# Patient Record
Sex: MALE | Race: WHITE | NOT HISPANIC OR LATINO | Employment: OTHER | URBAN - METROPOLITAN AREA
[De-identification: names, ages, dates, MRNs, and addresses within clinical notes are randomized per-mention and may not be internally consistent; named-entity substitution may affect disease eponyms.]

---

## 2020-03-08 ENCOUNTER — OFFICE VISIT (OUTPATIENT)
Dept: URGENT CARE | Facility: CLINIC | Age: 80
End: 2020-03-08
Payer: COMMERCIAL

## 2020-03-08 VITALS
TEMPERATURE: 96.5 F | OXYGEN SATURATION: 96 % | DIASTOLIC BLOOD PRESSURE: 86 MMHG | SYSTOLIC BLOOD PRESSURE: 148 MMHG | HEIGHT: 68 IN | WEIGHT: 192 LBS | BODY MASS INDEX: 29.1 KG/M2 | HEART RATE: 60 BPM | RESPIRATION RATE: 16 BRPM

## 2020-03-08 DIAGNOSIS — R13.12 OROPHARYNGEAL DYSPHAGIA: Primary | ICD-10-CM

## 2020-03-08 PROCEDURE — 99204 OFFICE O/P NEW MOD 45 MIN: CPT | Performed by: FAMILY MEDICINE

## 2020-03-08 RX ORDER — GABAPENTIN 300 MG/1
300 CAPSULE ORAL 3 TIMES DAILY
COMMUNITY
Start: 2020-01-24

## 2020-03-08 RX ORDER — LISINOPRIL 10 MG/1
15 TABLET ORAL DAILY
COMMUNITY
Start: 2020-01-22

## 2020-03-08 RX ORDER — ROSUVASTATIN CALCIUM 5 MG/1
5 TABLET, COATED ORAL DAILY
COMMUNITY
Start: 2020-01-21

## 2020-03-08 RX ORDER — MELATONIN
1000 DAILY
COMMUNITY

## 2020-03-08 NOTE — PROGRESS NOTES
Assessment/Plan:  I recommend resting the throes much as possible for now he lightly and follow-up with family physician if not a lot better in 3 days  Diagnoses and all orders for this visit:    Oropharyngeal dysphagia    Other orders  -     gabapentin (NEURONTIN) 300 mg capsule  -     rosuvastatin (CRESTOR) 5 mg tablet  -     lisinopril (ZESTRIL) 10 mg tablet  -     cholecalciferol (VITAMIN D3) 1,000 units tablet; Take 1,000 Units by mouth daily            Subjective:        Patient ID: Efrain Marr is a 78 y o  male  Patient presents with:  Difficulty Swallowing: States a peice of apple got lodge in his throat this morning, drank water and felt it go down, however since incident her feel as if it is difficult to swallow and even liquids "don't go down easy"            The following portions of the patient's history were reviewed and updated as appropriate: allergies, current medications, past family history, past medical history, past social history, past surgical history and problem list       Review of Systems   Constitutional: Negative  HENT: Positive for trouble swallowing  Eyes: Negative  Respiratory: Negative  Cardiovascular: Negative  Gastrointestinal: Negative  Endocrine: Negative  Genitourinary: Negative  Musculoskeletal: Negative  Skin: Negative  Allergic/Immunologic: Negative  Neurological: Negative  Hematological: Negative  Psychiatric/Behavioral: Negative  All other systems reviewed and are negative  Objective:             /86   Pulse 60   Temp (!) 96 5 °F (35 8 °C) (Tympanic)   Resp 16   Ht 5' 8" (1 727 m)   Wt 87 1 kg (192 lb)   SpO2 96%   BMI 29 19 kg/m²          Physical Exam   Constitutional: He is oriented to person, place, and time  He appears well-developed and well-nourished  HENT:   Head: Normocephalic and atraumatic     Right Ear: External ear normal    Left Ear: External ear normal    Nose: Nose normal  Mouth/Throat: Oropharynx is clear and moist    Eyes: Pupils are equal, round, and reactive to light  Conjunctivae and EOM are normal    Neck: Normal range of motion  Neck supple  Cardiovascular: Normal rate, regular rhythm and normal heart sounds  Pulmonary/Chest: Effort normal and breath sounds normal    Abdominal: Soft  Bowel sounds are normal    Musculoskeletal: Normal range of motion  Neurological: He is alert and oriented to person, place, and time  He has normal reflexes  Skin: Skin is warm and dry  Psychiatric: He has a normal mood and affect  His behavior is normal    Nursing note and vitals reviewed

## 2021-03-04 ENCOUNTER — HOSPITAL ENCOUNTER (INPATIENT)
Facility: HOSPITAL | Age: 81
LOS: 4 days | Discharge: HOME | DRG: 872 | End: 2021-03-08
Attending: EMERGENCY MEDICINE | Admitting: FAMILY MEDICINE
Payer: COMMERCIAL

## 2021-03-04 ENCOUNTER — APPOINTMENT (EMERGENCY)
Dept: CT IMAGING | Facility: HOSPITAL | Age: 81
DRG: 872 | End: 2021-03-04
Payer: COMMERCIAL

## 2021-03-04 DIAGNOSIS — J18.9 PNEUMONIA: ICD-10-CM

## 2021-03-04 DIAGNOSIS — K52.9 COLITIS: Primary | ICD-10-CM

## 2021-03-04 DIAGNOSIS — A04.72 C. DIFFICILE COLITIS: ICD-10-CM

## 2021-03-04 DIAGNOSIS — M89.9 LESION OF RIGHT FEMUR: ICD-10-CM

## 2021-03-04 DIAGNOSIS — N17.9 AKI (ACUTE KIDNEY INJURY) (HCC): ICD-10-CM

## 2021-03-04 PROBLEM — R19.7 DIARRHEA OF PRESUMED INFECTIOUS ORIGIN: Status: ACTIVE | Noted: 2021-03-04

## 2021-03-04 PROBLEM — I10 ESSENTIAL HYPERTENSION: Status: ACTIVE | Noted: 2021-03-04

## 2021-03-04 PROBLEM — D69.6 THROMBOCYTOPENIA (HCC): Status: ACTIVE | Noted: 2021-03-04

## 2021-03-04 PROBLEM — G89.29 CHRONIC BACK PAIN: Status: ACTIVE | Noted: 2021-03-04

## 2021-03-04 PROBLEM — R53.1 GENERALIZED WEAKNESS: Status: ACTIVE | Noted: 2021-03-04

## 2021-03-04 PROBLEM — M54.9 CHRONIC BACK PAIN: Status: ACTIVE | Noted: 2021-03-04

## 2021-03-04 PROBLEM — R91.8 OPACITY OF LUNG ON IMAGING STUDY: Status: ACTIVE | Noted: 2021-03-04

## 2021-03-04 PROBLEM — A41.9 SEPSIS WITHOUT ACUTE ORGAN DYSFUNCTION (HCC): Status: ACTIVE | Noted: 2021-03-04

## 2021-03-04 PROBLEM — N18.9 CKD (CHRONIC KIDNEY DISEASE): Status: ACTIVE | Noted: 2021-03-04

## 2021-03-04 PROBLEM — E78.00 HYPERCHOLESTEROLEMIA: Status: ACTIVE | Noted: 2021-03-04

## 2021-03-04 PROBLEM — D72.819 CHRONIC LEUKOPENIA: Status: ACTIVE | Noted: 2021-03-04

## 2021-03-04 PROBLEM — C91.10 CLL (CHRONIC LYMPHOCYTIC LEUKEMIA) (HCC): Status: ACTIVE | Noted: 2021-03-04

## 2021-03-04 LAB
ALBUMIN SERPL BCP-MCNC: 3.7 G/DL (ref 3.5–5)
ALP SERPL-CCNC: 67 U/L (ref 46–116)
ALT SERPL W P-5'-P-CCNC: 23 U/L (ref 12–78)
ANION GAP SERPL CALCULATED.3IONS-SCNC: 9 MMOL/L (ref 4–13)
AST SERPL W P-5'-P-CCNC: 21 U/L (ref 5–45)
BASOPHILS # BLD AUTO: 0.01 THOUSANDS/ΜL (ref 0–0.1)
BASOPHILS NFR BLD AUTO: 0 % (ref 0–1)
BILIRUB SERPL-MCNC: 0.64 MG/DL (ref 0.2–1)
BUN SERPL-MCNC: 27 MG/DL (ref 5–25)
CALCIUM SERPL-MCNC: 8.7 MG/DL (ref 8.3–10.1)
CHLORIDE SERPL-SCNC: 101 MMOL/L (ref 100–108)
CO2 SERPL-SCNC: 26 MMOL/L (ref 21–32)
CREAT SERPL-MCNC: 1.84 MG/DL (ref 0.6–1.3)
EOSINOPHIL # BLD AUTO: 0.09 THOUSAND/ΜL (ref 0–0.61)
EOSINOPHIL NFR BLD AUTO: 2 % (ref 0–6)
ERYTHROCYTE [DISTWIDTH] IN BLOOD BY AUTOMATED COUNT: 13.2 % (ref 11.6–15.1)
FLUAV RNA RESP QL NAA+PROBE: NEGATIVE
FLUBV RNA RESP QL NAA+PROBE: NEGATIVE
GFR SERPL CREATININE-BSD FRML MDRD: 33 ML/MIN/1.73SQ M
GLUCOSE SERPL-MCNC: 109 MG/DL (ref 65–140)
HCT VFR BLD AUTO: 40.4 % (ref 36.5–49.3)
HGB BLD-MCNC: 13.4 G/DL (ref 12–17)
IMM GRANULOCYTES # BLD AUTO: 0.03 THOUSAND/UL (ref 0–0.2)
IMM GRANULOCYTES NFR BLD AUTO: 1 % (ref 0–2)
LACTATE SERPL-SCNC: 0.8 MMOL/L (ref 0.5–2)
LIPASE SERPL-CCNC: 114 U/L (ref 73–393)
LYMPHOCYTES # BLD AUTO: 1.77 THOUSANDS/ΜL (ref 0.6–4.47)
LYMPHOCYTES NFR BLD AUTO: 30 % (ref 14–44)
MCH RBC QN AUTO: 31.8 PG (ref 26.8–34.3)
MCHC RBC AUTO-ENTMCNC: 33.2 G/DL (ref 31.4–37.4)
MCV RBC AUTO: 96 FL (ref 82–98)
MONOCYTES # BLD AUTO: 1.37 THOUSAND/ΜL (ref 0.17–1.22)
MONOCYTES NFR BLD AUTO: 23 % (ref 4–12)
NEUTROPHILS # BLD AUTO: 2.58 THOUSANDS/ΜL (ref 1.85–7.62)
NEUTS SEG NFR BLD AUTO: 44 % (ref 43–75)
NRBC BLD AUTO-RTO: 0 /100 WBCS
PLATELET # BLD AUTO: 63 THOUSANDS/UL (ref 149–390)
PMV BLD AUTO: 11.6 FL (ref 8.9–12.7)
POTASSIUM SERPL-SCNC: 4.5 MMOL/L (ref 3.5–5.3)
PROT SERPL-MCNC: 7.3 G/DL (ref 6.4–8.2)
RBC # BLD AUTO: 4.21 MILLION/UL (ref 3.88–5.62)
RSV RNA RESP QL NAA+PROBE: NEGATIVE
SARS-COV-2 RNA RESP QL NAA+PROBE: NEGATIVE
SODIUM SERPL-SCNC: 136 MMOL/L (ref 136–145)
WBC # BLD AUTO: 5.85 THOUSAND/UL (ref 4.31–10.16)

## 2021-03-04 PROCEDURE — 83690 ASSAY OF LIPASE: CPT | Performed by: EMERGENCY MEDICINE

## 2021-03-04 PROCEDURE — 96374 THER/PROPH/DIAG INJ IV PUSH: CPT

## 2021-03-04 PROCEDURE — 83605 ASSAY OF LACTIC ACID: CPT | Performed by: NURSE PRACTITIONER

## 2021-03-04 PROCEDURE — 84145 PROCALCITONIN (PCT): CPT | Performed by: NURSE PRACTITIONER

## 2021-03-04 PROCEDURE — 96375 TX/PRO/DX INJ NEW DRUG ADDON: CPT

## 2021-03-04 PROCEDURE — 99285 EMERGENCY DEPT VISIT HI MDM: CPT

## 2021-03-04 PROCEDURE — G1004 CDSM NDSC: HCPCS

## 2021-03-04 PROCEDURE — 96361 HYDRATE IV INFUSION ADD-ON: CPT

## 2021-03-04 PROCEDURE — 74176 CT ABD & PELVIS W/O CONTRAST: CPT

## 2021-03-04 PROCEDURE — 36415 COLL VENOUS BLD VENIPUNCTURE: CPT | Performed by: EMERGENCY MEDICINE

## 2021-03-04 PROCEDURE — 99223 1ST HOSP IP/OBS HIGH 75: CPT | Performed by: NURSE PRACTITIONER

## 2021-03-04 PROCEDURE — 0241U HB NFCT DS VIR RESP RNA 4 TRGT: CPT | Performed by: EMERGENCY MEDICINE

## 2021-03-04 PROCEDURE — 80053 COMPREHEN METABOLIC PANEL: CPT | Performed by: EMERGENCY MEDICINE

## 2021-03-04 PROCEDURE — 85025 COMPLETE CBC W/AUTO DIFF WBC: CPT | Performed by: EMERGENCY MEDICINE

## 2021-03-04 PROCEDURE — 87040 BLOOD CULTURE FOR BACTERIA: CPT | Performed by: EMERGENCY MEDICINE

## 2021-03-04 PROCEDURE — 99285 EMERGENCY DEPT VISIT HI MDM: CPT | Performed by: EMERGENCY MEDICINE

## 2021-03-04 RX ORDER — SODIUM CHLORIDE 9 MG/ML
100 INJECTION, SOLUTION INTRAVENOUS CONTINUOUS
Status: DISPENSED | OUTPATIENT
Start: 2021-03-04 | End: 2021-03-05

## 2021-03-04 RX ORDER — ONDANSETRON 2 MG/ML
4 INJECTION INTRAMUSCULAR; INTRAVENOUS ONCE
Status: COMPLETED | OUTPATIENT
Start: 2021-03-04 | End: 2021-03-04

## 2021-03-04 RX ORDER — SODIUM CHLORIDE 9 MG/ML
150 INJECTION, SOLUTION INTRAVENOUS CONTINUOUS
Status: DISCONTINUED | OUTPATIENT
Start: 2021-03-04 | End: 2021-03-04

## 2021-03-04 RX ORDER — HEPARIN SODIUM 5000 [USP'U]/ML
5000 INJECTION, SOLUTION INTRAVENOUS; SUBCUTANEOUS EVERY 8 HOURS SCHEDULED
Status: DISCONTINUED | OUTPATIENT
Start: 2021-03-04 | End: 2021-03-04

## 2021-03-04 RX ORDER — GABAPENTIN 100 MG/1
100 CAPSULE ORAL
Status: DISCONTINUED | OUTPATIENT
Start: 2021-03-04 | End: 2021-03-08 | Stop reason: HOSPADM

## 2021-03-04 RX ORDER — ONDANSETRON 2 MG/ML
4 INJECTION INTRAMUSCULAR; INTRAVENOUS EVERY 6 HOURS PRN
Status: DISCONTINUED | OUTPATIENT
Start: 2021-03-04 | End: 2021-03-08 | Stop reason: HOSPADM

## 2021-03-04 RX ORDER — LEVOFLOXACIN 5 MG/ML
750 INJECTION, SOLUTION INTRAVENOUS ONCE
Status: DISCONTINUED | OUTPATIENT
Start: 2021-03-05 | End: 2021-03-04

## 2021-03-04 RX ORDER — LISINOPRIL 10 MG/1
10 TABLET ORAL DAILY
Status: DISCONTINUED | OUTPATIENT
Start: 2021-03-05 | End: 2021-03-08 | Stop reason: HOSPADM

## 2021-03-04 RX ORDER — LEVOFLOXACIN 5 MG/ML
500 INJECTION, SOLUTION INTRAVENOUS
Status: DISCONTINUED | OUTPATIENT
Start: 2021-03-06 | End: 2021-03-05

## 2021-03-04 RX ORDER — ACETAMINOPHEN 325 MG/1
650 TABLET ORAL ONCE
Status: COMPLETED | OUTPATIENT
Start: 2021-03-04 | End: 2021-03-04

## 2021-03-04 RX ORDER — LEVOFLOXACIN 5 MG/ML
750 INJECTION, SOLUTION INTRAVENOUS ONCE
Status: COMPLETED | OUTPATIENT
Start: 2021-03-04 | End: 2021-03-04

## 2021-03-04 RX ADMIN — LEVOFLOXACIN 750 MG: 5 INJECTION, SOLUTION INTRAVENOUS at 21:06

## 2021-03-04 RX ADMIN — HEPARIN SODIUM 5000 UNITS: 5000 INJECTION INTRAVENOUS; SUBCUTANEOUS at 23:37

## 2021-03-04 RX ADMIN — SODIUM CHLORIDE 150 ML/HR: 0.9 INJECTION, SOLUTION INTRAVENOUS at 18:40

## 2021-03-04 RX ADMIN — ACETAMINOPHEN 650 MG: 325 TABLET ORAL at 17:33

## 2021-03-04 RX ADMIN — ONDANSETRON 4 MG: 2 INJECTION INTRAMUSCULAR; INTRAVENOUS at 17:25

## 2021-03-04 RX ADMIN — METRONIDAZOLE 500 MG: 500 INJECTION, SOLUTION INTRAVENOUS at 20:29

## 2021-03-04 RX ADMIN — SODIUM CHLORIDE 100 ML/HR: 0.9 INJECTION, SOLUTION INTRAVENOUS at 23:35

## 2021-03-04 RX ADMIN — GABAPENTIN 100 MG: 100 CAPSULE ORAL at 23:37

## 2021-03-04 RX ADMIN — SODIUM CHLORIDE 1000 ML: 0.9 INJECTION, SOLUTION INTRAVENOUS at 17:24

## 2021-03-04 NOTE — ED NOTES
Patient taken to bathroom via wheelchair and assist of one nurse       Sangita Vaz RN  03/04/21 1393

## 2021-03-04 NOTE — ED NOTES
Patient transported to [address] [place] [address], [address] [place] [address]  03/04/21 1247

## 2021-03-04 NOTE — ED PROVIDER NOTES
History  Chief Complaint   Patient presents with    Diarrhea     pt states he has been having diarrhea for the past week  c/o increase weakness and had an episode today of walking down steps and legs felt too weak so he collapsed on his butt  denies hitting head, no loc  recently tested for covid and was negative     Patient is an 15-year-old male brought to the emergency department by EMS secondary to diarrhea x1 week with generalized weakness, patient reports some nausea at times but no vomiting, no fever or chills, he does report crampy abdominal pain but cannot pinpoint any specific area, he has noted small amounts of bright red blood in his stool at times which he contributes to his history of hemorrhoids, he denies any sick contacts, today he was walking up some stairs to go to the bathroom in his legs became very weak prompting him to sit down at which time his wife called EMS for assistance, he did not have any loss of consciousness and denies any injury          Prior to Admission Medications   Prescriptions Last Dose Informant Patient Reported? Taking? cholecalciferol (VITAMIN D3) 1,000 units tablet 3/4/2021 at Unknown time  Yes Yes   Sig: Take 1,000 Units by mouth daily   gabapentin (NEURONTIN) 300 mg capsule 3/4/2021 at Unknown time  Yes Yes   lisinopril (ZESTRIL) 10 mg tablet 3/4/2021 at Unknown time  Yes Yes   rosuvastatin (CRESTOR) 5 mg tablet 3/4/2021 at Unknown time  Yes Yes      Facility-Administered Medications: None       Past Medical History:   Diagnosis Date    Hypertension     Prostate cancer [facility])        Past Surgical History:   Procedure Laterality Date    APPENDECTOMY      BACK SURGERY  2016    CATARACT EXTRACTION  2020    PROSTATE SURGERY         History reviewed  No pertinent family history  I have reviewed and agree with the history as documented      E-Cigarette/Vaping    E-Cigarette Use Never User      E-Cigarette/Vaping Substances     Social History     Tobacco Use    Smoking status: Former Smoker    Smokeless tobacco: Never Used    Tobacco comment: Quit at age 23   Substance Use Topics    Alcohol use: Not Currently    Drug use: Not Currently       Review of Systems   Constitutional: Positive for fatigue  HENT: Negative  Eyes: Negative  Respiratory: Negative  Cardiovascular: Negative  Gastrointestinal: Positive for diarrhea and nausea  Endocrine: Negative  Genitourinary: Negative  Musculoskeletal: Negative  Skin: Negative  Allergic/Immunologic: Negative  Neurological: Positive for weakness  Hematological: Negative  Psychiatric/Behavioral: Negative  Physical Exam  Physical Exam  Constitutional:       Appearance: He is well-developed  He is ill-appearing  HENT:      Head: Normocephalic and atraumatic  Eyes:      Conjunctiva/sclera: Conjunctivae normal       Pupils: Pupils are equal, round, and reactive to light  Neck:      Musculoskeletal: Normal range of motion and neck supple  Cardiovascular:      Rate and Rhythm: Normal rate  Pulmonary:      Effort: Pulmonary effort is normal    Abdominal:      Palpations: Abdomen is soft  Musculoskeletal: Normal range of motion  Skin:     General: Skin is warm and dry  Neurological:      Mental Status: He is alert and oriented to person, place, and time           Vital Signs  ED Triage Vitals   Temperature Pulse Respirations Blood Pressure SpO2   03/04/21 1718 03/04/21 1718 03/04/21 1718 03/04/21 1718 03/04/21 1718   (!) 101 7 °F (38 7 °C) 99 20 153/85 93 %      Temp Source Heart Rate Source Patient Position - Orthostatic VS BP Location FiO2 (%)   03/04/21 1718 03/04/21 1718 03/04/21 2015 03/04/21 1718 --   Temporal Monitor Sitting Left arm       Pain Score       03/04/21 1733       Med Not Given for Pain - for MAR use only           Vitals:    03/04/21 1718 03/04/21 1915 03/04/21 2015 03/04/21 2055   BP: 153/85 145/66 126/58 139/78   Pulse: 99 102 101 100   Patient Position - Orthostatic VS:   Sitting Sitting             ED Medications  Medications   sodium chloride 0 9 % infusion (150 mL/hr Intravenous New Bag 3/4/21 1840)   metroNIDAZOLE (FLAGYL) IVPB (premix) 500 mg 100 mL (500 mg Intravenous New Bag 3/4/21 2029)   levofloxacin (LEVAQUIN) IVPB (premix in dextrose) 750 mg 150 mL (has no administration in time range)   sodium chloride 0 9 % bolus 1,000 mL (0 mL Intravenous Stopped 3/4/21 1833)   ondansetron (ZOFRAN) injection 4 mg (4 mg Intravenous Given 3/4/21 1725)   acetaminophen (TYLENOL) tablet 650 mg (650 mg Oral Given 3/4/21 1733)       Diagnostic Studies  Results Reviewed     Procedure Component Value Units Date/Time    Lactic acid, plasma [[id number]]     Lab Status: No result Specimen: Blood     COVID19, Influenza A/B, RSV PCR, [facility] [[id number]]  (Normal) Collected: 03/04/21 1723    Lab Status: Final result Specimen: Nares from Nasopharyngeal Swab Updated: 03/04/21 1818     SARS-CoV-2 Negative     INFLUENZA A PCR Negative     INFLUENZA B PCR Negative     RSV PCR Negative    Narrative: This test has been authorized by FDA under an EUA (Emergency Use Assay) for use by authorized laboratories  Clinical caution and judgement should be used with the interpretation of these results with consideration of the clinical impression and other laboratory testing  Testing reported as "Positive" or "Negative" has been proven to be accurate according to standard laboratory validation requirements  All testing is performed with control materials showing appropriate reactivity at standard intervals      CBC and differential [[id number]]  (Abnormal) Collected: 03/04/21 1724    Lab Status: Final result Specimen: Blood from Arm, Left Updated: 03/04/21 1759     WBC 5 85 Thousand/uL      RBC 4 21 Million/uL      Hemoglobin 13 4 g/dL      Hematocrit 40 4 %      MCV 96 fL      MCH 31 8 pg      MCHC 33 2 g/dL      RDW 13 2 %      MPV 11 6 fL      Platelets 63 Thousands/uL      nRBC 0 /100 WBCs Neutrophils Relative 44 %      Immat GRANS % 1 %      Lymphocytes Relative 30 %      Monocytes Relative 23 %      Eosinophils Relative 2 %      Basophils Relative 0 %      Neutrophils Absolute 2 58 Thousands/µL      Immature Grans Absolute 0 03 Thousand/uL      Lymphocytes Absolute 1 77 Thousands/µL      Monocytes Absolute 1 37 Thousand/µL      Eosinophils Absolute 0 09 Thousand/µL      Basophils Absolute 0 01 Thousands/µL     Comprehensive metabolic panel [[id number]]  (Abnormal) Collected: 03/04/21 1724    Lab Status: Final result Specimen: Blood from Arm, Left Updated: 03/04/21 1751     Sodium 136 mmol/L      Potassium 4 5 mmol/L      Chloride 101 mmol/L      CO2 26 mmol/L      ANION GAP 9 mmol/L      BUN 27 mg/dL      Creatinine 1 84 mg/dL      Glucose 109 mg/dL      Calcium 8 7 mg/dL      AST 21 U/L      ALT 23 U/L      Alkaline Phosphatase 67 U/L      Total Protein 7 3 g/dL      Albumin 3 7 g/dL      Total Bilirubin 0 64 mg/dL      eGFR 33 ml/min/1 73sq m     Narrative:      [place] guidelines for Chronic Kidney Disease (CKD):     Stage 1 with normal or high GFR (GFR > 90 mL/min/1 73 square meters)    Stage 2 Mild CKD (GFR = 60-89 mL/min/1 73 square meters)    Stage 3A Moderate CKD (GFR = 45-59 mL/min/1 73 square meters)    Stage 3B Moderate CKD (GFR = 30-44 mL/min/1 73 square meters)    Stage 4 Severe CKD (GFR = 15-29 mL/min/1 73 square meters)    Stage 5 End Stage CKD (GFR <15 mL/min/1 73 square meters)  Note: GFR calculation is accurate only with a steady state creatinine    Lipase [[id number]]  (Normal) Collected: 03/04/21 1724    Lab Status: Final result Specimen: Blood from Arm, Left Updated: 03/04/21 1751     Lipase 114 u/L     Blood culture #2 [[id number]] Collected: 03/04/21 1724    Lab Status: In process Specimen: Blood from Arm, Left Updated: 03/04/21 1733    Blood culture #1 [[id number]] Collected: 03/04/21 1730    Lab Status:  In process Specimen: Blood from Hand, Right Updated: 03/04/21 1733    UA w Reflex to Microscopic w Reflex to Culture [[id number]]     Lab Status: No result Specimen: Urine                  CT abdomen pelvis wo contrast   Final Result by Julian Henry MD (03/04 1955)      1  Colitis involving the descending and sigmoid colon   2  Bibasal opacities with airspace opacities in the right lower lobe, concerning for pneumonia  Recommend follow-up chest CT in 2 months to ensure resolution   3  Osseous lesion in the right femur,  imaging characteristics suggest a low-grade chondroid lesion, however metastasis is also a differential consideration  If prior CT or MRI  become available, this can be up loaded, and an addendum to this report can    be issued upon request   Otherwise suggest follow-up bone scan for further evaluation      The study was marked in [facility] for immediate notification              Workstation performed: [id number]                           ED Course  ED Course as of Mar 04 2058   Thu Mar 04, 2021   2058 Patient with stable vital signs, evidence of KATELIN with creatinine of 1 84, CT scan consistent with colitis as well as pneumonia, started on IV antibiotics and discussed with hospitalist for admission, plan was discussed with patient at bedside who is in agreement as well                                                Disposition  Final diagnoses:   Colitis   Pneumonia   KATELIN (acute kidney injury) ([facility] [address] )     Time reflects when diagnosis was documented in both MDM as applicable and the Disposition within this note     Time User Action Codes Description Comment    3/4/2021  8:39 PM Scott Jean Add [K52 9] Colitis     3/4/2021  8:39 PM Von Langford Add [J18 9] Pneumonia     3/4/2021  8:39 PM Von Langford Add [N17 9] KATELIN (acute kidney injury) [facility])       ED Disposition     ED Disposition Condition Date/Time Comment    Admit Stable Thu Mar 4, 2021  8:39 PM Case was discussed with STEPHANIE Pedroza and the patient's admission status was agreed to be Admission Status: inpatient status to the service of Dr Gilma Palma          Follow-up Information    None         Patient's Medications   Discharge Prescriptions    No medications on file     No discharge procedures on file      PDMP Review     None          ED Provider  Electronically Signed by           Lauri Ferrer DO  03/04/21 8722

## 2021-03-05 ENCOUNTER — APPOINTMENT (INPATIENT)
Dept: CT IMAGING | Facility: HOSPITAL | Age: 81
DRG: 872 | End: 2021-03-05
Payer: COMMERCIAL

## 2021-03-05 LAB
ANION GAP SERPL CALCULATED.3IONS-SCNC: 8 MMOL/L (ref 4–13)
BACTERIA UR QL AUTO: NORMAL /HPF
BASOPHILS # BLD AUTO: 0.01 THOUSANDS/ΜL (ref 0–0.1)
BASOPHILS NFR BLD AUTO: 0 % (ref 0–1)
BILIRUB UR QL STRIP: NEGATIVE
BUN SERPL-MCNC: 24 MG/DL (ref 5–25)
CALCIUM SERPL-MCNC: 8.1 MG/DL (ref 8.3–10.1)
CAMPYLOBACTER DNA SPEC NAA+PROBE: NORMAL
CHLORIDE SERPL-SCNC: 104 MMOL/L (ref 100–108)
CLARITY UR: CLEAR
CO2 SERPL-SCNC: 26 MMOL/L (ref 21–32)
COLOR UR: YELLOW
CREAT SERPL-MCNC: 1.7 MG/DL (ref 0.6–1.3)
EOSINOPHIL # BLD AUTO: 0 THOUSAND/ΜL (ref 0–0.61)
EOSINOPHIL NFR BLD AUTO: 0 % (ref 0–6)
ERYTHROCYTE [DISTWIDTH] IN BLOOD BY AUTOMATED COUNT: 13.4 % (ref 11.6–15.1)
GFR SERPL CREATININE-BSD FRML MDRD: 36 ML/MIN/1.73SQ M
GLUCOSE SERPL-MCNC: 108 MG/DL (ref 65–140)
GLUCOSE UR STRIP-MCNC: NEGATIVE MG/DL
HCT VFR BLD AUTO: 37.2 % (ref 36.5–49.3)
HEMOCCULT STL QL: POSITIVE
HGB BLD-MCNC: 12.2 G/DL (ref 12–17)
HGB UR QL STRIP.AUTO: ABNORMAL
IMM GRANULOCYTES # BLD AUTO: 0.04 THOUSAND/UL (ref 0–0.2)
IMM GRANULOCYTES NFR BLD AUTO: 1 % (ref 0–2)
KETONES UR STRIP-MCNC: ABNORMAL MG/DL
LEUKOCYTE ESTERASE UR QL STRIP: NEGATIVE
LYMPHOCYTES # BLD AUTO: 1.51 THOUSANDS/ΜL (ref 0.6–4.47)
LYMPHOCYTES NFR BLD AUTO: 21 % (ref 14–44)
MCH RBC QN AUTO: 31.5 PG (ref 26.8–34.3)
MCHC RBC AUTO-ENTMCNC: 32.8 G/DL (ref 31.4–37.4)
MCV RBC AUTO: 96 FL (ref 82–98)
MONOCYTES # BLD AUTO: 1.96 THOUSAND/ΜL (ref 0.17–1.22)
MONOCYTES NFR BLD AUTO: 27 % (ref 4–12)
NEUTROPHILS # BLD AUTO: 3.66 THOUSANDS/ΜL (ref 1.85–7.62)
NEUTS SEG NFR BLD AUTO: 51 % (ref 43–75)
NITRITE UR QL STRIP: NEGATIVE
NON-SQ EPI CELLS URNS QL MICRO: NORMAL /HPF
NRBC BLD AUTO-RTO: 0 /100 WBCS
PH UR STRIP.AUTO: 5 [PH]
PLATELET # BLD AUTO: 56 THOUSANDS/UL (ref 149–390)
PMV BLD AUTO: 11.8 FL (ref 8.9–12.7)
POTASSIUM SERPL-SCNC: 4.2 MMOL/L (ref 3.5–5.3)
PROCALCITONIN SERPL-MCNC: 0.39 NG/ML
PROT UR STRIP-MCNC: NEGATIVE MG/DL
RBC # BLD AUTO: 3.87 MILLION/UL (ref 3.88–5.62)
RBC #/AREA URNS AUTO: NORMAL /HPF
SALMONELLA DNA SPEC QL NAA+PROBE: NORMAL
SHIGA TOXIN STX GENE SPEC NAA+PROBE: NORMAL
SHIGELLA DNA SPEC QL NAA+PROBE: NORMAL
SODIUM SERPL-SCNC: 138 MMOL/L (ref 136–145)
SP GR UR STRIP.AUTO: 1.02 (ref 1–1.03)
UROBILINOGEN UR QL STRIP.AUTO: 0.2 E.U./DL
WBC # BLD AUTO: 7.18 THOUSAND/UL (ref 4.31–10.16)
WBC #/AREA URNS AUTO: NORMAL /HPF

## 2021-03-05 PROCEDURE — 87505 NFCT AGENT DETECTION GI: CPT | Performed by: NURSE PRACTITIONER

## 2021-03-05 PROCEDURE — 87493 C DIFF AMPLIFIED PROBE: CPT | Performed by: FAMILY MEDICINE

## 2021-03-05 PROCEDURE — 80048 BASIC METABOLIC PNL TOTAL CA: CPT | Performed by: NURSE PRACTITIONER

## 2021-03-05 PROCEDURE — 87505 NFCT AGENT DETECTION GI: CPT | Performed by: FAMILY MEDICINE

## 2021-03-05 PROCEDURE — 99233 SBSQ HOSP IP/OBS HIGH 50: CPT | Performed by: FAMILY MEDICINE

## 2021-03-05 PROCEDURE — 73700 CT LOWER EXTREMITY W/O DYE: CPT

## 2021-03-05 PROCEDURE — 85025 COMPLETE CBC W/AUTO DIFF WBC: CPT | Performed by: NURSE PRACTITIONER

## 2021-03-05 PROCEDURE — 82272 OCCULT BLD FECES 1-3 TESTS: CPT | Performed by: NURSE PRACTITIONER

## 2021-03-05 PROCEDURE — 81001 URINALYSIS AUTO W/SCOPE: CPT | Performed by: EMERGENCY MEDICINE

## 2021-03-05 RX ADMIN — GABAPENTIN 100 MG: 100 CAPSULE ORAL at 21:20

## 2021-03-05 RX ADMIN — METRONIDAZOLE 500 MG: 500 INJECTION, SOLUTION INTRAVENOUS at 04:39

## 2021-03-05 RX ADMIN — LISINOPRIL 10 MG: 10 TABLET ORAL at 08:24

## 2021-03-05 NOTE — ASSESSMENT & PLAN NOTE
· Diarrhea x1 week with fever 101 7  · CT abdomen pelvis:  Colitis involving the descending and sigmoid colon  · NPO after midnight  · GI consult  · Check stool panel  · Hemoccult stool  · Stool record at bedside  · Continue Flagyl and Levaquin-renally dosed

## 2021-03-05 NOTE — ASSESSMENT & PLAN NOTE
· Last lipid panel in March of 2019 unremarkable  · Continue Crestor  · Continue outpatient follow-up

## 2021-03-05 NOTE — ASSESSMENT & PLAN NOTE
· Osseous lesion in the right femur, imaging characteristics suggest a low-grade chondroid lesion, however metastasis is also differential consideration  · If prior or CT or MRI becomes available, these can be uploaded, and an addendum this report can be issued upon request   Unable to find these imaging studies in available records  · Follow-up bone scan for further evaluation

## 2021-03-05 NOTE — ASSESSMENT & PLAN NOTE
Lab Results   Component Value Date    EGFR 36 03/05/2021    EGFR 33 03/04/2021    CREATININE 1 70 (H) 03/05/2021    CREATININE 1 84 (H) 03/04/2021     · Baseline 1 5  · Daily metabolic panel and trend creatinine  · Caution with nephrotoxins avoid hypotension

## 2021-03-05 NOTE — PLAN OF CARE
Problem: Potential for Falls  Goal: Patient will remain free of falls  Description: INTERVENTIONS:  - Assess patient frequently for physical needs  -  Identify cognitive and physical deficits and behaviors that affect risk of falls  -  [place] fall precautions as indicated by assessment   - Educate patient/family on patient safety including physical limitations  - Instruct patient to call for assistance with activity based on assessment  - Modify environment to reduce risk of injury  - Consider OT/PT consult to assist with strengthening/mobility  3/5/2021 0025 by Trish Joseph RN  Outcome: Progressing  3/5/2021 0025 by Trish Joseph RN  Outcome: Progressing     Problem: Nutrition/Hydration-ADULT  Goal: Nutrient/Hydration intake appropriate for improving, restoring or maintaining nutritional needs  Description: Monitor and assess patient's nutrition/hydration status for malnutrition  Collaborate with interdisciplinary team and initiate plan and interventions as ordered  Monitor patient's weight and dietary intake as ordered or per policy  Utilize nutrition screening tool and intervene as necessary  Determine patient's food preferences and provide high-protein, high-caloric foods as appropriate       INTERVENTIONS:  - Monitor oral intake, urinary output, labs, and treatment plans  - Assess nutrition and hydration status and recommend course of action  - Evaluate amount of meals eaten  - Assist patient with eating if necessary   - Allow adequate time for meals  - Recommend/ encourage appropriate diets, oral nutritional supplements, and vitamin/mineral supplements  - Order, calculate, and assess calorie counts as needed  - Recommend, monitor, and adjust tube feedings and TPN/PPN based on assessed needs  - Assess need for intravenous fluids  - Provide specific nutrition/hydration education as appropriate  - Include patient/family/caregiver in decisions related to nutrition  3/5/2021 0025 by Trish Joseph RN  Outcome: Progressing  3/5/2021 0025 by Chitra De Guzman RN  Outcome: Progressing     Problem: PAIN - ADULT  Goal: Verbalizes/displays adequate comfort level or baseline comfort level  Description: Interventions:  - Encourage patient to monitor pain and request assistance  - Assess pain using appropriate pain scale  - Administer analgesics based on type and severity of pain and evaluate response  - Implement non-pharmacological measures as appropriate and evaluate response  - Consider cultural and social influences on pain and pain management  - Notify physician/advanced practitioner if interventions unsuccessful or patient reports new pain  3/5/2021 0025 by Chitra De Guzman RN  Outcome: Progressing  3/5/2021 0025 by hCitra De Guzman RN  Outcome: Progressing     Problem: INFECTION - ADULT  Goal: Absence or prevention of progression during hospitalization  Description: INTERVENTIONS:  - Assess and monitor for signs and symptoms of infection  - Monitor lab/diagnostic results  - Monitor all insertion sites, i e  indwelling lines, tubes, and drains  - Monitor endotracheal if appropriate and nasal secretions for changes in amount and color  - [place] appropriate cooling/warming therapies per order  - Administer medications as ordered  - Instruct and encourage patient and family to use good hand hygiene technique  - Identify and instruct in appropriate isolation precautions for identified infection/condition  3/5/2021 0025 by Chitra De Guzman RN  Outcome: Progressing  3/5/2021 0025 by Chitra De Guzman RN  Outcome: Progressing  Goal: Absence of fever/infection during neutropenic period  Description: INTERVENTIONS:  - Monitor WBC    3/5/2021 0025 by Chitra De Guzman RN  Outcome: Progressing  3/5/2021 0025 by Chitra De Guzman RN  Outcome: Progressing     Problem: SAFETY ADULT  Goal: Patient will remain free of falls  Description: INTERVENTIONS:  - Assess patient frequently for physical needs  -  Identify cognitive and physical deficits and behaviors that affect risk of falls    -  [place] fall precautions as indicated by assessment   - Educate patient/family on patient safety including physical limitations  - Instruct patient to call for assistance with activity based on assessment  - Modify environment to reduce risk of injury  - Consider OT/PT consult to assist with strengthening/mobility  3/5/2021 0025 by Matt Mejía RN  Outcome: Progressing  3/5/2021 0025 by Matt Mejía RN  Outcome: Progressing  Goal: Maintain or return to baseline ADL function  Description: INTERVENTIONS:  -  Assess patient's ability to carry out ADLs; assess patient's baseline for ADL function and identify physical deficits which impact ability to perform ADLs (bathing, care of mouth/teeth, toileting, grooming, dressing, etc )  - Assess/evaluate cause of self-care deficits   - Assess range of motion  - Assess patient's mobility; develop plan if impaired  - Assess patient's need for assistive devices and provide as appropriate  - Encourage maximum independence but intervene and supervise when necessary  - Involve family in performance of ADLs  - Assess for home care needs following discharge   - Consider OT consult to assist with ADL evaluation and planning for discharge  - Provide patient education as appropriate  3/5/2021 0025 by Matt Mejía RN  Outcome: Progressing  3/5/2021 0025 by Matt Mejía RN  Outcome: Progressing  Goal: Maintain or return mobility status to optimal level  Description: INTERVENTIONS:  - Assess patient's baseline mobility status (ambulation, transfers, stairs, etc )    - Identify cognitive and physical deficits and behaviors that affect mobility  - Identify mobility aids required to assist with transfers and/or ambulation (gait belt, sit-to-stand, lift, walker, cane, etc )  - [place] fall precautions as indicated by assessment  - Record patient progress and toleration of activity level on Mobility SBAR; progress patient to next Phase/Stage  - Instruct patient to call for assistance with activity based on assessment  - Consider rehabilitation consult to assist with strengthening/weightbearing, etc   3/5/2021 0025 by Gilmar Sarkar RN  Outcome: Progressing  3/5/2021 0025 by Gilmar Sarkar RN  Outcome: Progressing     Problem: DISCHARGE PLANNING  Goal: Discharge to home or other facility with appropriate resources  Description: INTERVENTIONS:  - Identify barriers to discharge w/patient and caregiver  - Arrange for needed discharge resources and transportation as appropriate  - Identify discharge learning needs (meds, wound care, etc )  - Arrange for interpretive services to assist at discharge as needed  - Refer to Case Management Department for coordinating discharge planning if the patient needs post-hospital services based on physician/advanced practitioner order or complex needs related to functional status, cognitive ability, or social support system  3/5/2021 0025 by Gilmar Sarkar RN  Outcome: Progressing  3/5/2021 0025 by Gilmar Sarkar RN  Outcome: Progressing     Problem: Knowledge Deficit  Goal: Patient/family/caregiver demonstrates understanding of disease process, treatment plan, medications, and discharge instructions  Description: Complete learning assessment and assess knowledge base    Interventions:  - Provide teaching at level of understanding  - Provide teaching via preferred learning methods  3/5/2021 0025 by Gilmar Sarkar RN  Outcome: Progressing  3/5/2021 0025 by Gilmar Sarkar RN  Outcome: Progressing

## 2021-03-05 NOTE — CONSULTS
Consultation - GI   Mely Loving 78 y o  male MRN: [id number]  Unit/Bed#: -01 Encounter: [id number]      Assessment/Plan     Assessment:    77 y/o man with bloody diarrhea x 4 days leading to weakness and dehydration  CT showing rectal-sparing left sided colitis  Stool studies pending  Suspect resolving ischemic colitis    Plan:    1  Rule out C diff - this should have been done prior to starting antibiotics as metronidazole may result in false negative C diff even though it is no longer recommended for treatment of C diff  2   Would hold antibiotics for now until stool studies are completed    3  Continue conservative management - recommended NPO for another 24 hrs but patient insisting on clears  Given overall stability we can cautiously start clears and observe for worsening pain, fever, hemodynamic instability  4   Colonoscopy as outpt (he never had one) in 1-2 months    5  He was advised by his PCP to start baby ASA - would do that upon discharge      History of Present Illness   Physician Requesting Consult: Cori Mcneal MD  Reason for Consult / Principal Problem:  diarrhea  Hx and PE limited by:   HPI: Mely Loving is a 78y o  year old male who presents with 4 day history of bloody diarrhea  Reports 3-4 loose watery and bloody BM since 3/1/21  Initially not a/w abd pain  Denies any fevers or chills during the last 4 days  Admitted to the hospital as he became weak and fell  No LOC  CT in the ER c/w left sided colitis involving the sigmoid and descending colon  Had fever of 101 7 in the ER and was started on levoquin and metronidazole  Stool was sent for GI pathogens and it is pending  He c/o hunger now and reports 3 episodes of bloody diarrhea overnight  No recent abx use  No recent travel or exposure to someone with diarrheal illness  Lives with wife at home and has not traveled anywhere recently    Denies any consumption of unusual foods to include uncooked meat   No chronic diarrheal illness - no history of IBD  No colonoscopy to date  Physical exam c/w slight tachycardia in the 100s, normoactive bowel sounds   + LLQ pain but no rebound tenderness    Consults    Review of Systems    Historical Information   Past Medical History:   Diagnosis Date    Hypertension     Prostate cancer ([address] )      Past Surgical History:   Procedure Laterality Date    APPENDECTOMY      BACK SURGERY  2016    CATARACT EXTRACTION  2020    PROSTATE SURGERY       Social History   Social History     Substance and Sexual Activity   Alcohol Use Not Currently     Social History     Substance and Sexual Activity   Drug Use Not Currently     E-Cigarette/Vaping    E-Cigarette Use Never User      E-Cigarette/Vaping Substances     Social History     Tobacco Use   Smoking Status Former Smoker   Smokeless Tobacco Never Used   Tobacco Comment    Quit at age 23     Family History: non-contributory    Meds/Allergies   all current active meds have been reviewed    No Known Allergies    Objective       Intake/Output Summary (Last 24 hours) at 3/5/2021 0945  Last data filed at 3/4/2021 2104  Gross per 24 hour   Intake 1100 ml   Output --   Net 1100 ml       Invasive Devices:   Peripheral IV 03/04/21 Left;Ventral (anterior) Arm (Active)   Site Assessment WDL 03/05/21 0800   Dressing Type Transparent 03/05/21 0800   Line Status Flushed;Blood return noted; Infusing 03/05/21 0800   Dressing Status Clean;Dry; Intact 03/05/21 0800   Dressing Change Due 03/08/21 03/05/21 0800   Reason Not Rotated Not due 03/05/21 0800       Physical Exam    Lab Results: I have personally reviewed pertinent reports  Imaging Studies: I have personally reviewed pertinent reports  EKG, Pathology, and Other Studies: I have personally reviewed pertinent reports  VTE Prophylaxis:     Counseling / Coordination of Care  Total floor / unit time spent today 45 minutes   Greater than 50% of total time was spent with the patient and / or family counseling and / or coordination of care   A description of the counseling / coordination of care:

## 2021-03-05 NOTE — PLAN OF CARE
Problem: Potential for Falls  Goal: Patient will remain free of falls  Description: INTERVENTIONS:  - Assess patient frequently for physical needs  -  Identify cognitive and physical deficits and behaviors that affect risk of falls  -  [place] fall precautions as indicated by assessment   - Educate patient/family on patient safety including physical limitations  - Instruct patient to call for assistance with activity based on assessment  - Modify environment to reduce risk of injury  - Consider OT/PT consult to assist with strengthening/mobility  Outcome: Progressing     Problem: Nutrition/Hydration-ADULT  Goal: Nutrient/Hydration intake appropriate for improving, restoring or maintaining nutritional needs  Description: Monitor and assess patient's nutrition/hydration status for malnutrition  Collaborate with interdisciplinary team and initiate plan and interventions as ordered  Monitor patient's weight and dietary intake as ordered or per policy  Utilize nutrition screening tool and intervene as necessary  Determine patient's food preferences and provide high-protein, high-caloric foods as appropriate       INTERVENTIONS:  - Monitor oral intake, urinary output, labs, and treatment plans  - Assess nutrition and hydration status and recommend course of action  - Evaluate amount of meals eaten  - Assist patient with eating if necessary   - Allow adequate time for meals  - Recommend/ encourage appropriate diets, oral nutritional supplements, and vitamin/mineral supplements  - Order, calculate, and assess calorie counts as needed  - Recommend, monitor, and adjust tube feedings and TPN/PPN based on assessed needs  - Assess need for intravenous fluids  - Provide specific nutrition/hydration education as appropriate  - Include patient/family/caregiver in decisions related to nutrition  Outcome: Progressing     Problem: PAIN - ADULT  Goal: Verbalizes/displays adequate comfort level or baseline comfort level  Description: Interventions:  - Encourage patient to monitor pain and request assistance  - Assess pain using appropriate pain scale  - Administer analgesics based on type and severity of pain and evaluate response  - Implement non-pharmacological measures as appropriate and evaluate response  - Consider cultural and social influences on pain and pain management  - Notify physician/advanced practitioner if interventions unsuccessful or patient reports new pain  Outcome: Progressing     Problem: INFECTION - ADULT  Goal: Absence or prevention of progression during hospitalization  Description: INTERVENTIONS:  - Assess and monitor for signs and symptoms of infection  - Monitor lab/diagnostic results  - Monitor all insertion sites, i e  indwelling lines, tubes, and drains  - Monitor endotracheal if appropriate and nasal secretions for changes in amount and color  - [place] appropriate cooling/warming therapies per order  - Administer medications as ordered  - Instruct and encourage patient and family to use good hand hygiene technique  - Identify and instruct in appropriate isolation precautions for identified infection/condition  Outcome: Progressing  Goal: Absence of fever/infection during neutropenic period  Description: INTERVENTIONS:  - Monitor WBC    Outcome: Progressing     Problem: SAFETY ADULT  Goal: Patient will remain free of falls  Description: INTERVENTIONS:  - Assess patient frequently for physical needs  -  Identify cognitive and physical deficits and behaviors that affect risk of falls    -  [place] fall precautions as indicated by assessment   - Educate patient/family on patient safety including physical limitations  - Instruct patient to call for assistance with activity based on assessment  - Modify environment to reduce risk of injury  - Consider OT/PT consult to assist with strengthening/mobility  Outcome: Progressing  Goal: Maintain or return to baseline ADL function  Description: INTERVENTIONS:  -  Assess patient's ability to carry out ADLs; assess patient's baseline for ADL function and identify physical deficits which impact ability to perform ADLs (bathing, care of mouth/teeth, toileting, grooming, dressing, etc )  - Assess/evaluate cause of self-care deficits   - Assess range of motion  - Assess patient's mobility; develop plan if impaired  - Assess patient's need for assistive devices and provide as appropriate  - Encourage maximum independence but intervene and supervise when necessary  - Involve family in performance of ADLs  - Assess for home care needs following discharge   - Consider OT consult to assist with ADL evaluation and planning for discharge  - Provide patient education as appropriate  Outcome: Progressing  Goal: Maintain or return mobility status to optimal level  Description: INTERVENTIONS:  - Assess patient's baseline mobility status (ambulation, transfers, stairs, etc )    - Identify cognitive and physical deficits and behaviors that affect mobility  - Identify mobility aids required to assist with transfers and/or ambulation (gait belt, sit-to-stand, lift, walker, cane, etc )  - [place] fall precautions as indicated by assessment  - Record patient progress and toleration of activity level on Mobility SBAR; progress patient to next Phase/Stage  - Instruct patient to call for assistance with activity based on assessment  - Consider rehabilitation consult to assist with strengthening/weightbearing, etc   Outcome: Progressing     Problem: DISCHARGE PLANNING  Goal: Discharge to home or other facility with appropriate resources  Description: INTERVENTIONS:  - Identify barriers to discharge w/patient and caregiver  - Arrange for needed discharge resources and transportation as appropriate  - Identify discharge learning needs (meds, wound care, etc )  - Arrange for interpretive services to assist at discharge as needed  - Refer to Case Management Department for coordinating discharge planning if the patient needs post-hospital services based on physician/advanced practitioner order or complex needs related to functional status, cognitive ability, or social support system  Outcome: Progressing     Problem: Knowledge Deficit  Goal: Patient/family/caregiver demonstrates understanding of disease process, treatment plan, medications, and discharge instructions  Description: Complete learning assessment and assess knowledge base    Interventions:  - Provide teaching at level of understanding  - Provide teaching via preferred learning methods  Outcome: Progressing

## 2021-03-05 NOTE — UTILIZATION REVIEW
Initial Clinical Review    Admission: Date/Time/Statement:   Admission Orders (From admission, onward)     Ordered        03/04/21 2039  Inpatient Admission  Once                   Orders Placed This Encounter   Procedures    Inpatient Admission     Standing Status:   Standing     Number of Occurrences:   1     Order Specific Question:   Level of Care     Answer:   Med Surg [16]     Order Specific Question:   Estimated length of stay     Answer:   More than 2 Midnights     Order Specific Question:   Certification     Answer:   I certify that inpatient services are medically necessary for this patient for a duration of greater than two midnights  See H&P and MD Progress Notes for additional information about the patient's course of treatment  ED Arrival Information     Expected Arrival Acuity Means of Arrival Escorted By Service Admission Type    3/4/2021 16:57 3/4/2021 17:17 Urgent Ambulance [facility] Hospitalist Urgent    Arrival Complaint    diarrhea        Chief Complaint   Patient presents with    Diarrhea     pt states he has been having diarrhea for the past week  c/o increase weakness and had an episode today of walking down steps and legs felt too weak so he collapsed on his butt  denies hitting head, no loc  recently tested for covid and was negative     Assessment/Plan: 78year old male to the ED from home via EMS with complaints of diarrhea for a week prior to arrival, now he is so weak, it is hard for him to ambulate  Admitted to inpatient for  Sepsis, colitis, opacity right lower lung, lesion right femur  He arrives appearing ill with fever, weakness  CT a/P shows: Colitis involving the descending and sigmoid colon  Started on IV fluids, IV abx  Check stool panel and hemoccult stool  GI consult  H/O CLL, chronic leukopenia  COVID neg  Bibasilar opacities seen on CT scan:  Pulse ox 93 % on room air  Continue with IV abx  Check Procal  Platelets 63  Check CBC daily and trend platelets  Monitor for bleeding  He reports blood upon wiping after BM and has hemorrhoids  3/5 GI consult: Hold on further abx until stool studies are done  COntinue with conservative management  KEep NPO, but insisting on clears  Cautiously start clears and observe  Mild tachycardia  NOrmoactive BS  +LLQ pain  3/5 UPDate: Sepsis without acute organ dysfunction  HOld abx for now  Continue with mild abdominal pain  ED Triage Vitals   Temperature Pulse Respirations Blood Pressure SpO2   03/04/21 1718 03/04/21 1718 03/04/21 1718 03/04/21 1718 03/04/21 1718   (!) 101 7 °F (38 7 °C) 99 20 153/85 93 %      Temp Source Heart Rate Source Patient Position - Orthostatic VS BP Location FiO2 (%)   03/04/21 1718 03/04/21 1718 03/04/21 2015 03/04/21 1718 --   Temporal Monitor Sitting Left arm       Pain Score       03/04/21 1733       Med Not Given for Pain - for MAR use only          Wt Readings from Last 1 Encounters:   03/04/21 85 6 kg (188 lb 11 4 oz)     Additional Vital Signs:   Date/Time  Temp  Pulse  Resp  BP  MAP (mmHg)  SpO2  O2 Device Patient Position - Orthostatic VS   03/05/21 07:40:01  98 4 °F (36 9 °C)  92  19  125/80  95  92 %  None (Room air) --   03/04/21 21:22:39  98 °F (36 7 °C)  96  18  118/76  90  93 %  None (Room air) Lying   03/04/21 2100  --  --  --  --  --  94 %  None (Room air) --   03/04/21 2055  99 2 °F (37 3 °C)  100  18  139/78  94  90 %  None (Room air) Sitting   03/04/21 2015  --  101  18  126/58  85  93 %  None (Room air) Sitting   03/04/21 1915  --  102  18  145/66  95  92 %  -- --   03/04/21 1718  101 7 °F (38 7 °C)Abnormal   99  20  153/85  --  93 %  None (Room       Pertinent Labs/Diagnostic Test Results:   3/4 CT A/P: Colitis involving the descending and sigmoid colon   Bibasal opacities with airspace opacities in the right lower lobe, concerning for pneumonia   Recommend follow-up chest CT in 2 months to ensure resolution   Osseous lesion in the right femur,  imaging characteristics suggest a low-grade chondroid lesion, however metastasis is also a differential consideration   If prior CT or MRI  become available, this can be up loaded, and an addendum to this report can    be issued upon request      Results from last 7 days   Lab Units 03/04/21  1723   SARS-COV-2  Negative     Results from last 7 days   Lab Units 03/05/21  0442 03/04/21  1724   WBC Thousand/uL 7 18 5 85   HEMOGLOBIN g/dL 12 2 13 4   HEMATOCRIT % 37 2 40 4   PLATELETS Thousands/uL 56* 63*   NEUTROS ABS Thousands/µL 3 66 2 58         Results from last 7 days   Lab Units 03/05/21  0442 03/04/21  1724   SODIUM mmol/L 138 136   POTASSIUM mmol/L 4 2 4 5   CHLORIDE mmol/L 104 101   CO2 mmol/L 26 26   ANION GAP mmol/L 8 9   BUN mg/dL 24 27*   CREATININE mg/dL 1 70* 1 84*   EGFR ml/min/1 73sq m 36 33   CALCIUM mg/dL 8 1* 8 7     Results from last 7 days   Lab Units 03/04/21  1724   AST U/L 21   ALT U/L 23   ALK PHOS U/L 67   TOTAL PROTEIN g/dL 7 3   ALBUMIN g/dL 3 7   TOTAL BILIRUBIN mg/dL 0 64         Results from last 7 days   Lab Units 03/05/21  0442 03/04/21  1724   GLUCOSE RANDOM mg/dL 108 109       Results from last 7 days   Lab Units 03/04/21  2347   PROCALCITONIN ng/ml 0 39*     Results from last 7 days   Lab Units 03/04/21  2138   LACTIC ACID mmol/L 0 8       Results from last 7 days   Lab Units 03/04/21  1724   LIPASE u/L 114     Results from last 7 days   Lab Units 03/05/21  0656   CLARITY UA  Clear   COLOR UA  Yellow   SPEC GRAV UA  1 020   PH UA  5 0   GLUCOSE UA mg/dl Negative   KETONES UA mg/dl Trace*   BLOOD UA  Trace-Intact*   PROTEIN UA mg/dl Negative   NITRITE UA  Negative   BILIRUBIN UA  Negative   UROBILINOGEN UA E U /dl 0 2   LEUKOCYTES UA  Negative   WBC UA /hpf None Seen   RBC UA /hpf 0-5   BACTERIA UA /hpf None Seen   EPITHELIAL CELLS WET PREP /hpf None Seen     Results from last 7 days   Lab Units 03/04/21  1723   INFLUENZA A PCR  Negative   INFLUENZA B PCR  Negative   RSV PCR  Negative     Results from last 7 days   Lab Units 03/04/21  1730 03/04/21  1724   BLOOD CULTURE  Received in Microbiology Lab  Culture in Progress  Received in Microbiology Lab  Culture in Progress  ED Treatment:   Medication Administration from 03/04/2021 1716 to 03/04/2021 2110       Date/Time Order Dose Route Action     03/04/2021 1724 sodium chloride 0 9 % bolus 1,000 mL 1,000 mL Intravenous New Bag     03/04/2021 1725 ondansetron (ZOFRAN) injection 4 mg 4 mg Intravenous Given     03/04/2021 1733 acetaminophen (TYLENOL) tablet 650 mg 650 mg Oral Given     03/04/2021 1840 sodium chloride 0 9 % infusion 150 mL/hr Intravenous New Bag     03/04/2021 2029 metroNIDAZOLE (FLAGYL) IVPB (premix) 500 mg 100 mL 500 mg Intravenous New Bag     03/04/2021 2106 levofloxacin (LEVAQUIN) IVPB (premix in dextrose) 750 mg 150 mL 750 mg Intravenous New Bag        Past Medical History:   Diagnosis Date    Hypertension     Prostate cancer ([facility] )      Admitting Diagnosis: Diarrhea [R19 7]  Colitis [K52 9]  Pneumonia [J18 9]  KATELIN (acute kidney injury) ([facility] ) [N17 9]  Age/Sex: 78 y o  male  Admission Orders:  SCDS  I/O  Up with assist  Procal  Stool   NPO    Scheduled Medications:  gabapentin, 100 mg, Oral, HS  [START ON 3/6/2021] levofloxacin, 500 mg, Intravenous, Q48H  lisinopril, 10 mg, Oral, Daily  metroNIDAZOLE, 500 mg, Intravenous, Q8H      Continuous IV Infusions:     PRN Meds:  ondansetron, 4 mg, Intravenous, Q6H PRN        IP CONSULT TO CASE MANAGEMENT  IP CONSULT TO GASTROENTEROLOGY    Network Utilization Review Department  ATTENTION: Please call with any questions or concerns to [phone] and carefully listen to the prompts so that you are directed to the right person  All voicemails are confidential   Balwinder Beltrán all requests for admission clinical reviews, approved or denied determinations and any other requests to dedicated fax number below belonging to the campus where the patient is receiving treatment   List of dedicated fax numbers for the Facilities:  FACILITY NAME UR FAX NUMBER   ADMISSION DENIALS (Administrative/Medical Necessity) [phone]   [address] (Maternity/NICU/Pediatrics) [address] [facility],[address]  [phone]   [address] (  Marquez Chapman "Edilia" 103) [address] [address] [place] [address] [phone]   [place] [address] [phone]

## 2021-03-05 NOTE — ASSESSMENT & PLAN NOTE
· Diarrhea x1 week with fever 101 7  · CT abdomen pelvis:  Colitis involving the descending and sigmoid colon  · Advanced diet to clear liquid  · GI consult appreciated recommends to rule out C diff and enteric panel    Hold antibiotic till results come back  · Initially started on Flagyl and Levaquin--discontinued, depending on the C diff and enteric panel, will take the next step

## 2021-03-05 NOTE — ED NOTES
Pt ambulatory to bathroom      [address] [facility] Steffen, MARIA C  03/04/21 1952

## 2021-03-05 NOTE — ASSESSMENT & PLAN NOTE
· Follows with Hematology, last visit November 2019  · Bone marrow biopsy in 2018 with small 10% population of CLL per hematology note  · Continue outpatient follow-up

## 2021-03-05 NOTE — ASSESSMENT & PLAN NOTE
· POA fever 101 7, tachycardia 99, suspected infection  · Presented with generalized weakness and diarrhea x1 week  · History of CLL, chronic leukopenia  · Received 2 L of normal saline in the ER  · Blood cultures are pending  · Lactate 0 8  · Continue Levaquin-renally dosed

## 2021-03-05 NOTE — H&P
[facility] Internal Medicine    H&P- [place] 1940, 78 y o  male MRN: [id number]    Unit/Bed#: -01 Encounter: [id number]    Primary Care Provider: Tiarra Gutierrez DO   Date and time admitted to hospital: 3/4/2021  5:17 PM        * Sepsis without acute organ dysfunction ([address] )  Assessment & Plan  · POA fever 101 7, tachycardia 99, suspected infection  · Presented with generalized weakness and diarrhea x1 week  · History of CLL, chronic leukopenia  · Received 2 L of normal saline in the ER  · Blood cultures are pending  · Lactate 0 8  · Continue Levaquin-renally dosed    Colitis  Assessment & Plan  · Diarrhea x1 week with fever 101 7  · CT abdomen pelvis:  Colitis involving the descending and sigmoid colon  · NPO after midnight  · GI consult  · Check stool panel  · Hemoccult stool  · Stool record at bedside  · Continue Flagyl and Levaquin-renally dosed    Opacity of lung on imaging study  Assessment & Plan  · Bibasilar opacities with airspace opacities in the right lower lobe concerning for pneumonia  · No respiratory distress, oxygen saturation 93% on room air  · COVID negative  · Check procalcitonin  · Continue Levaquin  · Follow-up chest CT in 2 months to ensure resolution        Lesion of right femur  Assessment & Plan  · Osseous lesion in the right femur, imaging characteristics suggest a low-grade chondroid lesion, however metastasis is also differential consideration  · If prior or CT or MRI becomes available, these can be uploaded, and an addendum this report can be issued upon request   Unable to find these imaging studies in available records  · Follow-up bone scan for further evaluation    Hypercholesterolemia  Assessment & Plan  · Last lipid panel in March of 2019 unremarkable  · Continue Crestor  · Continue outpatient follow-up    Chronic back pain  Assessment & Plan  · Continue Neurontin-dose given for bedtime-continue with patient resumes diet    CLL (chronic lymphocytic leukemia) [facility])  Assessment & Plan  · Follows with Hematology, last visit November 2019  · Bone marrow biopsy in 2018 with small 10% population of CLL per hematology note  · Continue outpatient follow-up    Essential hypertension  Assessment & Plan  · BP reviewed and acceptable  · Continue lisinopril  · Monitor blood pressure    Chronic leukopenia  Assessment & Plan  · WBC 5 8 today  · Daily CBC and trend WBC  · Continue outpatient Hematology follow-up    Thrombocytopenia (HCC)  Assessment & Plan  · Platelets 63  · Daily CBC and trend platelets  · Monitor for bleeding    CKD (chronic kidney disease)  Assessment & Plan  Lab Results   Component Value Date    EGFR 33 03/04/2021    CREATININE 1 84 (H) 03/04/2021     · Baseline 1 5  · Daily metabolic panel and trend creatinine  · Caution with nephrotoxins avoid hypotension    VTE Prophylaxis: Pharmacologic VTE Prophylaxis contraindicated due to Rectal bleeding  / sequential compression device   Code Status:  Full  POLST: POLST form is not discussed and not completed at this time  Discussion with family:  Patient    Anticipated Length of Stay:  Patient will be admitted on an Inpatient basis with an anticipated length of stay of  greater than 2 midnights  Justification for Hospital Stay:  Per plan above    Total Time for Visit, including Counseling / Coordination of Care: 45 minutes  Greater than 50% of this total time spent on direct patient counseling and coordination of care  Chief Complaint:   Generalized weakness and diarrhea    History of Present Illness:    Isla Boxer is a 78 y o  male with history of prostate cancer, essential hypertension, CLL, chronic leukopenia, thrombocytopenia, hypercholesterolemia, CKD stage 3, chronic back pain who presents with generalized weakness and diarrhea  He says the diarrhea started on Monday, and he describes it as frequent and watery  He says that he has had bright red blood when he wipes, and relates it to his hemorrhoids  Diarrhea is accompanied by diffuse moderate crampy intermittent abdominal pain  He said that today he became very weak and had to sit down  He denies fever or chills, cough, shortness of breath, chest pain, vomiting, syncope, or focal weakness  Review of Systems:    Review of Systems   Constitutional: Positive for fatigue  Negative for chills and fever  Eyes: Negative for visual disturbance  Respiratory: Negative for cough and shortness of breath  Cardiovascular: Negative for chest pain, palpitations and leg swelling  Gastrointestinal: Positive for abdominal pain and diarrhea  Negative for abdominal distention, blood in stool, constipation, nausea and vomiting  Genitourinary: Negative for dysuria and flank pain  Musculoskeletal: Negative for arthralgias and myalgias  Skin: Negative for pallor and rash  Neurological: Negative for dizziness, seizures, syncope, numbness and headaches  All other systems reviewed and are negative  Past Medical and Surgical History:     Past Medical History:   Diagnosis Date    Hypertension     Prostate cancer [facility])        Past Surgical History:   Procedure Laterality Date    APPENDECTOMY      BACK SURGERY  2016    CATARACT EXTRACTION  2020    PROSTATE SURGERY         Meds/Allergies:    Prior to Admission medications    Medication Sig Start Date End Date Taking? Authorizing Provider   cholecalciferol (VITAMIN D3) 1,000 units tablet Take 1,000 Units by mouth daily   Yes Historical Provider, MD   gabapentin (NEURONTIN) 300 mg capsule  1/24/20  Yes Historical Provider, MD   lisinopril (ZESTRIL) 10 mg tablet  1/22/20  Yes Historical Provider, MD   rosuvastatin (CRESTOR) 5 mg tablet  1/21/20  Yes Historical Provider, MD     I have reviewed home medications with patient personally      Allergies: No Known Allergies    Social History:     Marital Status: /Civil Union   Occupation:  Retired  Patient Pre-hospital Living Situation:  Home  Patient Pre-hospital Level of Mobility:  Independent  Patient Pre-hospital Diet Restrictions:  None  Substance Use History:   Social History     Substance and Sexual Activity   Alcohol Use Not Currently     Social History     Tobacco Use   Smoking Status Former Smoker   Smokeless Tobacco Never Used   Tobacco Comment    Quit at age 23     Social History     Substance and Sexual Activity   Drug Use Not Currently       Family History:    non-contributory    Physical Exam:     Vitals:   Blood Pressure: 118/76 (03/04/21 2122)  Pulse: 96 (03/04/21 2122)  Temperature: 98 °F (36 7 °C) (03/04/21 2122)  Temp Source: Oral (03/04/21 2122)  Respirations: 18 (03/04/21 2122)  Height: 5' 8" (172 7 cm) (03/04/21 2122)  Weight - Scale: 85 6 kg (188 lb 11 4 oz) (03/04/21 2122)  SpO2: 93 % (03/04/21 2122)    Physical Exam  Vitals signs and nursing note reviewed  Constitutional:       Appearance: He is ill-appearing  HENT:      Head: Normocephalic and atraumatic  Mouth/Throat:      Mouth: Mucous membranes are moist       Pharynx: Oropharynx is clear  Eyes:      Extraocular Movements: Extraocular movements intact  Pupils: Pupils are equal, round, and reactive to light  Neck:      Musculoskeletal: Normal range of motion and neck supple  Cardiovascular:      Rate and Rhythm: Normal rate and regular rhythm  Pulses: Normal pulses  Heart sounds: Normal heart sounds  Pulmonary:      Effort: Pulmonary effort is normal       Breath sounds: Normal breath sounds  Abdominal:      General: Bowel sounds are normal       Palpations: Abdomen is soft  Tenderness: There is abdominal tenderness  Musculoskeletal: Normal range of motion  Skin:     General: Skin is warm and dry  Capillary Refill: Capillary refill takes less than 2 seconds  Neurological:      General: No focal deficit present  Mental Status: He is alert and oriented to person, place, and time           Additional Data:     Lab Results: I have personally reviewed pertinent reports  Results from last 7 days   Lab Units 03/04/21  1724   WBC Thousand/uL 5 85   HEMOGLOBIN g/dL 13 4   HEMATOCRIT % 40 4   PLATELETS Thousands/uL 63*   NEUTROS PCT % 44   LYMPHS PCT % 30   MONOS PCT % 23*   EOS PCT % 2     Results from last 7 days   Lab Units 03/04/21  1724   SODIUM mmol/L 136   POTASSIUM mmol/L 4 5   CHLORIDE mmol/L 101   CO2 mmol/L 26   BUN mg/dL 27*   CREATININE mg/dL 1 84*   ANION GAP mmol/L 9   CALCIUM mg/dL 8 7   ALBUMIN g/dL 3 7   TOTAL BILIRUBIN mg/dL 0 64   ALK PHOS U/L 67   ALT U/L 23   AST U/L 21   GLUCOSE RANDOM mg/dL 109                 Results from last 7 days   Lab Units 03/04/21  2138   LACTIC ACID mmol/L 0 8       Imaging: I have personally reviewed pertinent reports  CT abdomen pelvis wo contrast   Final Result by Ha Maxwell MD (03/04 1955)      1  Colitis involving the descending and sigmoid colon   2  Bibasal opacities with airspace opacities in the right lower lobe, concerning for pneumonia  Recommend follow-up chest CT in 2 months to ensure resolution   3  Osseous lesion in the right femur,  imaging characteristics suggest a low-grade chondroid lesion, however metastasis is also a differential consideration  If prior CT or MRI  become available, this can be up loaded, and an addendum to this report can    be issued upon request   Otherwise suggest follow-up bone scan for further evaluation      The study was marked in [facility] for immediate notification  Workstation performed: [id number]             [employer] Records Reviewed: Yes     ** Please Note: This note has been constructed using a voice recognition system   **

## 2021-03-05 NOTE — PROGRESS NOTES
Progress Note Latia Ramirez 1940, 78 y o  male MRN: [id number]    Unit/Bed#: -01 Encounter: [id number]    Primary Care Provider: Monet Bautista DO   Date and time admitted to hospital: 3/4/2021  5:17 PM        Colitis  Assessment & Plan  · Diarrhea x1 week with fever 101 7  · CT abdomen pelvis:  Colitis involving the descending and sigmoid colon  · Advanced diet to clear liquid  · GI consult appreciated recommends to rule out C diff and enteric panel    Hold antibiotic till results come back  · Initially started on Flagyl and Levaquin--discontinued, depending on the C diff and enteric panel, will take the next step    * Sepsis without acute organ dysfunction (HCC)  Assessment & Plan  · POA fever 101 7, tachycardia 99, suspected infection-colitis  · Presented with generalized weakness and diarrhea x1 week  · History of CLL, chronic leukopenia  · Received 2 L of normal saline in the ER  · Blood cultures are pending  · Lactate 0 8  · Patient received metronidazole and Levaquin-remain on hold as per GI recommendation to rule out C diff    Lesion of right femur  Assessment & Plan  · Osseous lesion in the right femur, imaging characteristics suggest a low-grade chondroid lesion, however metastasis is also differential consideration  · If prior  CT or MRI becomes available, these can be uploaded, and an addendum this report can be issued upon request   Unable to find these imaging studies in available records  · Follow CT scan of right femur    Hypercholesterolemia  Assessment & Plan  · Last lipid panel in March of 2019 unremarkable  · Continue Crestor  · Continue outpatient follow-up    Opacity of lung on imaging study  Assessment & Plan  · Bibasilar opacities with airspace opacities in the right lower lobe concerning for pneumonia  · No respiratory distress, oxygen saturation 93% on room air  · COVID negative  · Check procalcitonin  · Continue Levaquin  · Follow-up chest CT in 2 months to ensure resolution        Chronic back pain  Assessment & Plan  · Continue Neurontin-dose given for bedtime-continue with patient resumes diet    CLL (chronic lymphocytic leukemia) ([address] )  Assessment & Plan  · Follows with Hematology, last visit November 2019  · Bone marrow biopsy in 2018 with small 10% population of CLL per hematology note  · Continue outpatient follow-up    Essential hypertension  Assessment & Plan  · BP reviewed and acceptable  · Continue lisinopril  · Monitor blood pressure    Chronic leukopenia  Assessment & Plan  · WBC 5 8 today  · Daily CBC and trend WBC  · Continue outpatient Hematology follow-up    Thrombocytopenia ([address] )  Assessment & Plan  · Platelets 53>11  · Daily CBC and trend platelets  · Monitor for bleeding    CKD (chronic kidney disease)  Assessment & Plan  Lab Results   Component Value Date    EGFR 36 03/05/2021    EGFR 33 03/04/2021    CREATININE 1 70 (H) 03/05/2021    CREATININE 1 84 (H) 03/04/2021     · Baseline 1 5  · Daily metabolic panel and trend creatinine  · Caution with nephrotoxins avoid hypotension        VTE Pharmacologic Prophylaxis:   Pharmacologic: Patient is having thrombocytopenia, platelet count [place] [address]  Mechanical VTE Prophylaxis in Place: Yes    Patient Centered Rounds: I have performed bedside rounds with nursing staff today  Discussions with Specialists or Other Care Team Provider:  Gastroenterology    Education and Discussions with Family / Patient:  Yes with patient    Time Spent for Care: 20 minutes  More than 50% of total time spent on counseling and coordination of care as described above  Current Length of Stay: 1 day(s)    Current Patient Status: Inpatient   Certification Statement: The patient will continue to require additional inpatient hospital stay due to Sepsis, colitis, thrombocytopenia    Discharge Plan / Estimated Discharge Date: To be determined      Code Status: Level 1 - Full Code      Subjective:   Seen and evaluated during the round  Patient is still complaining of mild abdominal pain  Denies any nausea, vomiting,     Objective:     Vitals:   Temp (24hrs), Av 3 °F (37 4 °C), Min:98 °F (36 7 °C), Max:101 7 °F (38 7 °C)    Temp:  [98 °F (36 7 °C)-101 7 °F (38 7 °C)] 98 4 °F (36 9 °C)  HR:  [] 92  Resp:  [18-20] 19  BP: (118-153)/(58-85) 125/80  SpO2:  [90 %-94 %] 92 %  Body mass index is 28 69 kg/m²  Input and Output Summary (last 24 hours): Intake/Output Summary (Last 24 hours) at 3/5/2021 1505  Last data filed at 3/5/2021 1443  Gross per 24 hour   Intake 1100 ml   Output 350 ml   Net 750 ml       Physical Exam:     Physical Exam  Vitals signs and nursing note reviewed  Constitutional:       Appearance: He is not diaphoretic  HENT:      Mouth/Throat:      Pharynx: No oropharyngeal exudate  Eyes:      General: No scleral icterus  Conjunctiva/sclera: Conjunctivae normal       Pupils: Pupils are equal, round, and reactive to light  Neck:      Musculoskeletal: Normal range of motion  Cardiovascular:      Rate and Rhythm: Normal rate  Heart sounds: No gallop  Pulmonary:      Effort: Pulmonary effort is normal  No respiratory distress  Breath sounds: No stridor  No wheezing or rhonchi  Abdominal:      General: Abdomen is flat  Palpations: Abdomen is soft  Tenderness: There is abdominal tenderness  There is no right CVA tenderness, guarding or rebound  Hernia: No hernia is present  Musculoskeletal: Normal range of motion  Right lower leg: No edema  Left lower leg: No edema  Lymphadenopathy:      Cervical: No cervical adenopathy  Skin:     General: Skin is warm  Capillary Refill: Capillary refill takes less than 2 seconds  Findings: No lesion  Neurological:      General: No focal deficit present  Mental Status: He is alert and oriented to person, place, and time           Additional Data:     Labs:    Results from last 7 days   Lab Units 21  0442   WBC Thousand/uL 7 18   HEMOGLOBIN g/dL 12 2   HEMATOCRIT % 37 2   PLATELETS Thousands/uL 56*   NEUTROS PCT % 51   LYMPHS PCT % 21   MONOS PCT % 27*   EOS PCT % 0     Results from last 7 days   Lab Units 03/05/21  0442 03/04/21  1724   POTASSIUM mmol/L 4 2 4 5   CHLORIDE mmol/L 104 101   CO2 mmol/L 26 26   BUN mg/dL 24 27*   CREATININE mg/dL 1 70* 1 84*   CALCIUM mg/dL 8 1* 8 7   ALK PHOS U/L  --  67   ALT U/L  --  23   AST U/L  --  21           * I Have Reviewed All Lab Data Listed Above  * Additional Pertinent Lab Tests Reviewed: All Labs Within Last 24 Hours Reviewed        Recent Cultures (last 7 days):     Results from last 7 days   Lab Units 03/04/21  1730 03/04/21  1724   BLOOD CULTURE  Received in Microbiology Lab  Culture in Progress  Received in Microbiology Lab  Culture in Progress  Last 24 Hours Medication List:   Current Facility-Administered Medications   Medication Dose Route Frequency Provider Last Rate    gabapentin  100 mg Oral HS Alise Pill, CRNP      lisinopril  10 mg Oral Daily Alise Pill, CRNP      ondansetron  4 mg Intravenous Q6H PRN Alise Pill, CRNP          Today, Patient Was Seen By: Ihsan Murillo MD    ** Please Note: Dragon 360 Dictation voice to text software may have been used in the creation of this document   **

## 2021-03-05 NOTE — ASSESSMENT & PLAN NOTE
· Bibasilar opacities with airspace opacities in the right lower lobe concerning for pneumonia  · No respiratory distress, oxygen saturation 93% on room air  · COVID negative  · Check procalcitonin  · Continue Levaquin  · Follow-up chest CT in 2 months to ensure resolution

## 2021-03-05 NOTE — SOCIAL WORK
CM met with the patient at bedside to review the CM role and discuss possible dc needs  At time of interview pt is AAOx4, pt  Lives with his wife in a [address] [facility] in [place]  Pt was IPTA  Pt has no DME and is ambulatory without assistance  Pt has bathroom/bedroom on third floor with a full flight of steps  Pt denies any history of drug/etoh abuse, mental illness or inpatient psych admissions  Pt denies any history of SNF/Rehab or VNA  Pt does have a  POA, his wife and two daughters  Pt does have a LW  Pt drives, pt family is available for transportation needs    [facility]  Contact: carl, wife, [phone]  PCP: Dr Kari Greer    CM reviewed d/c planning process including the following: identifying help at home, patient preference for d/c planning needs, availability of treatment team to discuss questions or concerns patient and/or family may have regarding understanding medications and recognizing signs and symptoms once discharged  CM also encouraged patient to follow up with all recommended appointments after discharge  Patient advised of importance for patient and family to participate in managing patients medical well being

## 2021-03-05 NOTE — ASSESSMENT & PLAN NOTE
· POA fever 101 7, tachycardia 99, suspected infection-colitis  · Presented with generalized weakness and diarrhea x1 week  · History of CLL, chronic leukopenia  · Received 2 L of normal saline in the ER  · Blood cultures are pending  · Lactate 0 8  · Patient received metronidazole and Levaquin-remain on hold as per GI recommendation to rule out C diff

## 2021-03-05 NOTE — ASSESSMENT & PLAN NOTE
Lab Results   Component Value Date    EGFR 33 03/04/2021    CREATININE 1 84 (H) 03/04/2021     · Baseline 1 5  · Daily metabolic panel and trend creatinine  · Caution with nephrotoxins avoid hypotension

## 2021-03-06 PROBLEM — A04.72 C. DIFFICILE COLITIS: Status: ACTIVE | Noted: 2021-03-04

## 2021-03-06 LAB
ALBUMIN SERPL BCP-MCNC: 3.1 G/DL (ref 3.5–5)
ALP SERPL-CCNC: 62 U/L (ref 46–116)
ALT SERPL W P-5'-P-CCNC: 25 U/L (ref 12–78)
ANION GAP SERPL CALCULATED.3IONS-SCNC: 9 MMOL/L (ref 4–13)
AST SERPL W P-5'-P-CCNC: 21 U/L (ref 5–45)
BASOPHILS # BLD AUTO: 0.01 THOUSANDS/ΜL (ref 0–0.1)
BASOPHILS NFR BLD AUTO: 0 % (ref 0–1)
BILIRUB SERPL-MCNC: 0.67 MG/DL (ref 0.2–1)
BUN SERPL-MCNC: 20 MG/DL (ref 5–25)
C DIFF TOX A+B STL QL IA: POSITIVE
C DIFF TOX B TCDB STL QL NAA+PROBE: POSITIVE
CALCIUM ALBUM COR SERPL-MCNC: 8.5 MG/DL (ref 8.3–10.1)
CALCIUM SERPL-MCNC: 7.8 MG/DL (ref 8.3–10.1)
CAMPYLOBACTER DNA SPEC NAA+PROBE: NORMAL
CHLORIDE SERPL-SCNC: 102 MMOL/L (ref 100–108)
CO2 SERPL-SCNC: 25 MMOL/L (ref 21–32)
CREAT SERPL-MCNC: 1.52 MG/DL (ref 0.6–1.3)
EOSINOPHIL # BLD AUTO: 0 THOUSAND/ΜL (ref 0–0.61)
EOSINOPHIL NFR BLD AUTO: 0 % (ref 0–6)
ERYTHROCYTE [DISTWIDTH] IN BLOOD BY AUTOMATED COUNT: 13.4 % (ref 11.6–15.1)
GFR SERPL CREATININE-BSD FRML MDRD: 42 ML/MIN/1.73SQ M
GLUCOSE SERPL-MCNC: 113 MG/DL (ref 65–140)
HCT VFR BLD AUTO: 37.1 % (ref 36.5–49.3)
HGB BLD-MCNC: 12.3 G/DL (ref 12–17)
IMM GRANULOCYTES # BLD AUTO: 0.06 THOUSAND/UL (ref 0–0.2)
IMM GRANULOCYTES NFR BLD AUTO: 1 % (ref 0–2)
LACTATE SERPL-SCNC: 1 MMOL/L (ref 0.5–2)
LYMPHOCYTES # BLD AUTO: 1.68 THOUSANDS/ΜL (ref 0.6–4.47)
LYMPHOCYTES NFR BLD AUTO: 25 % (ref 14–44)
MCH RBC QN AUTO: 31.7 PG (ref 26.8–34.3)
MCHC RBC AUTO-ENTMCNC: 33.2 G/DL (ref 31.4–37.4)
MCV RBC AUTO: 96 FL (ref 82–98)
MONOCYTES # BLD AUTO: 1.38 THOUSAND/ΜL (ref 0.17–1.22)
MONOCYTES NFR BLD AUTO: 20 % (ref 4–12)
NEUTROPHILS # BLD AUTO: 3.62 THOUSANDS/ΜL (ref 1.85–7.62)
NEUTS SEG NFR BLD AUTO: 54 % (ref 43–75)
NRBC BLD AUTO-RTO: 0 /100 WBCS
PLATELET # BLD AUTO: 57 THOUSANDS/UL (ref 149–390)
PMV BLD AUTO: 11.6 FL (ref 8.9–12.7)
POTASSIUM SERPL-SCNC: 4.1 MMOL/L (ref 3.5–5.3)
PROCALCITONIN SERPL-MCNC: 0.41 NG/ML
PROT SERPL-MCNC: 6.2 G/DL (ref 6.4–8.2)
RBC # BLD AUTO: 3.88 MILLION/UL (ref 3.88–5.62)
SALMONELLA DNA SPEC QL NAA+PROBE: NORMAL
SHIGA TOXIN STX GENE SPEC NAA+PROBE: NORMAL
SHIGELLA DNA SPEC QL NAA+PROBE: NORMAL
SODIUM SERPL-SCNC: 136 MMOL/L (ref 136–145)
WBC # BLD AUTO: 6.75 THOUSAND/UL (ref 4.31–10.16)

## 2021-03-06 PROCEDURE — 80053 COMPREHEN METABOLIC PANEL: CPT | Performed by: NURSE PRACTITIONER

## 2021-03-06 PROCEDURE — 83605 ASSAY OF LACTIC ACID: CPT | Performed by: FAMILY MEDICINE

## 2021-03-06 PROCEDURE — 85025 COMPLETE CBC W/AUTO DIFF WBC: CPT | Performed by: NURSE PRACTITIONER

## 2021-03-06 PROCEDURE — 99232 SBSQ HOSP IP/OBS MODERATE 35: CPT | Performed by: FAMILY MEDICINE

## 2021-03-06 PROCEDURE — 84145 PROCALCITONIN (PCT): CPT | Performed by: NURSE PRACTITIONER

## 2021-03-06 RX ORDER — OXYCODONE HYDROCHLORIDE 5 MG/1
5 TABLET ORAL EVERY 4 HOURS PRN
Status: DISCONTINUED | OUTPATIENT
Start: 2021-03-06 | End: 2021-03-08 | Stop reason: HOSPADM

## 2021-03-06 RX ORDER — SODIUM CHLORIDE 9 MG/ML
100 INJECTION, SOLUTION INTRAVENOUS CONTINUOUS
Status: DISCONTINUED | OUTPATIENT
Start: 2021-03-06 | End: 2021-03-07

## 2021-03-06 RX ADMIN — GABAPENTIN 100 MG: 100 CAPSULE ORAL at 21:20

## 2021-03-06 RX ADMIN — Medication 125 MG: at 11:36

## 2021-03-06 RX ADMIN — LISINOPRIL 10 MG: 10 TABLET ORAL at 07:42

## 2021-03-06 RX ADMIN — Medication 125 MG: at 17:27

## 2021-03-06 RX ADMIN — Medication 125 MG: at 07:42

## 2021-03-06 RX ADMIN — MORPHINE SULFATE 2 MG: 2 INJECTION, SOLUTION INTRAMUSCULAR; INTRAVENOUS at 03:58

## 2021-03-06 RX ADMIN — Medication 125 MG: at 23:51

## 2021-03-06 RX ADMIN — SODIUM CHLORIDE 100 ML/HR: 0.9 INJECTION, SOLUTION INTRAVENOUS at 12:18

## 2021-03-06 RX ADMIN — MORPHINE SULFATE 2 MG: 2 INJECTION, SOLUTION INTRAMUSCULAR; INTRAVENOUS at 03:11

## 2021-03-06 RX ADMIN — SODIUM CHLORIDE 100 ML/HR: 0.9 INJECTION, SOLUTION INTRAVENOUS at 21:25

## 2021-03-06 NOTE — ASSESSMENT & PLAN NOTE
· Diarrhea x1 week with fever 101 7  · CT abdomen pelvis:  Colitis involving the descending and sigmoid colon  · On 03/05/2021-due to patient was insisting for foot, patient was placed on clear liquid diet  Overnight patient require multiple dose of narcotics for pain  Will keep patient NPO at this time continue IV fluid  · GI consult appreciated recommends to rule out C diff and enteric panel  Hold antibiotic till results come back  · Initially started on Flagyl and Levaquin--discontinued,  · C diff positive, patient started on vancomycin    · Stool enteric panel negative  · Continue serial abdominal exams-if there is any concern of getting worse of signs or symptoms, consider to re-scanned the patient

## 2021-03-06 NOTE — PLAN OF CARE
Problem: Potential for Falls  Goal: Patient will remain free of falls  Description: INTERVENTIONS:  - Assess patient frequently for physical needs  -  Identify cognitive and physical deficits and behaviors that affect risk of falls    -  [place] fall precautions as indicated by assessment   - Educate patient/family on patient safety including physical limitations  - Instruct patient to call for assistance with activity based on assessment  - Modify environment to reduce risk of injury  - Consider OT/PT consult to assist with strengthening/mobility  Outcome: Progressing

## 2021-03-06 NOTE — ASSESSMENT & PLAN NOTE
· POA fever 101 7, tachycardia 99, suspected infection-colitis  · Presented with generalized weakness and diarrhea x1 week  · History of CLL, chronic leukopenia  · Received 2 L of normal saline in the ER  · Blood culture shows no growth in 2-4 hours  · Lactate 0 8  · Patient received metronidazole and Levaquin-discontinue yesterday  · C diff positive, patient started p o  Vancomycin  · Continue serial abdominal exam, if any signs symptoms of acute abdomen, consider repeat scan and consult General surgery  On 03/05/2021-due to patient was insisting for foot, patient was placed on clear liquid diet  Overnight patient require multiple dose of narcotics for pain    Will keep patient NPO at this time continue IV fluid

## 2021-03-06 NOTE — ASSESSMENT & PLAN NOTE
Lab Results   Component Value Date    EGFR 42 03/06/2021    EGFR 36 03/05/2021    EGFR 33 03/04/2021    CREATININE 1 52 (H) 03/06/2021    CREATININE 1 70 (H) 03/05/2021    CREATININE 1 84 (H) 03/04/2021     · Baseline 1 5-creatinine is almost close to baseline      · Patient will be placed on NPO, continue IV hydration  · Daily metabolic panel and trend creatinine  · Caution with nephrotoxins avoid hypotension

## 2021-03-06 NOTE — ASSESSMENT & PLAN NOTE
· Bibasilar opacities with airspace opacities in the right lower lobe concerning for pneumonia  · No respiratory distress, oxygen saturation 93% on room air  · COVID negative  · Since C diff positive, Levaquin discontinued    Patient started on vancomycin  · Follow-up chest CT in 2 months to ensure resolution  · Procalcitonin x1 elevated most likely secondary to C diff colitis

## 2021-03-06 NOTE — PROGRESS NOTES
Progress Note Andrew Sanchez 1940, 78 y o  male MRN: [id number]    Unit/Bed#: -01 Encounter: [id number]    Primary Care Provider: Charity Redd DO   Date and time admitted to hospital: 3/4/2021  5:17 PM        C  difficile colitis  Assessment & Plan  · Diarrhea x1 week with fever 101 7  · CT abdomen pelvis:  Colitis involving the descending and sigmoid colon  · On 03/05/2021-due to patient was insisting for foot, patient was placed on clear liquid diet  Overnight patient require multiple dose of narcotics for pain  Will keep patient NPO at this time continue IV fluid  · GI consult appreciated recommends to rule out C diff and enteric panel  Hold antibiotic till results come back  · Initially started on Flagyl and Levaquin--discontinued,  · C diff positive, patient started on vancomycin  · Stool enteric panel negative  · Continue serial abdominal exams-if there is any concern of getting worse of signs or symptoms, consider to re-scanned the patient    * Sepsis without acute organ dysfunction (HCC)  Assessment & Plan  · POA fever 101 7, tachycardia 99, suspected infection-colitis  · Presented with generalized weakness and diarrhea x1 week  · History of CLL, chronic leukopenia  · Received 2 L of normal saline in the ER  · Blood culture shows no growth in 2-4 hours  · Lactate 0 8  · Patient received metronidazole and Levaquin-discontinue yesterday  · C diff positive, patient started p o  Vancomycin  · Continue serial abdominal exam, if any signs symptoms of acute abdomen, consider repeat scan and consult General surgery  On 03/05/2021-due to patient was insisting for foot, patient was placed on clear liquid diet  Overnight patient require multiple dose of narcotics for pain    Will keep patient NPO at this time continue IV fluid    Lesion of right femur  Assessment & Plan  · Osseous lesion in the right femur, imaging characteristics suggest a low-grade chondroid lesion, however metastasis is also differential consideration  · If prior  CT or MRI becomes available, these can be uploaded, and an addendum this report can be issued upon request   Bacilio Leahy to find these imaging studies in available records  · Follow CT scan of right femur    Hypercholesterolemia  Assessment & Plan  · Last lipid panel in March of 2019 unremarkable  · Continue Crestor  · Continue outpatient follow-up    Opacity of lung on imaging study  Assessment & Plan  · Bibasilar opacities with airspace opacities in the right lower lobe concerning for pneumonia  · No respiratory distress, oxygen saturation 93% on room air  · COVID negative  · Since C diff positive, Levaquin discontinued  Patient started on vancomycin  · Follow-up chest CT in 2 months to ensure resolution  · Procalcitonin x1 elevated most likely secondary to C diff colitis        Chronic back pain  Assessment & Plan  · Continue Neurontin-dose given for bedtime-continue with patient resumes diet    CLL (chronic lymphocytic leukemia) ([facility], [place] [address] )  Assessment & Plan  · Follows with Hematology, last visit November 2019  · Bone marrow biopsy in 2018 with small 10% population of CLL per hematology note  · Continue outpatient follow-up    Essential hypertension  Assessment & Plan  · BP reviewed and acceptable  · Continue lisinopril  · Monitor blood pressure    Chronic leukopenia  Assessment & Plan  · WBC 5 8 today  · Daily CBC and trend WBC  · Continue outpatient Hematology follow-up    Thrombocytopenia ([facility], [place] [address] )  Assessment & Plan  · Platelets 71>85>11  · Daily CBC and trend platelets  · Monitor for bleeding    CKD (chronic kidney disease)  Assessment & Plan  Lab Results   Component Value Date    EGFR 42 03/06/2021    EGFR 36 03/05/2021    EGFR 33 03/04/2021    CREATININE 1 52 (H) 03/06/2021    CREATININE 1 70 (H) 03/05/2021    CREATININE 1 84 (H) 03/04/2021     · Baseline 1 5-creatinine is almost close to baseline      · Patient will be placed on NPO, continue IV hydration  · Daily metabolic panel and trend creatinine  · Caution with nephrotoxins avoid hypotension      VTE Pharmacologic Prophylaxis:   Pharmacologic: Not on any anticoagulation due to thrombocytopenia  Mechanical VTE Prophylaxis in Place: Yes    Patient Centered Rounds: I have performed bedside rounds with nursing staff today  Education and Discussions with Family / Patient:  Yes with patient and his wife over the phone    Time Spent for Care: 20 minutes  More than 50% of total time spent on counseling and coordination of care as described above  Current Length of Stay: 2 day(s)    Current Patient Status: Inpatient   Certification Statement: The patient will continue to require additional inpatient hospital stay due to C diff colitis, sepsis    Discharge Plan / Estimated Discharge Date: To be determined      Code Status: Level 1 - Full Code      Subjective:   Seen and evaluated during the round  Patient reports he feels a little better but is still having abdominal pain  Patient also reports last night he had severe pain and required multiple narcotics  Also had multiple bowel movements last night  No blood  Objective:     Vitals:   Temp (24hrs), Av 4 °F (37 4 °C), Min:99 1 °F (37 3 °C), Max:99 6 °F (37 6 °C)    Temp:  [99 1 °F (37 3 °C)-99 6 °F (37 6 °C)] 99 4 °F (37 4 °C)  HR:  [] 101  Resp:  [17-19] 19  BP: (135-152)/(74-91) 138/81  SpO2:  [90 %-92 %] 90 %  Body mass index is 28 69 kg/m²  Input and Output Summary (last 24 hours): Intake/Output Summary (Last 24 hours) at 3/6/2021 1139  Last data filed at 3/6/2021 0935  Gross per 24 hour   Intake 52884 ml   Output 200 ml   Net 83265 ml       Physical Exam:     Physical Exam  Vitals signs and nursing note reviewed  Exam conducted with a chaperone present  HENT:      Nose: No congestion  Eyes:      General: No scleral icterus  Conjunctiva/sclera: Conjunctivae normal       Pupils: Pupils are equal, round, and reactive to light     Neck: Musculoskeletal: Normal range of motion  Cardiovascular:      Rate and Rhythm: Normal rate  Heart sounds: No friction rub  No gallop  Pulmonary:      Effort: Pulmonary effort is normal  No respiratory distress  Breath sounds: No stridor  No wheezing  Abdominal:      General: Abdomen is flat  There is no distension  Palpations: Abdomen is soft  Tenderness: There is abdominal tenderness (on deep palpation)  Musculoskeletal: Normal range of motion  Right lower leg: No edema  Left lower leg: No edema  Lymphadenopathy:      Cervical: No cervical adenopathy  Skin:     General: Skin is warm  Capillary Refill: Capillary refill takes less than 2 seconds  Findings: No lesion  Neurological:      General: No focal deficit present  Mental Status: He is alert and oriented to person, place, and time  Cranial Nerves: No cranial nerve deficit  Sensory: No sensory deficit  Motor: No weakness  Coordination: Coordination normal        Additional Data:     Labs:    Results from last 7 days   Lab Units 03/06/21  0419   WBC Thousand/uL 6 75   HEMOGLOBIN g/dL 12 3   HEMATOCRIT % 37 1   PLATELETS Thousands/uL 57*   NEUTROS PCT % 54   LYMPHS PCT % 25   MONOS PCT % 20*   EOS PCT % 0     Results from last 7 days   Lab Units 03/06/21  0429   POTASSIUM mmol/L 4 1   CHLORIDE mmol/L 102   CO2 mmol/L 25   BUN mg/dL 20   CREATININE mg/dL 1 52*   CALCIUM mg/dL 7 8*   ALK PHOS U/L 62   ALT U/L 25   AST U/L 21           * I Have Reviewed All Lab Data Listed Above  * Additional Pertinent Lab Tests Reviewed: All Labs Within Last 24 Hours Reviewed      Recent Cultures (last 7 days):     Results from last 7 days   Lab Units 03/05/21  1730 03/04/21  1730 03/04/21  1724   BLOOD CULTURE   --  No Growth at 24 hrs  No Growth at 24 hrs     C DIFF TOXIN B BY PCR  Positive*  --   --        Last 24 Hours Medication List:   Current Facility-Administered Medications   Medication Dose Route Frequency Provider Last Rate    gabapentin  100 mg Oral HS MAGDY Linares      lisinopril  10 mg Oral Daily MAGDY Linares      morphine injection  2 mg Intravenous Q4H PRN Rissa S Kel, CRNP      ondansetron  4 mg Intravenous Q6H PRN MAGDY Linares      oxyCODONE  5 mg Oral Q4H PRN Rissa S Kel, CRNP      sodium chloride  100 mL/hr Intravenous Continuous Sarahy Rivas MD      vancomycin  125 mg Oral Q6H [address] Rissa S Kel, CRNP          Today, Patient Was Seen By: Sarahy Rivas MD    ** Please Note: Dragon 360 Dictation voice to text software may have been used in the creation of this document   **

## 2021-03-06 NOTE — SOCIAL WORK
Current LOS 2 days  Pt here with C diff, pt on PO Vanco  Sepsis, continue serial abd scan, if any signs of acute abd, consider repeat scan and consult General Surgery  Pt is NPO  Discharge plan is to return home with family, Cm to follow

## 2021-03-06 NOTE — PLAN OF CARE
Problem: GASTROINTESTINAL - ADULT  Goal: Minimal or absence of nausea and/or vomiting  Description: INTERVENTIONS:  - Administer IV fluids if ordered to ensure adequate hydration  - Maintain NPO status until nausea and vomiting are resolved  - Nasogastric tube if ordered  - Administer ordered antiemetic medications as needed  - Provide nonpharmacologic comfort measures as appropriate  - Advance diet as tolerated, if ordered  - Consider nutrition services referral to assist patient with adequate nutrition and appropriate food choices  3/6/2021 1034 by Sri Cross RN  Outcome: Progressing  3/6/2021 1034 by Sri Cross RN  Outcome: Progressing  Goal: Maintains or returns to baseline bowel function  Description: INTERVENTIONS:  - Assess bowel function  - Encourage oral fluids to ensure adequate hydration  - Administer IV fluids if ordered to ensure adequate hydration  - Administer ordered medications as needed  - Encourage mobilization and activity  - Consider nutritional services referral to assist patient with adequate nutrition and appropriate food choices  3/6/2021 1034 by Sri Cross RN  Outcome: Not Progressing  3/6/2021 1034 by Sri Cross RN  Outcome: Not Progressing  Goal: Maintains adequate nutritional intake  Description: INTERVENTIONS:  - Monitor percentage of each meal consumed  - Identify factors contributing to decreased intake, treat as appropriate  - Assist with meals as needed  - Monitor I&O, weight, and lab values if indicated  - Obtain nutrition services referral as needed  3/6/2021 1034 by Sri Cross RN  Outcome: Not Progressing  3/6/2021 1034 by Sri Cross RN  Outcome: Not Progressing

## 2021-03-06 NOTE — ASSESSMENT & PLAN NOTE
· Osseous lesion in the right femur, imaging characteristics suggest a low-grade chondroid lesion, however metastasis is also differential consideration  · If prior  CT or MRI becomes available, these can be uploaded, and an addendum this report can be issued upon request   Unable to find these imaging studies in available records  · Follow CT scan of right femur

## 2021-03-07 LAB
ALBUMIN SERPL BCP-MCNC: 2.6 G/DL (ref 3.5–5)
ALP SERPL-CCNC: 55 U/L (ref 46–116)
ALT SERPL W P-5'-P-CCNC: 25 U/L (ref 12–78)
ANION GAP SERPL CALCULATED.3IONS-SCNC: 8 MMOL/L (ref 4–13)
AST SERPL W P-5'-P-CCNC: 22 U/L (ref 5–45)
BASOPHILS # BLD AUTO: 0.01 THOUSANDS/ΜL (ref 0–0.1)
BASOPHILS NFR BLD AUTO: 0 % (ref 0–1)
BILIRUB SERPL-MCNC: 0.49 MG/DL (ref 0.2–1)
BUN SERPL-MCNC: 23 MG/DL (ref 5–25)
CALCIUM ALBUM COR SERPL-MCNC: 8.6 MG/DL (ref 8.3–10.1)
CALCIUM SERPL-MCNC: 7.5 MG/DL (ref 8.3–10.1)
CHLORIDE SERPL-SCNC: 107 MMOL/L (ref 100–108)
CO2 SERPL-SCNC: 24 MMOL/L (ref 21–32)
CREAT SERPL-MCNC: 1.54 MG/DL (ref 0.6–1.3)
EOSINOPHIL # BLD AUTO: 0 THOUSAND/ΜL (ref 0–0.61)
EOSINOPHIL NFR BLD AUTO: 0 % (ref 0–6)
ERYTHROCYTE [DISTWIDTH] IN BLOOD BY AUTOMATED COUNT: 13.6 % (ref 11.6–15.1)
GFR SERPL CREATININE-BSD FRML MDRD: 41 ML/MIN/1.73SQ M
GLUCOSE SERPL-MCNC: 109 MG/DL (ref 65–140)
HCT VFR BLD AUTO: 36.1 % (ref 36.5–49.3)
HGB BLD-MCNC: 12 G/DL (ref 12–17)
IMM GRANULOCYTES # BLD AUTO: 0.1 THOUSAND/UL (ref 0–0.2)
IMM GRANULOCYTES NFR BLD AUTO: 1 % (ref 0–2)
LYMPHOCYTES # BLD AUTO: 1.59 THOUSANDS/ΜL (ref 0.6–4.47)
LYMPHOCYTES NFR BLD AUTO: 20 % (ref 14–44)
MCH RBC QN AUTO: 31.2 PG (ref 26.8–34.3)
MCHC RBC AUTO-ENTMCNC: 33.2 G/DL (ref 31.4–37.4)
MCV RBC AUTO: 94 FL (ref 82–98)
MONOCYTES # BLD AUTO: 1.65 THOUSAND/ΜL (ref 0.17–1.22)
MONOCYTES NFR BLD AUTO: 21 % (ref 4–12)
NEUTROPHILS # BLD AUTO: 4.45 THOUSANDS/ΜL (ref 1.85–7.62)
NEUTS SEG NFR BLD AUTO: 58 % (ref 43–75)
NRBC BLD AUTO-RTO: 0 /100 WBCS
PLATELET # BLD AUTO: 56 THOUSANDS/UL (ref 149–390)
PMV BLD AUTO: 11.3 FL (ref 8.9–12.7)
POTASSIUM SERPL-SCNC: 4.1 MMOL/L (ref 3.5–5.3)
PROT SERPL-MCNC: 5.7 G/DL (ref 6.4–8.2)
RBC # BLD AUTO: 3.85 MILLION/UL (ref 3.88–5.62)
SODIUM SERPL-SCNC: 139 MMOL/L (ref 136–145)
WBC # BLD AUTO: 7.8 THOUSAND/UL (ref 4.31–10.16)

## 2021-03-07 PROCEDURE — 99222 1ST HOSP IP/OBS MODERATE 55: CPT | Performed by: ORTHOPAEDIC SURGERY

## 2021-03-07 PROCEDURE — 80053 COMPREHEN METABOLIC PANEL: CPT | Performed by: FAMILY MEDICINE

## 2021-03-07 PROCEDURE — 85025 COMPLETE CBC W/AUTO DIFF WBC: CPT | Performed by: FAMILY MEDICINE

## 2021-03-07 PROCEDURE — 99232 SBSQ HOSP IP/OBS MODERATE 35: CPT | Performed by: FAMILY MEDICINE

## 2021-03-07 RX ADMIN — SODIUM CHLORIDE 100 ML/HR: 0.9 INJECTION, SOLUTION INTRAVENOUS at 07:25

## 2021-03-07 RX ADMIN — Medication 125 MG: at 11:02

## 2021-03-07 RX ADMIN — Medication 125 MG: at 23:15

## 2021-03-07 RX ADMIN — Medication 125 MG: at 16:45

## 2021-03-07 RX ADMIN — LISINOPRIL 10 MG: 10 TABLET ORAL at 08:04

## 2021-03-07 RX ADMIN — GABAPENTIN 100 MG: 100 CAPSULE ORAL at 21:58

## 2021-03-07 RX ADMIN — OXYCODONE HYDROCHLORIDE 5 MG: 5 TABLET ORAL at 01:09

## 2021-03-07 RX ADMIN — Medication 125 MG: at 05:34

## 2021-03-07 NOTE — ASSESSMENT & PLAN NOTE
· POA fever 101 7, tachycardia 99, suspected infection-colitis  · Presented with generalized weakness and diarrhea x1 week  · History of CLL, chronic leukopenia  · Received 2 L of normal saline in the ER  · Blood culture shows no growth in 2-4 hours  · Lactate 0 8  · Condition is improving, patient is tolerating p o , and had formed stool-continue current treatment

## 2021-03-07 NOTE — PLAN OF CARE
Problem: Potential for Falls  Goal: Patient will remain free of falls  Description: INTERVENTIONS:  - Assess patient frequently for physical needs  -  Identify cognitive and physical deficits and behaviors that affect risk of falls  -  [place] fall precautions as indicated by assessment   - Educate patient/family on patient safety including physical limitations  - Instruct patient to call for assistance with activity based on assessment  - Modify environment to reduce risk of injury  - Consider OT/PT consult to assist with strengthening/mobility  Outcome: Progressing     Problem: Nutrition/Hydration-ADULT  Goal: Nutrient/Hydration intake appropriate for improving, restoring or maintaining nutritional needs  Description: Monitor and assess patient's nutrition/hydration status for malnutrition  Collaborate with interdisciplinary team and initiate plan and interventions as ordered  Monitor patient's weight and dietary intake as ordered or per policy  Utilize nutrition screening tool and intervene as necessary  Determine patient's food preferences and provide high-protein, high-caloric foods as appropriate       INTERVENTIONS:  - Monitor oral intake, urinary output, labs, and treatment plans  - Assess nutrition and hydration status and recommend course of action  - Evaluate amount of meals eaten  - Assist patient with eating if necessary   - Allow adequate time for meals  - Recommend/ encourage appropriate diets, oral nutritional supplements, and vitamin/mineral supplements  - Order, calculate, and assess calorie counts as needed  - Recommend, monitor, and adjust tube feedings and TPN/PPN based on assessed needs  - Assess need for intravenous fluids  - Provide specific nutrition/hydration education as appropriate  - Include patient/family/caregiver in decisions related to nutrition  Outcome: Progressing     Problem: PAIN - ADULT  Goal: Verbalizes/displays adequate comfort level or baseline comfort level  Description: Interventions:  - Encourage patient to monitor pain and request assistance  - Assess pain using appropriate pain scale  - Administer analgesics based on type and severity of pain and evaluate response  - Implement non-pharmacological measures as appropriate and evaluate response  - Consider cultural and social influences on pain and pain management  - Notify physician/advanced practitioner if interventions unsuccessful or patient reports new pain  Outcome: Progressing     Problem: INFECTION - ADULT  Goal: Absence or prevention of progression during hospitalization  Description: INTERVENTIONS:  - Assess and monitor for signs and symptoms of infection  - Monitor lab/diagnostic results  - Monitor all insertion sites, i- Monitor endotracheal if appropriate and nasal secretions for changes in amount and color  - [place] appropriate cooling/warming therapies per order  - Administer medications as ordered  - Instruct and encourage patient and family to use good hand hygiene technique  - Identify and instruct in appropriate isolation precautions for identified infection/condition  Outcome: Progressing  Goal: Absence of fever/infection during neutropenic period  Description: INTERVENTIONS:  - Monitor WBC    Outcome: Progressing     Problem: SAFETY ADULT  Goal: Patient will remain free of falls  Description: INTERVENTIONS:  - Assess patient frequently for physical needs  -  Identify cognitive and physical deficits and behaviors that affect risk of falls    -  [place] fall precautions as indicated by assessment   - Educate patient/family on patient safety including physical limitations  - Instruct patient to call for assistance with activity based on assessment  - Modify environment to reduce risk of injury  - Consider OT/PT consult to assist with strengthening/mobility  Outcome: Progressing  Goal: Maintain or return to baseline ADL function  Description: INTERVENTIONS:  -  Assess patient's ability to carry out ADLs; assess patient's baseline for ADL function and identify physical deficits which impact ability to perform ADLs (bathing, care of mouth/teeth, toileting, grooming, dressing, etc )  - Assess/evaluate cause of self-care deficits   - Assess range of motion  - Assess patient's mobility; develop plan if impaired  - Assess patient's need for assistive devices and provide as appropriate  - Encourage maximum independence but intervene and supervise when necessary  - Involve family in performance of ADLs  - Assess for home care needs following discharge   - Consider OT consult to assist with ADL evaluation and planning for discharge  - Provide patient education as appropriate  Outcome: Progressing  Goal: Maintain or return mobility status to optimal level  Description: INTERVENTIONS:  - Assess patient's baseline mobility status (ambulation, transfers, stairs, etc )    - Identify cognitive and physical deficits and behaviors that affect mobility  - Identify mobility aids required to assist with transfers and/or ambulation (gait belt, sit-to-stand, lift, walker, cane, etc )  - [place] fall precautions as indicated by assessment  - Record patient progress and toleration of activity level on Mobility SBAR; progress patient to next Phase/Stage  - Instruct patient to call for assistance with activity based on assessment  - Consider rehabilitation consult to assist with strengthening/weightbearing, etc   Outcome: Progressing     Problem: DISCHARGE PLANNING  Goal: Discharge to home or other facility with appropriate resources  Description: INTERVENTIONS:  - Identify barriers to discharge w/patient and caregiver  - Arrange for needed discharge resources and transportation as appropriate  - Identify discharge learning needs (meds, wound care, etc )  - Arrange for interpretive services to assist at discharge as needed  - Refer to Case Management Department for coordinating discharge planning if the patient needs post-hospital services based on physician/advanced practitioner order or complex needs related to functional status, cognitive ability, or social support system  Outcome: Progressing     Problem: Knowledge Deficit  Goal: Patient/family/caregiver demonstrates understanding of disease process, treatment plan, medications, and discharge instructions  Description: Complete learning assessment and assess knowledge base    Interventions:  - Provide teaching at level of understanding  - Provide teaching via preferred learning methods  Outcome: Progressing     Problem: GASTROINTESTINAL - ADULT  Goal: Minimal or absence of nausea and/or vomiting  Description: INTERVENTIONS:  - Administer IV fluids if ordered to ensure adequate hydration  - Maintain NPO status until nausea and vomiting are resolved  - Nasogastric tube if ordered  - Administer ordered antiemetic medications as needed  - Provide nonpharmacologic comfort measures as appropriate  - Advance diet as tolerated, if ordered  - Consider nutrition services referral to assist patient with adequate nutrition and appropriate food choices  Outcome: Progressing  Goal: Maintains or returns to baseline bowel function  Description: INTERVENTIONS:  - Assess bowel function  - Encourage oral fluids to ensure adequate hydration  - Administer IV fluids if ordered to ensure adequate hydration  - Administer ordered medications as needed  - Encourage mobilization and activity  - Consider nutritional services referral to assist patient with adequate nutrition and appropriate food choices  Outcome: Progressing  Goal: Maintains adequate nutritional intake  Description: INTERVENTIONS:  - Monitor percentage of each meal consumed  - Identify factors contributing to decreased intake, treat as appropriate  - Assist with meals as needed  - Monitor I&O, weight, and lab values if indicated  - Obtain nutrition services referral as needed  Outcome: Progressing  Goal: Establish and maintain optimal ostomy function  Description: INTERVENTIONS:  - Assess bowel function  - Encourage oral fluids to ensure adequate hydration  - Administer IV fluids if ordered to ensure adequate hydration   - Administer ordered medications as needed  - Encourage mobilization and activity  - Nutrition services referral to assist patient with appropriate food choices  - Assess stoma site  - Consider wound care consult   Outcome: Progressing

## 2021-03-07 NOTE — CONSULTS
Consultation - Orthopedics   Teo Theodore 78 y o  male MRN: [id number]  Unit/Bed#: -01 Encounter: [id number]      Assessment/Plan     Assessment:  Chronic bone lesion right proximal femur    Plan:  The patient offers no complaints of pain  He denies any weight loss  He ambulates well  After reviewing the CT, there is nothing acute regarding the above  It does not look metastatic in nature  Would encourage ambulation once medically stable  Follow up on as an outpatient as needed  History of Present Illness   Physician Requesting Consult: Ashlie Suarez MD  Reason for Consult / Principal Problem:  Right hip lesion  HPI: Teo Theodore is a 78y o  year old male who presents with a right hip lesion that was found incidentally on a CT at his time of admission  The patient was admitted for colitis  He denies any issues with his right hip  He denies any trochanteric pain  He denies any groin pain  He denies any problems with ambulation  He denies any low back pain  He denies any numbness or tingling  The patient was not aware of any previous issues with his right hip  Inpatient consult to Orthopedic Surgery  Consult performed by: Jean Claude Galan DO  Consult ordered by: Ashlie Suarez MD          Review of Systems   Constitutional: Negative for chills, fever and unexpected weight change  HENT: Negative for hearing loss, nosebleeds and sore throat  Eyes: Negative for pain, redness and visual disturbance  Respiratory: Negative for cough, shortness of breath and wheezing  Cardiovascular: Negative for chest pain, palpitations and leg swelling  Gastrointestinal: Negative for abdominal pain, nausea and vomiting  Endocrine: Negative for polydipsia and polyuria  Genitourinary: Negative for dysuria and hematuria  Musculoskeletal: Negative for arthralgias, back pain, gait problem, joint swelling, myalgias, neck pain and neck stiffness          As noted in HPI   Skin: Negative for rash and wound  Neurological: Negative for dizziness, numbness and headaches  Psychiatric/Behavioral: Negative for decreased concentration and suicidal ideas  The patient is not nervous/anxious  Historical Information   Past Medical History:   Diagnosis Date    Hypertension     Prostate cancer ([address] )      Past Surgical History:   Procedure Laterality Date    APPENDECTOMY      BACK SURGERY  2016    CATARACT EXTRACTION  2020    PROSTATE SURGERY       Social History   Social History     Substance and Sexual Activity   Alcohol Use Not Currently     Social History     Substance and Sexual Activity   Drug Use Not Currently     E-Cigarette/Vaping    E-Cigarette Use Never User      E-Cigarette/Vaping Substances     Social History     Tobacco Use   Smoking Status Former Smoker   Smokeless Tobacco Never Used   Tobacco Comment    Quit at age 23     Family History: History reviewed  No pertinent family history  Meds/Allergies   current meds:   Current Facility-Administered Medications   Medication Dose Route Frequency    gabapentin (NEURONTIN) capsule 100 mg  100 mg Oral HS    lisinopril (ZESTRIL) tablet 10 mg  10 mg Oral Daily    morphine injection 2 mg  2 mg Intravenous Q4H PRN    ondansetron (ZOFRAN) injection 4 mg  4 mg Intravenous Q6H PRN    oxyCODONE (ROXICODONE) IR tablet 5 mg  5 mg Oral Q4H PRN    vancomycin (VANCOCIN) oral solution 125 mg  125 mg Oral Q6H [address]     No Known Allergies    Objective   Vitals: Blood pressure 130/80, pulse (!) 106, temperature 99 5 °F (37 5 °C), resp  rate 20, height 5' 8" (1 727 m), weight 85 6 kg (188 lb 11 4 oz), SpO2 93 %  ,Body mass index is 28 69 kg/m²  Intake/Output Summary (Last 24 hours) at 3/7/2021 1233  Last data filed at 3/7/2021 0901  Gross per 24 hour   Intake 2071 67 ml   Output 150 ml   Net 1921 67 ml     I/O last 24 hours: In: 2311 7 [P O :240;  I V :2071 7]  Out: 150 [Urine:150]    Invasive Devices     Peripheral Intravenous Line Peripheral IV 03/04/21 Left;Ventral (anterior) Arm 2 days                Physical Exam  Ortho Exam     Physical Exam   Constitutional: Appears well-developed and well-nourished  No distress  HENT:   Head: Normocephalic  Eyes: Conjunctivae are normal  Right eye exhibits no discharge  Left eye exhibits no discharge  No scleral icterus  Cardiovascular: Normal rate  Pulmonary/Chest: Effort normal    Neurological: Alert and oriented to person, place, and time  Skin: Skin is warm and dry  No rash noted  The patient is not diaphoretic  No erythema  No pallor  Psychiatric: Normal mood and affect  Behavior is normal  Judgment and thought content normal      Spine is midline  There is no tenderness in his spine  There is a negative straight leg and a negative contralateral straight leg raising test   Right lower extremity is neurovascular intact  Toes are pink and mobile  Compartments are soft  There is full range of motion along his right hip  There is no groin pain  There is no trochanteric pain  There is no instability  Completely neurologically intact distally  No atrophy of his quadriceps or hamstring musculature  Range of motion of his knees bilaterally from 0-125 degrees  There is a negative Lachman's, drawer, pivot shift test   There is no medial or lateral instability  There is no medial lateral joint tenderness  Negative Racheal's  No atrophy along his lower leg  Remaining sensation, motor, deep tender reflexes were intact    There is a negative clonus and a negative Babinski test    Lab Results:   CBC:   Lab Results   Component Value Date    WBC 7 80 03/07/2021    HGB 12 0 03/07/2021    HCT 36 1 (L) 03/07/2021    MCV 94 03/07/2021    PLT 56 (L) 03/07/2021    MCH 31 2 03/07/2021    MCHC 33 2 03/07/2021    RDW 13 6 03/07/2021    MPV 11 3 03/07/2021    NRBC 0 03/07/2021     CMP:   Lab Results   Component Value Date    SODIUM 139 03/07/2021     03/07/2021    CO2 24 03/07/2021    BUN 23 03/07/2021    CREATININE 1 54 (H) 03/07/2021    CALCIUM 7 5 (L) 03/07/2021    AST 22 03/07/2021    ALT 25 03/07/2021    ALKPHOS 55 03/07/2021    EGFR 41 03/07/2021     PT/INR: No results found for: PT, INR  Imaging Studies: I have personally reviewed pertinent films in PACS     CT of the chest, abdomen, pelvis shows a bone lesion along the  peritrochanteric area  It does not look metastatic  It is well circumscribed  With A small nidus is is present in the middle of this region as well  EKG, Pathology, and Other Studies: I have personally reviewed pertinent reports  VTE Prophylaxis: Sequential compression device (Venodyne)     Code Status: Level 1 - Full Code  Advance Directive and Living Will:      Power of :    POLST:      Counseling / Coordination of Care  Total floor / unit time spent today 35 minutes  Greater than 50% of total time was spent with the patient and / or family counseling and / or coordination of care   A description of the counseling / coordination of care:

## 2021-03-07 NOTE — NURSING NOTE
Answered pt call light; pt stated he had pain rated 9/10 left abdomen  Stated it began after having bowel movement  This nurse retrieved prn 2 mg morphine; but when asked to verify the pain level, the pt stated it was 5/10  Morphine returned to omnicell, 5 mg oxycodone retrieved instead due to pain changed from severe to moderate  Explained this to the pt; oxycodone given  [facility] notified  Will continue to monitor

## 2021-03-07 NOTE — PROGRESS NOTES
Progress Note Sindhu Askew 1940, 78 y o  male MRN: [id number]    Unit/Bed#: -01 Encounter: [id number]    Primary Care Provider: Agustina Estrada DO   Date and time admitted to hospital: 3/4/2021  5:17 PM        C  difficile colitis  Assessment & Plan  · Diarrhea x1 week with fever 101 7  · CT abdomen pelvis:  Colitis involving the descending and sigmoid colon  · On 03/05/2021-due to patient was insisting for foot, patient was placed on clear liquid diet  Overnight patient require multiple dose of narcotics for pain  Will keep patient NPO at this time continue IV fluid  · GI consult appreciated recommends to rule out C diff and enteric panel  · Will advance the diet as tolerated-started with clear liquid  · Patient has formed stool, with no significant abdominal pain    * Sepsis without acute organ dysfunction (HCC)  Assessment & Plan  · POA fever 101 7, tachycardia 99, suspected infection-colitis  · Presented with generalized weakness and diarrhea x1 week  · History of CLL, chronic leukopenia  · Received 2 L of normal saline in the ER  · Blood culture shows no growth in 2-4 hours  · Lactate 0 8  · Condition is improving, patient is tolerating p o , and had formed stool-continue current treatment      Lesion of right femur  Assessment & Plan  · Osseous lesion in the right femur, imaging characteristics suggest a low-grade chondroid lesion, however metastasis is also differential consideration  · If prior  CT or MRI becomes available, these can be uploaded, and an addendum this report can be issued upon request   Unable to find these imaging studies in available records  · CT scan of right femur reviewed    Pending ortho recommendation    [facility]ch[facility] )  Assessment & Plan  · Follows with Hematology, last visit November 2019  · Bone marrow biopsy in 2018 with small 10% population of CLL per hematology note  · Continue outpatient follow-up    Essential hypertension  Assessment & Plan  · BP reviewed and acceptable  · Continue lisinopril  · Monitor blood pressure    Chronic leukopenia  Assessment & Plan  · WBC 5 8 today  · Daily CBC and trend WBC  · Continue outpatient Hematology follow-up    Thrombocytopenia ([address] )  Assessment & Plan  · As per daughter, patient's platelet runs around 50-60K  · Platelets 65>42>52  · Daily CBC and trend platelets  · Monitor for bleeding    CKD (chronic kidney disease)  Assessment & Plan  Lab Results   Component Value Date    EGFR 41 2021    EGFR 42 2021    EGFR 36 2021    CREATININE 1 54 (H) 2021    CREATININE 1 52 (H) 2021    CREATININE 1 70 (H) 2021     · Baseline 1 5-creatinine is almost close to baseline  · Will advance diet as tolerated    · Daily metabolic panel and trend creatinine  · Caution with nephrotoxins avoid hypotension        VTE Pharmacologic Prophylaxis:   Pharmacologic: Thrombocytopenia  Mechanical VTE Prophylaxis in Place: Yes    Patient Centered Rounds: I have performed bedside rounds with nursing staff today  Education and Discussions with Family / Patient:  Yes with patient and his daughter over the Phone    Time Spent for Care: 20 minutes  More than 50% of total time spent on counseling and coordination of care as described above  Current Length of Stay: 3 day(s)    Current Patient Status: Inpatient   Certification Statement: The patient will continue to require additional inpatient hospital stay due to Sepsis, C diff colitis    Discharge Plan / Estimated Discharge Date: To be determined      Code Status: Level 1 - Full Code      Subjective:   Seen and evaluated during the round  Patient reports he had a bowel movement, formed stool without any blood  Reports he feels little pain during the bowel movement  But tolerating well the liquid diet      Objective:     Vitals:   Temp (24hrs), Av 7 °F (37 1 °C), Min:97 6 °F (36 4 °C), Max:99 5 °F (37 5 °C)    Temp: [97 6 °F (36 4 °C)-99 5 °F (37 5 °C)] 99 5 °F (37 5 °C)  HR:  [101-107] 106  Resp:  [16-20] 20  BP: (130-132)/(80-83) 130/80  SpO2:  [92 %-93 %] 93 %  Body mass index is 28 69 kg/m²  Input and Output Summary (last 24 hours): Intake/Output Summary (Last 24 hours) at 3/7/2021 1049  Last data filed at 3/7/2021 0901  Gross per 24 hour   Intake 2071 67 ml   Output 150 ml   Net 1921 67 ml       Physical Exam:     Physical Exam  Vitals signs reviewed  Constitutional:       Appearance: He is not diaphoretic  HENT:      Mouth/Throat:      Pharynx: No oropharyngeal exudate or posterior oropharyngeal erythema  Eyes:      General: No scleral icterus  Conjunctiva/sclera: Conjunctivae normal       Pupils: Pupils are equal, round, and reactive to light  Neck:      Musculoskeletal: Normal range of motion  Cardiovascular:      Rate and Rhythm: Normal rate  Heart sounds: No gallop  Pulmonary:      Effort: Pulmonary effort is normal  No respiratory distress  Breath sounds: No stridor  No wheezing  Abdominal:      General: Abdomen is flat  Bowel sounds are normal  There is no distension  Palpations: There is no mass  Tenderness: There is no abdominal tenderness  There is no guarding or rebound  Hernia: No hernia is present  Musculoskeletal: Normal range of motion  Lymphadenopathy:      Cervical: No cervical adenopathy  Skin:     General: Skin is warm  Neurological:      General: No focal deficit present  Mental Status: He is alert and oriented to person, place, and time  Cranial Nerves: No cranial nerve deficit  Motor: No weakness           Additional Data:     Labs:    Results from last 7 days   Lab Units 03/07/21  0428   WBC Thousand/uL 7 80   HEMOGLOBIN g/dL 12 0   HEMATOCRIT % 36 1*   PLATELETS Thousands/uL 56*   NEUTROS PCT % 58   LYMPHS PCT % 20   MONOS PCT % 21*   EOS PCT % 0     Results from last 7 days   Lab Units 03/07/21  0428   POTASSIUM mmol/L 4 1 CHLORIDE mmol/L 107   CO2 mmol/L 24   BUN mg/dL 23   CREATININE mg/dL 1 54*   CALCIUM mg/dL 7 5*   ALK PHOS U/L 55   ALT U/L 25   AST U/L 22           * I Have Reviewed All Lab Data Listed Above  * Additional Pertinent Lab Tests Reviewed: All Labs Within Last 24 Hours Reviewed    Recent Cultures (last 7 days):     Results from last 7 days   Lab Units 03/05/21  1730 03/04/21  1730 03/04/21  1724   BLOOD CULTURE   --  No Growth at 48 hrs  No Growth at 48 hrs  C DIFF TOXIN B BY PCR  Positive*  --   --        Last 24 Hours Medication List:   Current Facility-Administered Medications   Medication Dose Route Frequency Provider Last Rate    gabapentin  100 mg Oral HS Lennis Zachery, CRNP      lisinopril  10 mg Oral Daily Lennis Zachery, CRNP      morphine injection  2 mg Intravenous Q4H PRN Rissa S Kel, CRNP      ondansetron  4 mg Intravenous Q6H PRN Lennis Zachery, CRNP      oxyCODONE  5 mg Oral Q4H PRN Rissa S Kel, CRNP      vancomycin  125 mg Oral Q6H [address] Rissa S Kel, CRNP          Today, Patient Was Seen By: Anabell Orourke MD    ** Please Note: Dragon 360 Dictation voice to text software may have been used in the creation of this document   **

## 2021-03-07 NOTE — ASSESSMENT & PLAN NOTE
· As per daughter, patient's platelet runs around 50-60K  · Platelets 42>16>88  · Daily CBC and trend platelets  · Monitor for bleeding

## 2021-03-07 NOTE — PLAN OF CARE
Problem: Potential for Falls  Goal: Patient will remain free of falls  Description: INTERVENTIONS:  - Assess patient frequently for physical needs  -  Identify cognitive and physical deficits and behaviors that affect risk of falls    -  [place] fall precautions as indicated by assessment   - Educate patient/family on patient safety including physical limitations  - Instruct patient to call for assistance with activity based on assessment  - Modify environment to reduce risk of injury  - Consider OT/PT consult to assist with strengthening/mobility  Outcome: Progressing

## 2021-03-07 NOTE — ASSESSMENT & PLAN NOTE
Lab Results   Component Value Date    EGFR 41 03/07/2021    EGFR 42 03/06/2021    EGFR 36 03/05/2021    CREATININE 1 54 (H) 03/07/2021    CREATININE 1 52 (H) 03/06/2021    CREATININE 1 70 (H) 03/05/2021     · Baseline 1 5-creatinine is almost close to baseline    · Will advance diet as tolerated    · Daily metabolic panel and trend creatinine  · Caution with nephrotoxins avoid hypotension

## 2021-03-07 NOTE — ASSESSMENT & PLAN NOTE
· Osseous lesion in the right femur, imaging characteristics suggest a low-grade chondroid lesion, however metastasis is also differential consideration  · If prior  CT or MRI becomes available, these can be uploaded, and an addendum this report can be issued upon request   Unable to find these imaging studies in available records  · CT scan of right femur reviewed    Pending ortho recommendation

## 2021-03-07 NOTE — ASSESSMENT & PLAN NOTE
· Diarrhea x1 week with fever 101 7  · CT abdomen pelvis:  Colitis involving the descending and sigmoid colon  · On 03/05/2021-due to patient was insisting for foot, patient was placed on clear liquid diet  Overnight patient require multiple dose of narcotics for pain  Will keep patient NPO at this time continue IV fluid  · GI consult appreciated recommends to rule out C diff and enteric panel      · Will advance the diet as tolerated-started with clear liquid  · Patient has formed stool, with no significant abdominal pain

## 2021-03-08 VITALS
BODY MASS INDEX: 28.6 KG/M2 | OXYGEN SATURATION: 95 % | DIASTOLIC BLOOD PRESSURE: 72 MMHG | RESPIRATION RATE: 19 BRPM | WEIGHT: 188.71 LBS | HEIGHT: 68 IN | HEART RATE: 80 BPM | TEMPERATURE: 98 F | SYSTOLIC BLOOD PRESSURE: 135 MMHG

## 2021-03-08 PROCEDURE — 99239 HOSP IP/OBS DSCHRG MGMT >30: CPT | Performed by: FAMILY MEDICINE

## 2021-03-08 RX ORDER — VANCOMYCIN HYDROCHLORIDE 125 MG/1
125 CAPSULE ORAL 4 TIMES DAILY
Qty: 32 CAPSULE | Refills: 0 | Status: SHIPPED | OUTPATIENT
Start: 2021-03-08 | End: 2021-03-16

## 2021-03-08 RX ADMIN — Medication 125 MG: at 06:14

## 2021-03-08 RX ADMIN — LISINOPRIL 10 MG: 10 TABLET ORAL at 07:46

## 2021-03-08 NOTE — ASSESSMENT & PLAN NOTE
· POA fever 101 7, tachycardia 99, suspected infection-colitis  · Presented with generalized weakness and diarrhea x1 week  · History of CLL, chronic leukopenia  · Secondary to C diff colitis  Patient's condition significantly improved after starting the treatment    Patient will be discharged home  · Blood culture shows no, enteric panel negative, COVID negative

## 2021-03-08 NOTE — ASSESSMENT & PLAN NOTE
· Diarrhea x1 week with fever 101 7  · CT abdomen pelvis:  Colitis involving the descending and sigmoid colon  · With current treatment, patient's condition significantly improved  Patient is having formed stool  Denies any significant abdominal pain, tolerating diet  · Plan is to discharge patient home with p o   Vancomycin to complete another 8 days

## 2021-03-08 NOTE — INCIDENTAL FINDINGS
The following findings require follow up:  Radiographic finding   Finding:  CT abdomen pelvis wo contrast: 1  Colitis involving the descending and sigmoid colon, 2  Bibasal opacities with airspace opacities in the right lower lobe, concerning for pneumonia  Recommend follow-up chest CT in 2 months to ensure resolution, 3  Osseous lesion in the right femur,  imaging characteristics suggest a low-grade chondroid lesion, however metastasis is also a differential consideration  If prior CT or MRI  become available, this can be up loaded, and an addendum to this report can,  be issued upon request   Otherwise suggest follow-up bone scan for further evaluation, The study was marked in [facility] for immediate notification  , Workstation performed: [id number]   CT lower extremity wo contrast right: 2 x 1 3 cm relatively well-circumscribed lesion in the proximal femur in the peritrochanteric region with the areas of mineralized matrix with no aggressive features of periostitis, soft tissue mass  The differential consideration could include a , low-grade chondroid lesion or a liposclerosing myxofibrous lesion  The appearance of this lesion is atypical for metastatic disease   , No additional lesion seen within the shaft of the femur  ,  If patient is not at risk for any bony metastatic disease surveillance with follow-up MRI at 6 months can be considered  Alternatively evaluation with bone scan or an MRI can be considered, Chondrocalcinosis in the hip joint and in the knee joint, The study was marked in [employer] for significant notification  , Workstation performed: [id number]   Follow up required: yes   Follow up should be done within 1 week(s)    Please notify the following clinician to assist with the follow up:   Dr Lexis Mcgowan, [id number]

## 2021-03-08 NOTE — ASSESSMENT & PLAN NOTE
· Osseous lesion in the right femur, imaging characteristics suggest a low-grade chondroid lesion, however metastasis is also differential consideration  · If prior  CT or MRI becomes available, these can be uploaded, and an addendum this report can be issued upon request   Unable to find these imaging studies in available records  · CT scan of right femur reviewed    Pending ortho recommendation  · Chronic in nature, orthopedic recommendation appreciated, recommends outpatient follow-up

## 2021-03-08 NOTE — DISCHARGE SUMMARY
Discharge- Nathan Burk 1940, 78 y o  male MRN: [id number]    Unit/Bed#: -01 Encounter: [id number]    Primary Care Provider: Ayaan Guerra DO   Date and time admitted to hospital: 3/4/2021  5:17 PM        C  difficile colitis  Assessment & Plan  · Diarrhea x1 week with fever 101 7  · CT abdomen pelvis:  Colitis involving the descending and sigmoid colon  · With current treatment, patient's condition significantly improved  Patient is having formed stool  Denies any significant abdominal pain, tolerating diet  · Plan is to discharge patient home with p o  Vancomycin to complete another 8 days    * Sepsis without acute organ dysfunction (HCC)  Assessment & Plan  · POA fever 101 7, tachycardia 99, suspected infection-colitis  · Presented with generalized weakness and diarrhea x1 week  · History of CLL, chronic leukopenia  · Secondary to C diff colitis  Patient's condition significantly improved after starting the treatment  Patient will be discharged home  · Blood culture shows no, enteric panel negative, COVID negative      Lesion of right femur  Assessment & Plan  · Osseous lesion in the right femur, imaging characteristics suggest a low-grade chondroid lesion, however metastasis is also differential consideration  · If prior  CT or MRI becomes available, these can be uploaded, and an addendum this report can be issued upon request   Unable to find these imaging studies in available records  · CT scan of right femur reviewed    Pending ortho recommendation  · Chronic in nature, orthopedic recommendation appreciated, recommends outpatient follow-up    Hypercholesterolemia  Assessment & Plan  · Last lipid panel in March of 2019 unremarkable  · Continue Crestor  · Continue outpatient follow-up    Opacity of lung on imaging study  Assessment & Plan  · Bibasilar opacities with airspace opacities in the right lower lobe concerning for pneumonia  · No respiratory distress, oxygen saturation 93% on room air  · COVID negative  · Since C diff positive, Levaquin discontinued  Patient started on vancomycin  · Follow-up chest CT in 2 months to ensure resolution  · Procalcitonin x1 elevated most likely secondary to C diff colitis        Chronic back pain  Assessment & Plan  · Continue Neurontin-dose given for bedtime-continue with patient resumes diet    CLL (chronic lymphocytic leukemia) ([facility] [address] )  Assessment & Plan  · Follows with Hematology, last visit November 2019  · Bone marrow biopsy in 2018 with small 10% population of CLL per hematology note  · Continue outpatient follow-up    Essential hypertension  Assessment & Plan  · BP reviewed and acceptable  · Continue lisinopril  · Monitor blood pressure    Chronic leukopenia  Assessment & Plan  · WBC 5 8 today  · Daily CBC and trend WBC  · Continue outpatient Hematology follow-up    Thrombocytopenia ([facility] [address] )  Assessment & Plan  · As per daughter, patient's platelet runs around 50-60K  · Remained stable    CKD (chronic kidney disease)  Assessment & Plan  Lab Results   Component Value Date    EGFR 41 03/07/2021    EGFR 42 03/06/2021    EGFR 36 03/05/2021    CREATININE 1 54 (H) 03/07/2021    CREATININE 1 52 (H) 03/06/2021    CREATININE 1 70 (H) 03/05/2021     · Baseline 1 5-creatinine is almost close to baseline            Discharging Physician / Practitioner: Iman Springer MD  PCP: Charity Redd DO  Admission Date:   Admission Orders (From admission, onward)     Ordered        03/04/21 2039  Inpatient Admission  Once                   Discharge Date: 03/08/21    Resolved Problems  Date Reviewed: 3/4/2021    None          Consultations During Hospital Stay:  · Gastroenterology  · Orthopedic    Procedures Performed:   CT lower extremity wo contrast right   Final Result by Melony Lopez MD (03/06 1211)      2 x 1 3 cm relatively well-circumscribed lesion in the proximal femur in the peritrochanteric region with the areas of mineralized matrix with no aggressive features of periostitis, soft tissue mass  The differential consideration could include a    low-grade chondroid lesion or a liposclerosing myxofibrous lesion  The appearance of this lesion is atypical for metastatic disease  No additional lesion seen within the shaft of the femur   If patient is not at risk for any bony metastatic disease surveillance with follow-up MRI at 6 months can be considered  Alternatively evaluation with bone scan or an MRI can be considered         Chondrocalcinosis in the hip joint and in the knee joint      The study was marked in EPIC for significant notification  Workstation performed: [id number]         CT abdomen pelvis wo contrast   Final Result by Joel Dunaway MD (03/04 1955)      1  Colitis involving the descending and sigmoid colon   2  Bibasal opacities with airspace opacities in the right lower lobe, concerning for pneumonia  Recommend follow-up chest CT in 2 months to ensure resolution   3  Osseous lesion in the right femur,  imaging characteristics suggest a low-grade chondroid lesion, however metastasis is also a differential consideration  If prior CT or MRI  become available, this can be up loaded, and an addendum to this report can    be issued upon request   Otherwise suggest follow-up bone scan for further evaluation      The study was marked in [facility] for immediate notification              Workstation performed: [id number]         ·   ·     Significant Findings / Test Results:   Lab Results   Component Value Date    WBC 7 80 03/07/2021    HGB 12 0 03/07/2021    HCT 36 1 (L) 03/07/2021    MCV 94 03/07/2021    PLT 56 (L) 03/07/2021   ·   Lab Results   Component Value Date    SODIUM 139 03/07/2021    K 4 1 03/07/2021     03/07/2021    CO2 24 03/07/2021    AGAP 8 03/07/2021    BUN 23 03/07/2021    CREATININE 1 54 (H) 03/07/2021    GLUC 109 03/07/2021    CALCIUM 7 5 (L) 03/07/2021    AST 22 03/07/2021    ALT 25 03/07/2021    ALKPHOS 55 03/07/2021    TP 5 7 (L) 03/07/2021    TBILI 0 49 03/07/2021    EGFR 41 03/07/2021   ·   Collected Updated Procedure Result Status Patient Facility Result Comment    03/05/2021 1730 03/06/2021 0601 Clostridium difficile toxin by PCR with EIA [[id number]]   (Abnormal)   Stool from Per Rectum    Final result [address]  Component Value   C difficile toxin by PCR PositiveAbnormal     Result suggests infection or colonization with C  difficile  EIA testing has been performed to clarify  Maintain strict contact and hand hygiene precautions  C DIFFICILE TOXINS A+ B, EIA PositiveAbnormal     Probable active C  difficile infection  Treatment recommended if symptomatic  Maintain strict contact and hand hygiene precautions  03/05/2021 1730 03/06/2021 0538 Stool Enteric Bacterial Panel by PCR [[id number]]   Stool from Rectum    Final result [address]  Component Value   Salmonella sp PCR None Detected   Shigella sp/Enteroinvasive E  coli (EIEC) PCR None Detected   Campylobacter sp (jejuni and coli) PCR None Detected   Shiga toxin 1/Shiga toxin 2 genes PCR None Detected              03/05/2021 0129 03/05/2021 0713 Occult blood 1-3, stool [[id number]]    (Abnormal)   Stool from Rectum    Final result [address] 3rd specimen collected 3/5/21 at 0650   Component Value   FECAL OCCULT BLOOD DIAGNOSTIC PositiveAbnormal     Fecal Occult Blood Diagnostic 2 PositiveAbnormal     Fecal Occult Blood Diagnostic 3 PositiveAbnormal             03/05/2021 0128 03/05/2021 1436 Stool Enteric Bacterial Panel by PCR [[id number]]   Stool from Rectum    Final result [address]  Component Value   Salmonella sp PCR None Detected   Shigella sp/Enteroinvasive E  coli (EIEC) PCR None Detected   Campylobacter sp (jejuni and coli) PCR None Detected   Shiga toxin 1/Shiga toxin 2 genes PCR None Detected              03/04/2021 1730 03/08/2021 0702 Blood culture #1 [[id number]]   Blood from Hand, Right    Preliminary result [address]  Component Value   Blood Culture No Growth at 72 hrs  P              03/04/2021 1724 03/08/2021 0702 Blood culture #2 [[id number]]   Blood from Arm, Left    Preliminary result [address]  Component Value   Blood Culture No Growth at 72 hrs  P              03/04/2021 1723 03/04/2021 1818 COVID19, Influenza A/B, RSV PCR, [facility] [[id number]]    Nares from Nasopharyngeal Swab    Final result [address]  This test has been authorized by FDA under an EUA (Emergency Use Assay) for use by authorized laboratories  [place] caution and judgement should be used with the interpretation of these results with consideration of the clinical impression and other laboratory testing   Testing reported as "Positive" or "Negative" has been proven to be accurate according to standard laboratory validation requirements   All testing is performed with control materials showing appropriate reactivity at standard intervals  Component Value   SARS-COV-2 Negative   INFLU A PCR Negative   INFLU B PCR Negative   RSV PCR Negative              03/03/2021 1108 03/04/2021 1717 NOVEL CORONAVIRUS (COVID-19), PCR [facility] [[id number]]   Specimen type and source: Sep  Final result   Component Value   SARS Coronavirus 2 PCR Not Detected                                               Method: Reverse Transcription Polymerase Chain Reaction                                              This test cannot rule out infections caused by other viral or bacterial pathogens  A Not Detected result does not rule out the presence of COVID-19 below the level of detection for this assay or the presence of inhibitors  Results should be interpreted in conjunction with clinical and epidemiological information  Re-testing should be considered in consultation with public health authorities                                                As of March 13 2020, CDC recommends collecting upper respiratory nasopharyngeal swab (NP) for initial diagnostic testing  Specimens should be collected as soon as possible once a PUI is identified, regardless of the time of symptom onset                                               The reagents for this assay have been granted Emergency Use Authorization (EUA) by the [place]  Food and Drug Administration  The performance of this assay has been verified by this laboratory, qualified under the Federal Clinical Laboratory Improvement Amendments (CLIA) to perform high complexity clinical laboratory testing                                               In order to maximize COVID19 testing capacity, [facility] is utilizing multiple platforms with comparable performance for SARS CoV-2 detection  Testing will be distributed amongst these assays depending on turnaround time and reagent availability  Specimen Description Nasopharyngeal swab   First test? Unknown   Prior test type? Not applicable   Prior test result? Not applicable   Prior test date? ^^^UNKNA^Unknown or not applicable^L^^^Unknown or not applicable   Employed in healthcare setting? No   Symptomatic as defined by CDC? Yes   Date of symptom onset? ^^^UNKNA^Unknown or not applicable^L^^^Unknown or not applicable   Currently hospitalized? No   In ICU? Unknown   Resident in congregate care setting? No   Pregnant? Not applicable        ·     Incidental Findings:   · Osseous lesion in femur     Test Results Pending at Discharge (will require follow up): · None     Outpatient Tests Requested:  · None    Complications:  None    Reason for Admission:  Generalized weakness and diarrhea    Hospital Course:     Jayla Whelan is a 78 y o  male patient who originally presented to the hospital on 3/4/2021 due to generalized weakness and diarrhea  Patient admitted on the diagnosis of sepsis secondary to C diff colitis    Initially patient started with Flagyl Levaquin, which discontinued  Stool test came back positive for C diff, enteric panel negative  Gastroenterology consulted  Patient started on p o  Vancomycin, with the treatment patient's condition significantly improved  Patient is having formed stool, no significant pain  Tolerating p o  Patient will be discharged home with p o  Vancomycin to complete another 8 days  Blood culture shows no growth, enteric panel negative, COVID negative  Lesion of right femur, evaluated by orthopedic  Chronic in nature  Recommends outpatient follow-up  Patient also has history of chronic leukopenia thrombocytopenia, remained stable  CKD remained stable  Patient will be discharged home, advised the patient to follow-up with PCP in 1 1 week  Discussed the plan of care with patient and his daughter over the phone  Please see above list of diagnoses and related plan for additional information  Condition at Discharge: stable     Discharge Day Visit / Exam:     Subjective:  Seen and evaluated during the round  Patient denies any significant complaint denies any pain, abdominal distention  Having regular bowel movement  Vitals: Blood Pressure: 135/72 (03/08/21 0702)  Pulse: 80 (03/08/21 0702)  Temperature: 98 °F (36 7 °C) (03/08/21 0702)  Temp Source: Oral (03/05/21 1512)  Respirations: 19 (03/08/21 0702)  Height: 5' 8" (172 7 cm) (03/04/21 2122)  Weight - Scale: 85 6 kg (188 lb 11 4 oz) (03/04/21 2122)  SpO2: 95 % (03/08/21 0702)  Exam:   Physical Exam  Vitals signs and nursing note reviewed  Exam conducted with a chaperone present  Constitutional:       Appearance: He is not diaphoretic  HENT:      Nose: No congestion  Mouth/Throat:      Pharynx: No oropharyngeal exudate  Eyes:      General: No scleral icterus  Conjunctiva/sclera: Conjunctivae normal       Pupils: Pupils are equal, round, and reactive to light  Neck:      Musculoskeletal: Normal range of motion  Cardiovascular:      Rate and Rhythm: Normal rate  Heart sounds: No friction rub  No gallop  Pulmonary:      Effort: Pulmonary effort is normal  No respiratory distress  Breath sounds: No wheezing or rales  Abdominal:      General: Abdomen is flat  Bowel sounds are normal  There is no distension  Palpations: There is no mass  Tenderness: There is no abdominal tenderness  There is no guarding  Hernia: No hernia is present  Musculoskeletal: Normal range of motion  Lymphadenopathy:      Cervical: No cervical adenopathy  Skin:     General: Skin is warm  Capillary Refill: Capillary refill takes less than 2 seconds  Findings: No lesion or rash  Neurological:      General: No focal deficit present  Mental Status: He is alert and oriented to person, place, and time  Cranial Nerves: No cranial nerve deficit  Sensory: No sensory deficit  Motor: No weakness  Coordination: Coordination normal    Psychiatric:         Mood and Affect: Mood normal        Discussion with Family: yes-with daughter    Discharge instructions/Information to patient and family:   See after visit summary for information provided to patient and family  Provisions for Follow-Up Care:  See after visit summary for information related to follow-up care and any pertinent home health orders  Disposition:     Home    For Discharges to [facility]:   · Not Applicable to this Patient - Not Applicable to this Patient    Planned Readmission:  If condition get worse     Discharge Statement:  I spent >35 minutes discharging the patient  This time was spent on the day of discharge  I had direct contact with the patient on the day of discharge  Greater than 50% of the total time was spent examining patient, answering all patient questions, arranging and discussing plan of care with patient as well as directly providing post-discharge instructions    Additional time then spent on discharge activities  Discharge Medications:  See after visit summary for reconciled discharge medications provided to patient and family        ** Please Note: This note has been constructed using a voice recognition system **

## 2021-03-08 NOTE — PLAN OF CARE
Problem: Potential for Falls  Goal: Patient will remain free of falls  Description: INTERVENTIONS:  - Assess patient frequently for physical needs  -  Identify cognitive and physical deficits and behaviors that affect risk of falls  -  [place] fall precautions as indicated by assessment   - Educate patient/family on patient safety including physical limitations  - Instruct patient to call for assistance with activity based on assessment  - Modify environment to reduce risk of injury  - Consider OT/PT consult to assist with strengthening/mobility  Outcome: Progressing     Problem: Nutrition/Hydration-ADULT  Goal: Nutrient/Hydration intake appropriate for improving, restoring or maintaining nutritional needs  Description: Monitor and assess patient's nutrition/hydration status for malnutrition  Collaborate with interdisciplinary team and initiate plan and interventions as ordered  Monitor patient's weight and dietary intake as ordered or per policy  Utilize nutrition screening tool and intervene as necessary  Determine patient's food preferences and provide high-protein, high-caloric foods as appropriate       INTERVENTIONS:  - Monitor oral intake, urinary output, labs, and treatment plans  - Assess nutrition and hydration status and recommend course of action  - Evaluate amount of meals eaten  - Assist patient with eating if necessary   - Allow adequate time for meals  - Recommend/ encourage appropriate diets, oral nutritional supplements, and vitamin/mineral supplements  - Order, calculate, and assess calorie counts as needed  - Recommend, monitor, and adjust tube feedings and TPN/PPN based on assessed needs  - Assess need for intravenous fluids  - Provide specific nutrition/hydration education as appropriate  - Include patient/family/caregiver in decisions related to nutrition  Outcome: Progressing     Problem: PAIN - ADULT  Goal: Verbalizes/displays adequate comfort level or baseline comfort level  Description: Interventions:  - Encourage patient to monitor pain and request assistance  - Assess pain using appropriate pain scale  - Administer analgesics based on type and severity of pain and evaluate response  - Implement non-pharmacological measures as appropriate and evaluate response  - Consider cultural and social influences on pain and pain management  - Notify physician/advanced practitioner if interventions unsuccessful or patient reports new pain  Outcome: Progressing     Problem: INFECTION - ADULT  Goal: Absence or prevention of progression during hospitalization  Description: INTERVENTIONS:  - Assess and monitor for signs and symptoms of infection  - Monitor lab/diagnostic results  - Monitor all insertion sites, i- Monitor endotracheal if appropriate and nasal secretions for changes in amount and color  - [place] appropriate cooling/warming therapies per order  - Administer medications as ordered  - Instruct and encourage patient and family to use good hand hygiene technique  - Identify and instruct in appropriate isolation precautions for identified infection/condition  Outcome: Progressing  Goal: Absence of fever/infection during neutropenic period  Description: INTERVENTIONS:  - Monitor WBC    Outcome: Progressing     Problem: SAFETY ADULT  Goal: Patient will remain free of falls  Description: INTERVENTIONS:  - Assess patient frequently for physical needs  -  Identify cognitive and physical deficits and behaviors that affect risk of falls    -  [place] fall precautions as indicated by assessment   - Educate patient/family on patient safety including physical limitations  - Instruct patient to call for assistance with activity based on assessment  - Modify environment to reduce risk of injury  - Consider OT/PT consult to assist with strengthening/mobility  Outcome: Progressing  Goal: Maintain or return to baseline ADL function  Description: INTERVENTIONS:  -  Assess patient's ability to carry out ADLs; assess patient's baseline for ADL function and identify physical deficits which impact ability to perform ADLs (bathing, care of mouth/teeth, toileting, grooming, dressing, etc )  - Assess/evaluate cause of self-care deficits   - Assess range of motion  - Assess patient's mobility; develop plan if impaired  - Assess patient's need for assistive devices and provide as appropriate  - Encourage maximum independence but intervene and supervise when necessary  - Involve family in performance of ADLs  - Assess for home care needs following discharge   - Consider OT consult to assist with ADL evaluation and planning for discharge  - Provide patient education as appropriate  Outcome: Progressing  Goal: Maintain or return mobility status to optimal level  Description: INTERVENTIONS:  - Assess patient's baseline mobility status (ambulation, transfers, stairs, etc )    - Identify cognitive and physical deficits and behaviors that affect mobility  - Identify mobility aids required to assist with transfers and/or ambulation (gait belt, sit-to-stand, lift, walker, cane, etc )  - [place] fall precautions as indicated by assessment  - Record patient progress and toleration of activity level on Mobility SBAR; progress patient to next Phase/Stage  - Instruct patient to call for assistance with activity based on assessment  - Consider rehabilitation consult to assist with strengthening/weightbearing, etc   Outcome: Progressing     Problem: DISCHARGE PLANNING  Goal: Discharge to home or other facility with appropriate resources  Description: INTERVENTIONS:  - Identify barriers to discharge w/patient and caregiver  - Arrange for needed discharge resources and transportation as appropriate  - Identify discharge learning needs (meds, wound care, etc )  - Arrange for interpretive services to assist at discharge as needed  - Refer to Case Management Department for coordinating discharge planning if the patient needs post-hospital services based on physician/advanced practitioner order or complex needs related to functional status, cognitive ability, or social support system  Outcome: Progressing     Problem: Knowledge Deficit  Goal: Patient/family/caregiver demonstrates understanding of disease process, treatment plan, medications, and discharge instructions  Description: Complete learning assessment and assess knowledge base    Interventions:  - Provide teaching at level of understanding  - Provide teaching via preferred learning methods  Outcome: Progressing     Problem: GASTROINTESTINAL - ADULT  Goal: Minimal or absence of nausea and/or vomiting  Description: INTERVENTIONS:  - Administer IV fluids if ordered to ensure adequate hydration  - Maintain NPO status until nausea and vomiting are resolved  - Nasogastric tube if ordered  - Administer ordered antiemetic medications as needed  - Provide nonpharmacologic comfort measures as appropriate  - Advance diet as tolerated, if ordered  - Consider nutrition services referral to assist patient with adequate nutrition and appropriate food choices  Outcome: Progressing  Goal: Maintains or returns to baseline bowel function  Description: INTERVENTIONS:  - Assess bowel function  - Encourage oral fluids to ensure adequate hydration  - Administer IV fluids if ordered to ensure adequate hydration  - Administer ordered medications as needed  - Encourage mobilization and activity  - Consider nutritional services referral to assist patient with adequate nutrition and appropriate food choices  Outcome: Progressing  Goal: Maintains adequate nutritional intake  Description: INTERVENTIONS:  - Monitor percentage of each meal consumed  - Identify factors contributing to decreased intake, treat as appropriate  - Assist with meals as needed  - Monitor I&O, weight, and lab values if indicated  - Obtain nutrition services referral as needed  Outcome: Progressing  Goal: Establish and maintain optimal ostomy function  Description: INTERVENTIONS:  - Assess bowel function  - Encourage oral fluids to ensure adequate hydration  - Administer IV fluids if ordered to ensure adequate hydration   - Administer ordered medications as needed  - Encourage mobilization and activity  - Nutrition services referral to assist patient with appropriate food choices  - Assess stoma site  - Consider wound care consult   Outcome: Progressing

## 2021-03-08 NOTE — PLAN OF CARE
Problem: Potential for Falls  Goal: Patient will remain free of falls  Description: INTERVENTIONS:  - Assess patient frequently for physical needs  -  Identify cognitive and physical deficits and behaviors that affect risk of falls    -  [place] fall precautions as indicated by assessment   - Educate patient/family on patient safety including physical limitations  - Instruct patient to call for assistance with activity based on assessment  - Modify environment to reduce risk of injury  - Consider OT/PT consult to assist with strengthening/mobility  Outcome: Progressing

## 2021-03-08 NOTE — DISCHARGE INSTRUCTIONS
C  Diff (Clostridioides Difficile) Infection   WHAT YOU NEED TO KNOW:   Clostridioides difficile, Clostridium difficile, or C  diff, is a bacterium that causes diarrhea, irritation, and swelling of the colon  Antibiotic use is the most common cause of CDI  The bowel movement of a person with a CDI contains C  diff  Infected people who do not wash their hands properly after having a bowel movement can spread C  diff  The bacteria can live a long time on surfaces you touch, such as the tops of tables  DISCHARGE INSTRUCTIONS:   Call your local emergency number (911 in the [address] [facility]) if:   · You have a fever and stomach cramps that get worse, or do not go away  · Your abdomen is hard or feels swollen  · You have black or bright red bowel movements  · You vomit blood  · You are short of breath, or feel like you are going to faint  · You have any of the following signs of dehydration:    ? Dizziness or weakness, or extreme sleepiness    ? Dry mouth, cracked lips, or you feel very thirsty    ? Fast heartbeat or rapid breathing    ? Very little urine or no urine    ? Sunken eyes    Call your doctor if:   · You have a fever  · Your signs and symptoms get worse  · Your signs and symptoms do not go away, or they come back, even after treatment  · You cannot eat or drink  · You have questions or concerns about your condition or care  Medicines:   · Antibiotics  may be given to keep the C  diff bacteria from growing  · Take your medicine as directed  Contact your healthcare provider if you think your medicine is not helping or if you have side effects  Tell him or her if you are allergic to any medicine  Keep a list of the medicines, vitamins, and herbs you take  Include the amounts, and when and why you take them  Bring the list or the pill bottles to follow-up visits  Carry your medicine list with you in case of an emergency  What you can do to manage or prevent a CDI:   · Wash your hands often  Wash your hands several times each day  Wash after you use the bathroom, change a child's diaper, and before you prepare or eat food  Use soap and water every time  Rub your soapy hands together, lacing your fingers  Wash the front and back of your hands, and in between your fingers  Use the fingers of one hand to scrub under the fingernails of the other hand  Wash for at least 20 seconds  Rinse with warm, running water for several seconds  Then dry your hands with a clean towel or paper towel  Use hand  that contains alcohol if soap and water are not available  Do not touch your eyes, nose, or mouth without washing your hands first          · Clean surfaces often  Clean doorknobs, countertops, cell phones, and other surfaces that are touched often  Use a disinfecting wipe, a single-use sponge, or a cloth you can wash and reuse  Use disinfecting  if you do not have wipes  You can create a disinfecting  by mixing 1 part bleach with 10 parts water  · Prevent the spread of C  diff  Do not share any items with other people  Use as many disposable items as you can, such as paper plates  Do this until your diarrhea has stopped  · Ask about probiotics  Probiotics are also called good bacteria  They can help protect you from harmful bacteria  If you develop more than one CDI, probiotics may help prevent more infections  Ask your healthcare provider if probiotics are right for you  You may be able to eat yogurt or other foods high in probiotics  Your provider may instead recommend a pill or liquid form  · Drink more liquids to prevent dehydration  You may also drink an oral rehydration solution (ORS)  An ORS has the right amounts of water, salts, and sugar needed to replace body fluids  Ask your healthcare provider where to buy ORS and how much to drink  What you need to know about correct antibiotic use:   · Take your antibiotic as directed  Do not skip a dose of your antibiotic   Do not stop taking your antibiotic, even if you feel better  Finish the entire dose of your antibiotic unless your healthcare provider tells you to stop  · Get rid of any antibiotics you did not use  Ask your healthcare provider or pharmacist how to get rid of antibiotics  Do not share your antibiotic with another person  Do not take an antibiotic from another illness without talking to your healthcare provider  · Prevent infections caused by bacteria  This will help prevent your need for an antibiotic  Ask about vaccines that you need  Wash your hands frequently to prevent the spread of infection  · Ask your healthcare provider how to manage your symptoms without antibiotics  Your healthcare provider can recommend other treatments based on your illness  An example includes over-the-counter medicines such as acetaminophen or NSAIDs  Follow up with your doctor in 1 to 2 days: You may need to have an antibiotic changed if it caused your CDI  Write down your questions so you remember to ask them during your visits  © Copyright [address] [place] [address] Drive Information is for End User's use only and may not be sold, redistributed or otherwise used for commercial purposes  All illustrations and images included in CareNotes® are the copyrighted property of A D A M , Inc  or [address] [place] [address] Tomas   The above information is an  only  It is not intended as medical advice for individual conditions or treatments  Talk to your doctor, nurse or pharmacist before following any medical regimen to see if it is safe and effective for you

## 2021-03-08 NOTE — ASSESSMENT & PLAN NOTE
Lab Results   Component Value Date    EGFR 41 03/07/2021    EGFR 42 03/06/2021    EGFR 36 03/05/2021    CREATININE 1 54 (H) 03/07/2021    CREATININE 1 52 (H) 03/06/2021    CREATININE 1 70 (H) 03/05/2021     · Baseline 1 5-creatinine is almost close to baseline

## 2021-03-09 ENCOUNTER — TELEPHONE (OUTPATIENT)
Dept: OBGYN CLINIC | Facility: HOSPITAL | Age: 81
End: 2021-03-09

## 2021-03-09 DIAGNOSIS — Z23 ENCOUNTER FOR IMMUNIZATION: ICD-10-CM

## 2021-03-09 NOTE — TELEPHONE ENCOUNTER
I called patient for [address] followup with Dr Myra Stokes  For R Femur lesion FU  Patient refuses appt states there is nothing wrong with his leg

## 2021-03-10 LAB
BACTERIA BLD CULT: NORMAL
BACTERIA BLD CULT: NORMAL

## 2021-03-10 NOTE — UTILIZATION REVIEW
Notification of Discharge  This is a Notification of Discharge from our facility [address]  Please be advised that this patient has been discharge from our facility  Below you will find the admission and discharge date and time including the patients disposition  PRESENTATION DATE: 3/4/2021  5:17 PM  OBS ADMISSION DATE:   IP ADMISSION DATE: 3/4/21 2039   DISCHARGE DATE: 3/8/2021 11:30 AM  DISPOSITION: Home/Self Care Home/Self Care   Admission Orders listed below:  Admission Orders (From admission, onward)     Ordered        03/04/21 2039  Inpatient Admission  Once                   Please contact the UR Department if additional information is required to close this patient's authorization/case  [address] [email or web address] Textbook Time Utilization Review Department  Main: [phone] x carefully listen to the prompts  All voicemails are confidential   [email or web address]  Send all requests for admission clinical reviews, approved or denied determinations and any other requests to dedicated fax number below belonging to the campus where the patient is receiving treatment   List of dedicated fax numbers:  [address] DENIALS (Administrative/Medical Necessity) [phone]   [address] (Maternity/NICU/Pediatrics) [phone]   Marium Rizvi [phone]   Philip Desai [phone]   Hazel Greencarmen Ziegler [phone]   [facility] 1525 [facility] [phone]   1101 [facility] [phone] [address] [facility] [address]  2401 [facility] 1000 [address] [facility] [phone]

## 2021-04-15 ENCOUNTER — TRANSCRIBE ORDERS (OUTPATIENT)
Dept: ADMINISTRATIVE | Facility: HOSPITAL | Age: 81
End: 2021-04-15

## 2021-04-15 DIAGNOSIS — M89.9 DISORDER OF BONE, UNSPECIFIED: ICD-10-CM

## 2021-04-15 DIAGNOSIS — J15.9 UNSPECIFIED BACTERIAL PNEUMONIA: Primary | ICD-10-CM

## 2021-04-15 DIAGNOSIS — J18.1 LOBAR PNEUMONIA, UNSPECIFIED ORGANISM (HCC): ICD-10-CM

## 2021-06-23 ENCOUNTER — APPOINTMENT (INPATIENT)
Dept: CT IMAGING | Facility: HOSPITAL | Age: 81
DRG: 872 | End: 2021-06-23
Payer: COMMERCIAL

## 2021-06-23 ENCOUNTER — HOSPITAL ENCOUNTER (INPATIENT)
Facility: HOSPITAL | Age: 81
LOS: 3 days | Discharge: HOME | DRG: 872 | End: 2021-06-26
Attending: EMERGENCY MEDICINE | Admitting: FAMILY MEDICINE
Payer: COMMERCIAL

## 2021-06-23 DIAGNOSIS — E87.1 HYPONATREMIA: ICD-10-CM

## 2021-06-23 DIAGNOSIS — A04.72 C. DIFFICILE COLITIS: ICD-10-CM

## 2021-06-23 DIAGNOSIS — R91.1 PULMONARY NODULE: ICD-10-CM

## 2021-06-23 DIAGNOSIS — R65.20 SEPSIS WITH ACUTE ORGAN DYSFUNCTION WITHOUT SEPTIC SHOCK, DUE TO UNSPECIFIED ORGANISM, UNSPECIFIED TYPE (HCC): ICD-10-CM

## 2021-06-23 DIAGNOSIS — A09 DIARRHEA OF INFECTIOUS ORIGIN: ICD-10-CM

## 2021-06-23 DIAGNOSIS — G89.29 CHRONIC BACK PAIN, UNSPECIFIED BACK LOCATION, UNSPECIFIED BACK PAIN LATERALITY: ICD-10-CM

## 2021-06-23 DIAGNOSIS — M54.9 CHRONIC BACK PAIN, UNSPECIFIED BACK LOCATION, UNSPECIFIED BACK PAIN LATERALITY: ICD-10-CM

## 2021-06-23 DIAGNOSIS — A41.9 SEPSIS WITH ACUTE ORGAN DYSFUNCTION WITHOUT SEPTIC SHOCK, DUE TO UNSPECIFIED ORGANISM, UNSPECIFIED TYPE (HCC): ICD-10-CM

## 2021-06-23 DIAGNOSIS — N17.9 AKI (ACUTE KIDNEY INJURY) (HCC): ICD-10-CM

## 2021-06-23 DIAGNOSIS — R19.7 DIARRHEA, UNSPECIFIED TYPE: Primary | ICD-10-CM

## 2021-06-23 LAB
ALBUMIN SERPL BCP-MCNC: 3.1 G/DL (ref 3.5–5)
ALP SERPL-CCNC: 55 U/L (ref 46–116)
ALT SERPL W P-5'-P-CCNC: 26 U/L (ref 12–78)
ANION GAP SERPL CALCULATED.3IONS-SCNC: 7 MMOL/L (ref 4–13)
AST SERPL W P-5'-P-CCNC: 16 U/L (ref 5–45)
BASOPHILS # BLD AUTO: 0.01 THOUSANDS/ΜL (ref 0–0.1)
BASOPHILS NFR BLD AUTO: 0 % (ref 0–1)
BILIRUB SERPL-MCNC: 0.41 MG/DL (ref 0.2–1)
BUN SERPL-MCNC: 42 MG/DL (ref 5–25)
CALCIUM ALBUM COR SERPL-MCNC: 8.7 MG/DL (ref 8.3–10.1)
CALCIUM SERPL-MCNC: 8 MG/DL (ref 8.3–10.1)
CHLORIDE SERPL-SCNC: 96 MMOL/L (ref 100–108)
CK SERPL-CCNC: 78 U/L (ref 39–308)
CO2 SERPL-SCNC: 25 MMOL/L (ref 21–32)
CREAT SERPL-MCNC: 2.48 MG/DL (ref 0.6–1.3)
EOSINOPHIL # BLD AUTO: 0 THOUSAND/ΜL (ref 0–0.61)
EOSINOPHIL NFR BLD AUTO: 0 % (ref 0–6)
ERYTHROCYTE [DISTWIDTH] IN BLOOD BY AUTOMATED COUNT: 14 % (ref 11.6–15.1)
GFR SERPL CREATININE-BSD FRML MDRD: 23 ML/MIN/1.73SQ M
GLUCOSE SERPL-MCNC: 112 MG/DL (ref 65–140)
HCT VFR BLD AUTO: 38.4 % (ref 36.5–49.3)
HGB BLD-MCNC: 12.7 G/DL (ref 12–17)
IMM GRANULOCYTES # BLD AUTO: 0.07 THOUSAND/UL (ref 0–0.2)
IMM GRANULOCYTES NFR BLD AUTO: 1 % (ref 0–2)
LACTATE SERPL-SCNC: 0.7 MMOL/L (ref 0.5–2)
LIPASE SERPL-CCNC: 212 U/L (ref 73–393)
LYMPHOCYTES # BLD AUTO: 2.37 THOUSANDS/ΜL (ref 0.6–4.47)
LYMPHOCYTES NFR BLD AUTO: 26 % (ref 14–44)
MCH RBC QN AUTO: 31.4 PG (ref 26.8–34.3)
MCHC RBC AUTO-ENTMCNC: 33.1 G/DL (ref 31.4–37.4)
MCV RBC AUTO: 95 FL (ref 82–98)
MONOCYTES # BLD AUTO: 1.79 THOUSAND/ΜL (ref 0.17–1.22)
MONOCYTES NFR BLD AUTO: 19 % (ref 4–12)
NEUTROPHILS # BLD AUTO: 5.02 THOUSANDS/ΜL (ref 1.85–7.62)
NEUTS SEG NFR BLD AUTO: 54 % (ref 43–75)
NRBC BLD AUTO-RTO: 0 /100 WBCS
PLATELET # BLD AUTO: 114 THOUSANDS/UL (ref 149–390)
PMV BLD AUTO: 11 FL (ref 8.9–12.7)
POTASSIUM SERPL-SCNC: 4.8 MMOL/L (ref 3.5–5.3)
PROT SERPL-MCNC: 6.7 G/DL (ref 6.4–8.2)
RBC # BLD AUTO: 4.05 MILLION/UL (ref 3.88–5.62)
SODIUM SERPL-SCNC: 128 MMOL/L (ref 136–145)
TROPONIN I SERPL-MCNC: <0.02 NG/ML
WBC # BLD AUTO: 9.26 THOUSAND/UL (ref 4.31–10.16)

## 2021-06-23 PROCEDURE — 87040 BLOOD CULTURE FOR BACTERIA: CPT | Performed by: FAMILY MEDICINE

## 2021-06-23 PROCEDURE — 84145 PROCALCITONIN (PCT): CPT | Performed by: FAMILY MEDICINE

## 2021-06-23 PROCEDURE — 99285 EMERGENCY DEPT VISIT HI MDM: CPT | Performed by: EMERGENCY MEDICINE

## 2021-06-23 PROCEDURE — 99223 1ST HOSP IP/OBS HIGH 75: CPT | Performed by: FAMILY MEDICINE

## 2021-06-23 PROCEDURE — 84484 ASSAY OF TROPONIN QUANT: CPT | Performed by: EMERGENCY MEDICINE

## 2021-06-23 PROCEDURE — 99285 EMERGENCY DEPT VISIT HI MDM: CPT

## 2021-06-23 PROCEDURE — 36415 COLL VENOUS BLD VENIPUNCTURE: CPT | Performed by: EMERGENCY MEDICINE

## 2021-06-23 PROCEDURE — 96360 HYDRATION IV INFUSION INIT: CPT

## 2021-06-23 PROCEDURE — 87505 NFCT AGENT DETECTION GI: CPT | Performed by: FAMILY MEDICINE

## 2021-06-23 PROCEDURE — 85025 COMPLETE CBC W/AUTO DIFF WBC: CPT | Performed by: EMERGENCY MEDICINE

## 2021-06-23 PROCEDURE — 80053 COMPREHEN METABOLIC PANEL: CPT | Performed by: EMERGENCY MEDICINE

## 2021-06-23 PROCEDURE — 87493 C DIFF AMPLIFIED PROBE: CPT | Performed by: FAMILY MEDICINE

## 2021-06-23 PROCEDURE — 93005 ELECTROCARDIOGRAM TRACING: CPT

## 2021-06-23 PROCEDURE — 83605 ASSAY OF LACTIC ACID: CPT | Performed by: FAMILY MEDICINE

## 2021-06-23 PROCEDURE — 82550 ASSAY OF CK (CPK): CPT | Performed by: EMERGENCY MEDICINE

## 2021-06-23 PROCEDURE — 83690 ASSAY OF LIPASE: CPT | Performed by: EMERGENCY MEDICINE

## 2021-06-23 PROCEDURE — G1004 CDSM NDSC: HCPCS

## 2021-06-23 PROCEDURE — 74176 CT ABD & PELVIS W/O CONTRAST: CPT

## 2021-06-23 RX ORDER — SODIUM CHLORIDE 9 MG/ML
125 INJECTION, SOLUTION INTRAVENOUS CONTINUOUS
Status: DISCONTINUED | OUTPATIENT
Start: 2021-06-23 | End: 2021-06-25

## 2021-06-23 RX ORDER — MELATONIN
1000 DAILY
Status: DISCONTINUED | OUTPATIENT
Start: 2021-06-24 | End: 2021-06-26 | Stop reason: HOSPADM

## 2021-06-23 RX ORDER — LANOLIN ALCOHOL/MO/W.PET/CERES
1 CREAM (GRAM) TOPICAL DAILY
COMMUNITY

## 2021-06-23 RX ORDER — GABAPENTIN 300 MG/1
300 CAPSULE ORAL 3 TIMES DAILY
Status: DISCONTINUED | OUTPATIENT
Start: 2021-06-23 | End: 2021-06-26 | Stop reason: HOSPADM

## 2021-06-23 RX ORDER — ATORVASTATIN CALCIUM 40 MG/1
40 TABLET, FILM COATED ORAL
Status: DISCONTINUED | OUTPATIENT
Start: 2021-06-24 | End: 2021-06-26 | Stop reason: HOSPADM

## 2021-06-23 RX ORDER — ACETAMINOPHEN 325 MG/1
650 TABLET ORAL EVERY 6 HOURS PRN
Status: DISCONTINUED | OUTPATIENT
Start: 2021-06-23 | End: 2021-06-26 | Stop reason: HOSPADM

## 2021-06-23 RX ORDER — ACETAMINOPHEN 325 MG/1
650 TABLET ORAL ONCE
Status: COMPLETED | OUTPATIENT
Start: 2021-06-23 | End: 2021-06-23

## 2021-06-23 RX ORDER — ONDANSETRON 2 MG/ML
4 INJECTION INTRAMUSCULAR; INTRAVENOUS EVERY 4 HOURS PRN
Status: DISCONTINUED | OUTPATIENT
Start: 2021-06-23 | End: 2021-06-26 | Stop reason: HOSPADM

## 2021-06-23 RX ORDER — HEPARIN SODIUM 5000 [USP'U]/ML
5000 INJECTION, SOLUTION INTRAVENOUS; SUBCUTANEOUS EVERY 8 HOURS SCHEDULED
Status: DISCONTINUED | OUTPATIENT
Start: 2021-06-23 | End: 2021-06-26 | Stop reason: HOSPADM

## 2021-06-23 RX ADMIN — ACETAMINOPHEN 650 MG: 325 TABLET ORAL at 18:51

## 2021-06-23 RX ADMIN — Medication 125 MG: at 21:43

## 2021-06-23 RX ADMIN — SODIUM CHLORIDE 1000 ML: 0.9 INJECTION, SOLUTION INTRAVENOUS at 17:49

## 2021-06-23 RX ADMIN — SODIUM CHLORIDE 125 ML/HR: 0.9 INJECTION, SOLUTION INTRAVENOUS at 20:46

## 2021-06-23 RX ADMIN — GABAPENTIN 300 MG: 300 CAPSULE ORAL at 21:43

## 2021-06-23 RX ADMIN — HEPARIN SODIUM 5000 UNITS: 5000 INJECTION INTRAVENOUS; SUBCUTANEOUS at 21:43

## 2021-06-23 NOTE — ASSESSMENT & PLAN NOTE
Most likely infectious  Patient has recent history of antibiotic use for cellulitis and diarrhea started after a started the antibiotic  Patient does have recent history of C diff colitis  Will start p o   Vancomycin  Follow is stool for C diff, enteric panel  Follow-up blood culture  Continue IV fluid

## 2021-06-23 NOTE — ASSESSMENT & PLAN NOTE
Patient was on lisinopril  Due to KATELIN on hold  In the meantime use amlodipine-once KATELIN resolved, consider to restart lisinopril and DC amlodipine

## 2021-06-23 NOTE — ASSESSMENT & PLAN NOTE
Sepsis with KATELIN  Patient mid the criteria with fever, tachycardic, tachypneic-also having diarrhea, suspected infectious in nature based on patient previous history of C diff and recent history of antibiotic use  Follow lactic acid, procalcitonin, blood culture  Follow stool for C diff, enteric panel  Continue p o   Vancomycin  Continue IV hydration

## 2021-06-23 NOTE — ASSESSMENT & PLAN NOTE
Most likely secondary to dehydration and suspected C diff colitis  Baseline creatinine is 1 58  Today creatinine is 2 48  Lisinopril on hold  Continue IV hydration  Follow renal function  Follow urinary retention protocol

## 2021-06-23 NOTE — ED PROVIDER NOTES
History  Chief Complaint   Patient presents with    Diarrhea     pt c/o diarrhea for past 10 days  pt has been taking abx per leg infection  denies travel/sob/fevers/cough     Patient is a poor historian and history is therefore limited  However he complains of generalized weakness and multiple days of diarrhea following taking antibiotics for a cellulitis of his right leg  He denies nausea or vomiting or abdominal pain  He denies fevers  He is unclear of the duration but he believes it is more than 5 days  History provided by:  Patient  History limited by: Patient is poor historian   used: No    Diarrhea  Quality:  Unable to specify  Severity:  Moderate  Onset quality:  Gradual  Timing:  Constant  Progression:  Unchanged  Relieved by:  Nothing  Worsened by:  Nothing  Associated symptoms: no abdominal pain, no arthralgias, no chills, no recent cough, no fever, no headaches, no myalgias, no URI and no vomiting    Risk factors: recent antibiotic use        Prior to Admission Medications   Prescriptions Last Dose Informant Patient Reported? Taking?    Multiple Vitamins-Minerals (CENTRUM SILVER 50+MEN PO)   Yes Yes   Sig: Take 1 tablet by mouth daily   cholecalciferol (VITAMIN D3) 1,000 units tablet   Yes Yes   Sig: Take 1,000 Units by mouth daily   gabapentin (NEURONTIN) 300 mg capsule   Yes Yes   Si mg 3 (three) times a day 300mg in am  200mg 1500   100mg hs   glucosamine-chondroitin 500-400 MG tablet   Yes Yes   Sig: Take 1 tablet by mouth daily   lisinopril (ZESTRIL) 10 mg tablet   Yes Yes   Sig: Take 15 mg by mouth daily    rosuvastatin (CRESTOR) 5 mg tablet   Yes Yes   Sig: Take 5 mg by mouth daily       Facility-Administered Medications: None       Past Medical History:   Diagnosis Date    Hypertension     Prostate cancer [facility])        Past Surgical History:   Procedure Laterality Date    APPENDECTOMY      BACK SURGERY  2016    CATARACT EXTRACTION      PROSTATE SURGERY History reviewed  No pertinent family history  I have reviewed and agree with the history as documented  E-Cigarette/Vaping    E-Cigarette Use Never User      E-Cigarette/Vaping Substances     Social History     Tobacco Use    Smoking status: Former Smoker    Smokeless tobacco: Never Used    Tobacco comment: Quit at age 23   Vaping Use    Vaping Use: Never used   Substance Use Topics    Alcohol use: Not Currently    Drug use: Not Currently       Review of Systems   Constitutional: Negative for chills and fever  HENT: Negative for ear pain, hearing loss, sore throat, trouble swallowing and voice change  Eyes: Negative for pain and discharge  Respiratory: Negative for cough, shortness of breath and wheezing  Cardiovascular: Negative for chest pain and palpitations  Gastrointestinal: Positive for diarrhea  Negative for abdominal pain, blood in stool, constipation, nausea and vomiting  Genitourinary: Negative for dysuria, flank pain, frequency and hematuria  Musculoskeletal: Negative for arthralgias, joint swelling, myalgias, neck pain and neck stiffness  Skin: Negative for rash and wound  Neurological: Negative for dizziness, seizures, syncope, facial asymmetry and headaches  Psychiatric/Behavioral: Negative for hallucinations, self-injury and suicidal ideas  All other systems reviewed and are negative  Physical Exam  Physical Exam  Vitals and nursing note reviewed  Constitutional:       General: He is not in acute distress  Appearance: He is well-developed  HENT:      Head: Normocephalic and atraumatic  Right Ear: External ear normal       Left Ear: External ear normal    Eyes:      General: No scleral icterus  Right eye: No discharge  Left eye: No discharge  Extraocular Movements: Extraocular movements intact  Conjunctiva/sclera: Conjunctivae normal    Cardiovascular:      Rate and Rhythm: Normal rate and regular rhythm        Heart sounds: Normal heart sounds  No murmur heard  Pulmonary:      Effort: Pulmonary effort is normal       Breath sounds: Normal breath sounds  No wheezing or rales  Abdominal:      General: Bowel sounds are normal  There is no distension  Palpations: Abdomen is soft  Tenderness: There is no abdominal tenderness  There is no guarding or rebound  Musculoskeletal:         General: No deformity  Normal range of motion  Cervical back: Normal range of motion and neck supple  Skin:     General: Skin is warm and dry  Findings: No rash  Neurological:      General: No focal deficit present  Mental Status: He is alert and oriented to person, place, and time  Cranial Nerves: No cranial nerve deficit  Psychiatric:         Mood and Affect: Mood normal          Behavior: Behavior normal          Thought Content:  Thought content normal          Judgment: Judgment normal          Vital Signs  ED Triage Vitals [06/23/21 1731]   Temperature Pulse Respirations Blood Pressure SpO2   99 3 °F (37 4 °C) 67 17 122/59 96 %      Temp Source Heart Rate Source Patient Position - Orthostatic VS BP Location FiO2 (%)   Temporal Monitor Lying Right arm --      Pain Score       --           Vitals:    06/23/21 1731   BP: 122/59   Pulse: 67   Patient Position - Orthostatic VS: Lying         Visual Acuity      ED Medications  Medications   sodium chloride 0 9 % bolus 1,000 mL (1,000 mL Intravenous New Bag 6/23/21 1749)       Diagnostic Studies  Results Reviewed     Procedure Component Value Units Date/Time    CBC and differential [[id number]]  (Abnormal) Collected: 06/23/21 1748    Lab Status: Final result Specimen: Blood from Arm, Left Updated: 06/23/21 1818     WBC 9 26 Thousand/uL      RBC 4 05 Million/uL      Hemoglobin 12 7 g/dL      Hematocrit 38 4 %      MCV 95 fL      MCH 31 4 pg      MCHC 33 1 g/dL      RDW 14 0 %      MPV 11 0 fL      Platelets 358 Thousands/uL      nRBC 0 /100 WBCs      Neutrophils Relative 54 %      Immat GRANS % 1 %      Lymphocytes Relative 26 %      Monocytes Relative 19 %      Eosinophils Relative 0 %      Basophils Relative 0 %      Neutrophils Absolute 5 02 Thousands/µL      Immature Grans Absolute 0 07 Thousand/uL      Lymphocytes Absolute 2 37 Thousands/µL      Monocytes Absolute 1 79 Thousand/µL      Eosinophils Absolute 0 00 Thousand/µL      Basophils Absolute 0 01 Thousands/µL     Troponin I [[id number]]  (Normal) Collected: 06/23/21 1748    Lab Status: Final result Specimen: Blood from Arm, Left Updated: 06/23/21 1817     Troponin I <0 02 ng/mL     Comprehensive metabolic panel [[id number]]  (Abnormal) Collected: 06/23/21 1748    Lab Status: Final result Specimen: Blood from Arm, Left Updated: 06/23/21 1815     Sodium 128 mmol/L      Potassium 4 8 mmol/L      Chloride 96 mmol/L      CO2 25 mmol/L      ANION GAP 7 mmol/L      BUN 42 mg/dL      Creatinine 2 48 mg/dL      Glucose 112 mg/dL      Calcium 8 0 mg/dL      Corrected Calcium 8 7 mg/dL      AST 16 U/L      ALT 26 U/L      Alkaline Phosphatase 55 U/L      Total Protein 6 7 g/dL      Albumin 3 1 g/dL      Total Bilirubin 0 41 mg/dL      eGFR 23 ml/min/1 73sq m     Narrative:      [place] guidelines for Chronic Kidney Disease (CKD):     Stage 1 with normal or high GFR (GFR > 90 mL/min/1 73 square meters)    Stage 2 Mild CKD (GFR = 60-89 mL/min/1 73 square meters)    Stage 3A Moderate CKD (GFR = 45-59 mL/min/1 73 square meters)    Stage 3B Moderate CKD (GFR = 30-44 mL/min/1 73 square meters)    Stage 4 Severe CKD (GFR = 15-29 mL/min/1 73 square meters)    Stage 5 End Stage CKD (GFR <15 mL/min/1 73 square meters)  Note: GFR calculation is accurate only with a steady state creatinine    Lipase [[id number]]  (Normal) Collected: 06/23/21 1748    Lab Status: Final result Specimen: Blood from Arm, Left Updated: 06/23/21 1815     Lipase 212 u/L     CK [[id number]]  (Normal) Collected: 06/23/21 1748    Lab Status: Final result Specimen: Blood from Arm, Left Updated: 06/23/21 1814     Total CK 78 U/L     Clostridium difficile toxin by PCR with EIA [[id number]]     Lab Status: No result Specimen: Stool                  No orders to display              Procedures  Procedures         ED Course                                           MDM  Number of Diagnoses or Management Options  Diagnosis management comments: Patient recently completed course of antibiotics, diarrhea likely due to C diff colitis  Patient has new KATELIN  New hyponatremia  Appropriate for admission  Amount and/or Complexity of Data Reviewed  Clinical lab tests: ordered and reviewed  Decide to obtain previous medical records or to obtain history from someone other than the patient: yes  Review and summarize past medical records: yes  Independent visualization of images, tracings, or specimens: yes        Disposition  Final diagnoses:   Diarrhea, unspecified type   KATELIN (acute kidney injury) (RUST 75 )   Hyponatremia     Time reflects when diagnosis was documented in both MDM as applicable and the Disposition within this note     Time User Action Codes Description Comment    6/23/2021  6:31 PM Juanita Hurley Add [R19 7] Diarrhea, unspecified type     6/23/2021  6:31 PM Bharat Hurley Add [N17 9] KATELIN (acute kidney injury) (RUST 75 )     6/23/2021  6:31 PM Belkis Sor Add [E87 1] Hyponatremia       ED Disposition     ED Disposition Condition Date/Time Comment    Admit Stable Wed Jun 23, 2021  6:31 PM Case was discussed with Dr James Connors and the patient's admission status was agreed to be Admission Status: inpatient status to the service of Dr James Connors          Follow-up Information    None         Patient's Medications   Discharge Prescriptions    No medications on file     No discharge procedures on file      PDMP Review       Value Time User    PDMP Reviewed  Yes 3/8/2021 10:07 AM Angel Holt MD          ED Provider  Electronically Signed by Humberto Leyva MD  06/23/21 9176

## 2021-06-23 NOTE — ASSESSMENT & PLAN NOTE
Most likely hypovolemic hyponatremia secondary to diarrhea  Sodium level is 128  Continue IV hydration  Continue to monitor

## 2021-06-23 NOTE — ED NOTES
Discussed with patients wife carl  She states that he was placed on antibiotics (keflex) 2 weeks ago for right leg injury from jewelry box  He was on this antibiotic for 2 days and then started with diarrhea  He was then switched to bactrim by [facility]  States he has had diarrhea for 1 5 weeks        Luz White RN  06/23/21 1713

## 2021-06-23 NOTE — H&P
[address] By[address]  H&P- [place] 1940, 78 y o  male MRN: [id number]  Unit/Bed#: -01 Encounter: [id number]  Primary Care Provider: Mayela Day DO   Date and time admitted to hospital: 6/23/2021  5:30 PM    KATELIN (acute kidney injury) [facility])  Assessment & Plan  Most likely secondary to dehydration and suspected C diff colitis  Baseline creatinine is 1 58  Today creatinine is 2 48  Lisinopril on hold  Continue IV hydration  Follow renal function  Follow urinary retention protocol    * Sepsis with acute organ dysfunction [facility])  Assessment & Plan  Sepsis with KATELIN  Patient mid the criteria with fever, tachycardic, tachypneic-also having diarrhea, suspected infectious in nature based on patient previous history of C diff and recent history of antibiotic use  Follow lactic acid, procalcitonin, blood culture  Follow stool for C diff, enteric panel  Continue p o  Vancomycin  Continue IV hydration    Hyponatremia  Assessment & Plan  Most likely hypovolemic hyponatremia secondary to diarrhea  Sodium level is 128  Continue IV hydration  Continue to monitor    Diarrhea of infectious origin  Assessment & Plan  Most likely infectious  Patient has recent history of antibiotic use for cellulitis and diarrhea started after a started the antibiotic  Patient does have recent history of C diff colitis  Will start p o   Vancomycin  Follow is stool for C diff, enteric panel  Follow-up blood culture  Continue IV fluid    CLL (chronic lymphocytic leukemia) ([facility] [address] )  Assessment & Plan  Patient follows with oncologist    Essential hypertension  Assessment & Plan  Patient was on lisinopril  Due to KATELIN on hold  In the meantime use amlodipine-once KATELIN resolved, consider to restart lisinopril and DC amlodipine      VTE Prophylaxis: Heparin  / sequential compression device   Code Status:  Full code  Discussion with family:  Wife on the bedside    Anticipated Length of Stay:  Patient will be admitted on an Inpatient basis with an anticipated length of stay of  > 2 midnights  Justification for Hospital Stay:  To monitor above condition    Total Time for Visit, including Counseling / Coordination of Care: 45 minutes  Greater than 50% of this total time spent on direct patient counseling and coordination of care  Chief Complaint:   Diarrhea, weakness    History of Present Illness:    Zev Uriostegui is a 78 y o  male who presents with diarrhea, weakness  As per patient and wife, patient was recently taking antibiotic due to cellulitis  While patient is on antibiotic, start having diarrhea, initially some multiple time, slowly S improved regarding frequency but is still having diarrhea  Patient also feels jittery and weak  Denies any distension, sore throat, joint pain, any problem with urination  Review of Systems:    Review of Systems   Constitutional: Positive for activity change, chills, fatigue and fever  Negative for appetite change and unexpected weight change  HENT: Negative for congestion, dental problem, rhinorrhea, tinnitus and trouble swallowing  Respiratory: Negative for apnea, cough, chest tightness, shortness of breath and wheezing  Cardiovascular: Negative for chest pain and leg swelling  Gastrointestinal: Positive for diarrhea  Negative for abdominal distention, abdominal pain, blood in stool, constipation, nausea, rectal pain and vomiting  Genitourinary: Negative for difficulty urinating, dysuria, enuresis and flank pain  Musculoskeletal: Negative for arthralgias and back pain  Skin: Negative for color change and pallor  Neurological: Positive for dizziness  Negative for tremors, syncope, facial asymmetry, speech difficulty, weakness, light-headedness and numbness  Hematological: Negative for adenopathy  Psychiatric/Behavioral: Negative for agitation and behavioral problems  All other systems reviewed and are negative        Past Medical and Surgical History:     Past Medical History:   Diagnosis Date    Hypertension     Prostate cancer [facility])        Past Surgical History:   Procedure Laterality Date    APPENDECTOMY      BACK SURGERY  2016    CATARACT EXTRACTION  2020    PROSTATE SURGERY         Meds/Allergies:    Prior to Admission medications    Medication Sig Start Date End Date Taking? Authorizing Provider   cholecalciferol (VITAMIN D3) 1,000 units tablet Take 1,000 Units by mouth daily   Yes Historical Provider, MD   gabapentin (NEURONTIN) 300 mg capsule 300 mg 3 (three) times a day 300mg in am  200mg 1500   100mg hs 1/24/20  Yes Historical Provider, MD   glucosamine-chondroitin 500-400 MG tablet Take 1 tablet by mouth daily   Yes Historical Provider, MD   lisinopril (ZESTRIL) 10 mg tablet Take 15 mg by mouth daily  1/22/20  Yes Historical Provider, MD   Multiple Vitamins-Minerals (CENTRUM SILVER 50+MEN PO) Take 1 tablet by mouth daily   Yes Historical Provider, MD   rosuvastatin (CRESTOR) 5 mg tablet Take 5 mg by mouth daily  1/21/20  Yes Historical Provider, MD     I have reviewed home medications with patient personally  Allergies:    Allergies   Allergen Reactions    Cephalexin Dizziness       Social History:     Marital Status: /Civil Union   Occupation:  Retired  Patient Pre-hospital Living Situation:  At home  Patient Pre-hospital Level of Mobility:  Independent  Patient Pre-hospital Diet Restrictions:  Cardiac diet  Substance Use History:   Social History     Substance and Sexual Activity   Alcohol Use Not Currently     Social History     Tobacco Use   Smoking Status Former Smoker   Smokeless Tobacco Never Used   Tobacco Comment    Quit at age 23     Social History     Substance and Sexual Activity   Drug Use Not Currently       Family History:    non-contributory    Physical Exam:     Vitals:   Blood Pressure: 125/73 (06/23/21 1910)  Pulse: 92 (06/23/21 1910)  Temperature: 99 7 °F (37 6 °C) (06/23/21 1910)  Temp Source: Oral (06/23/21 1910)  Respirations: 20 (06/23/21 1910)  Height: 5' 8" (172 7 cm) (06/23/21 1731)  Weight - Scale: 84 8 kg (186 lb 15 2 oz) (06/23/21 1731)  SpO2: 97 % (06/23/21 1841)    Physical Exam  Vitals and nursing note reviewed  Exam conducted with a chaperone present  Constitutional:       Appearance: He is not diaphoretic  HENT:      Nose: No congestion or rhinorrhea  Mouth/Throat:      Mouth: Mucous membranes are moist       Pharynx: No oropharyngeal exudate  Eyes:      General: No scleral icterus  Conjunctiva/sclera: Conjunctivae normal       Pupils: Pupils are equal, round, and reactive to light  Cardiovascular:      Rate and Rhythm: Tachycardia present  Heart sounds: No murmur heard  No friction rub  No gallop  Pulmonary:      Effort: Pulmonary effort is normal  No respiratory distress  Breath sounds: No stridor  No wheezing or rhonchi  Abdominal:      General: Abdomen is flat  Bowel sounds are normal  There is no distension  Palpations: Abdomen is soft  There is no mass  Tenderness: There is no abdominal tenderness  There is no guarding  Hernia: No hernia is present  Musculoskeletal:         General: Normal range of motion  Cervical back: Normal range of motion  Right lower leg: No edema  Left lower leg: No edema  Lymphadenopathy:      Cervical: No cervical adenopathy  Skin:     General: Skin is warm  Capillary Refill: Capillary refill takes less than 2 seconds  Findings: No lesion or rash  Neurological:      General: No focal deficit present  Mental Status: He is alert and oriented to person, place, and time  Cranial Nerves: No cranial nerve deficit  Sensory: No sensory deficit  Coordination: Coordination normal    Psychiatric:         Mood and Affect: Mood normal          Additional Data:     Lab Results: I have personally reviewed pertinent reports        Results from last 7 days   Lab Units 06/23/21  1748   WBC Thousand/uL 9 26 HEMOGLOBIN g/dL 12 7   HEMATOCRIT % 38 4   PLATELETS Thousands/uL 114*   NEUTROS PCT % 54   LYMPHS PCT % 26   MONOS PCT % 19*   EOS PCT % 0     Results from last 7 days   Lab Units 06/23/21  1748   SODIUM mmol/L 128*   POTASSIUM mmol/L 4 8   CHLORIDE mmol/L 96*   CO2 mmol/L 25   BUN mg/dL 42*   CREATININE mg/dL 2 48*   ANION GAP mmol/L 7   CALCIUM mg/dL 8 0*   ALBUMIN g/dL 3 1*   TOTAL BILIRUBIN mg/dL 0 41   ALK PHOS U/L 55   ALT U/L 26   AST U/L 16   GLUCOSE RANDOM mg/dL 112                       Imaging: Imaging orderd      EKG, Pathology, and Other Studies Reviewed on Admission:   · EKG: reviewed    [facility] Yes     ** Please Note: This note has been constructed using a voice recognition system   **

## 2021-06-24 PROBLEM — R93.5 ABNORMAL CT OF THE ABDOMEN: Status: ACTIVE | Noted: 2021-06-24

## 2021-06-24 PROBLEM — R91.1 PULMONARY NODULE: Status: ACTIVE | Noted: 2021-06-24

## 2021-06-24 LAB
ALBUMIN SERPL BCP-MCNC: 2.4 G/DL (ref 3.5–5)
ALP SERPL-CCNC: 50 U/L (ref 46–116)
ALT SERPL W P-5'-P-CCNC: 23 U/L (ref 12–78)
ANION GAP SERPL CALCULATED.3IONS-SCNC: 9 MMOL/L (ref 4–13)
AST SERPL W P-5'-P-CCNC: 15 U/L (ref 5–45)
BASOPHILS # BLD AUTO: 0.01 THOUSANDS/ΜL (ref 0–0.1)
BASOPHILS NFR BLD AUTO: 0 % (ref 0–1)
BILIRUB SERPL-MCNC: 0.37 MG/DL (ref 0.2–1)
BUN SERPL-MCNC: 35 MG/DL (ref 5–25)
C DIFF TOX A+B STL QL IA: NEGATIVE
C DIFF TOX B TCDB STL QL NAA+PROBE: POSITIVE
CALCIUM ALBUM COR SERPL-MCNC: 8.7 MG/DL (ref 8.3–10.1)
CALCIUM SERPL-MCNC: 7.4 MG/DL (ref 8.3–10.1)
CAMPYLOBACTER DNA SPEC NAA+PROBE: NORMAL
CHLORIDE SERPL-SCNC: 104 MMOL/L (ref 100–108)
CO2 SERPL-SCNC: 22 MMOL/L (ref 21–32)
CREAT SERPL-MCNC: 1.88 MG/DL (ref 0.6–1.3)
EOSINOPHIL # BLD AUTO: 0.01 THOUSAND/ΜL (ref 0–0.61)
EOSINOPHIL NFR BLD AUTO: 0 % (ref 0–6)
ERYTHROCYTE [DISTWIDTH] IN BLOOD BY AUTOMATED COUNT: 14 % (ref 11.6–15.1)
GFR SERPL CREATININE-BSD FRML MDRD: 32 ML/MIN/1.73SQ M
GLUCOSE SERPL-MCNC: 104 MG/DL (ref 65–140)
HCT VFR BLD AUTO: 33 % (ref 36.5–49.3)
HGB BLD-MCNC: 11.2 G/DL (ref 12–17)
IMM GRANULOCYTES # BLD AUTO: 0.06 THOUSAND/UL (ref 0–0.2)
IMM GRANULOCYTES NFR BLD AUTO: 1 % (ref 0–2)
LYMPHOCYTES # BLD AUTO: 1.89 THOUSANDS/ΜL (ref 0.6–4.47)
LYMPHOCYTES NFR BLD AUTO: 24 % (ref 14–44)
MCH RBC QN AUTO: 31.8 PG (ref 26.8–34.3)
MCHC RBC AUTO-ENTMCNC: 33.9 G/DL (ref 31.4–37.4)
MCV RBC AUTO: 94 FL (ref 82–98)
MONOCYTES # BLD AUTO: 1.64 THOUSAND/ΜL (ref 0.17–1.22)
MONOCYTES NFR BLD AUTO: 21 % (ref 4–12)
NEUTROPHILS # BLD AUTO: 4.33 THOUSANDS/ΜL (ref 1.85–7.62)
NEUTS SEG NFR BLD AUTO: 54 % (ref 43–75)
NRBC BLD AUTO-RTO: 0 /100 WBCS
PLATELET # BLD AUTO: 95 THOUSANDS/UL (ref 149–390)
PMV BLD AUTO: 10.5 FL (ref 8.9–12.7)
POTASSIUM SERPL-SCNC: 4.4 MMOL/L (ref 3.5–5.3)
PROCALCITONIN SERPL-MCNC: 0.08 NG/ML
PROT SERPL-MCNC: 5.4 G/DL (ref 6.4–8.2)
RBC # BLD AUTO: 3.52 MILLION/UL (ref 3.88–5.62)
SALMONELLA DNA SPEC QL NAA+PROBE: NORMAL
SHIGA TOXIN STX GENE SPEC NAA+PROBE: NORMAL
SHIGELLA DNA SPEC QL NAA+PROBE: NORMAL
SODIUM SERPL-SCNC: 135 MMOL/L (ref 136–145)
WBC # BLD AUTO: 7.94 THOUSAND/UL (ref 4.31–10.16)

## 2021-06-24 PROCEDURE — 99233 SBSQ HOSP IP/OBS HIGH 50: CPT | Performed by: FAMILY MEDICINE

## 2021-06-24 PROCEDURE — NC001 PR NO CHARGE

## 2021-06-24 PROCEDURE — 85025 COMPLETE CBC W/AUTO DIFF WBC: CPT | Performed by: FAMILY MEDICINE

## 2021-06-24 PROCEDURE — 80053 COMPREHEN METABOLIC PANEL: CPT | Performed by: FAMILY MEDICINE

## 2021-06-24 PROCEDURE — NC001 PR NO CHARGE: Performed by: INTERNAL MEDICINE

## 2021-06-24 RX ORDER — ACETAMINOPHEN 650 MG/1
325 SUPPOSITORY RECTAL EVERY 4 HOURS PRN
Status: DISCONTINUED | OUTPATIENT
Start: 2021-06-24 | End: 2021-06-24

## 2021-06-24 RX ADMIN — Medication 125 MG: at 05:33

## 2021-06-24 RX ADMIN — GABAPENTIN 300 MG: 300 CAPSULE ORAL at 15:34

## 2021-06-24 RX ADMIN — SODIUM CHLORIDE 125 ML/HR: 0.9 INJECTION, SOLUTION INTRAVENOUS at 15:34

## 2021-06-24 RX ADMIN — GABAPENTIN 300 MG: 300 CAPSULE ORAL at 10:04

## 2021-06-24 RX ADMIN — SODIUM CHLORIDE 125 ML/HR: 0.9 INJECTION, SOLUTION INTRAVENOUS at 23:58

## 2021-06-24 RX ADMIN — METRONIDAZOLE 500 MG: 500 INJECTION, SOLUTION INTRAVENOUS at 11:01

## 2021-06-24 RX ADMIN — Medication 500 MG: at 12:36

## 2021-06-24 RX ADMIN — Medication 1000 UNITS: at 10:04

## 2021-06-24 RX ADMIN — HEPARIN SODIUM 5000 UNITS: 5000 INJECTION INTRAVENOUS; SUBCUTANEOUS at 05:33

## 2021-06-24 RX ADMIN — GABAPENTIN 300 MG: 300 CAPSULE ORAL at 22:20

## 2021-06-24 RX ADMIN — Medication 500 MG: at 23:59

## 2021-06-24 RX ADMIN — HEPARIN SODIUM 5000 UNITS: 5000 INJECTION INTRAVENOUS; SUBCUTANEOUS at 22:21

## 2021-06-24 RX ADMIN — METRONIDAZOLE 500 MG: 500 INJECTION, SOLUTION INTRAVENOUS at 18:44

## 2021-06-24 RX ADMIN — Medication 125 MG: at 00:25

## 2021-06-24 RX ADMIN — HEPARIN SODIUM 5000 UNITS: 5000 INJECTION INTRAVENOUS; SUBCUTANEOUS at 15:34

## 2021-06-24 RX ADMIN — Medication 500 MG: at 18:43

## 2021-06-24 RX ADMIN — ATORVASTATIN CALCIUM 40 MG: 40 TABLET, FILM COATED ORAL at 15:34

## 2021-06-24 NOTE — PROGRESS NOTES
[address]  Progress Note Carol [place] 1940, 78 y o  male MRN: [id number]  Unit/Bed#: -Abril Encounter: [id number]  Primary Care Provider: Flavia Gilford, DO   Date and time admitted to hospital: 6/23/2021  5:30 PM    KATELIN (acute kidney injury) [facility])  Assessment & Plan  Most likely secondary to dehydration and suspected C diff colitis  Baseline creatinine is 1 58   on admission creatinine is 2 48,  With the treatment, condition improving  Lisinopril on hold  Continue IV hydration  Follow renal function  Follow urinary retention protocol    * Sepsis with acute organ dysfunction [facility])  Assessment & Plan  Sepsis with KATELIN  Patient   Mid the criteria with fever, tachycardic, tachypneic-also having diarrhea, suspected infectious in nature based on patient previous history of C diff and recent history of antibiotic use  Follow lactic acid, procalcitonin,    pendingblood culture  Follow stool for C diff, enteric panel  Continue p o  Vancomycin,  IV Flagyl  Continue IV hydration   appreciate ID recommendation    Abnormal CT of the abdomen  Assessment & Plan  The colon demonstrates mural thickening and loss of haustration throughout its length; there is dilatation of the cecum and proximal ascending colon  These findings are consistent with the history of C  difficile colitis  Findings in the cecum and   proximal ascending colon may represent early/developing toxic megacolon; maximum diameter measures 8 x 5 cm  Teri Moreno general surgery consulted, recommends conservative management  Clinically patient does not look toxic- general surgery agrees with clear liquid diet and p o  medication      Hyponatremia  Assessment & Plan  Most likely hypovolemic hyponatremia secondary to diarrhea   on admissionSodium level is 128,  Today sodium level is 135  Continue IV hydration  Continue to monitor    Diarrhea of infectious origin  Assessment & Plan  Most likely infectious  Patient has recent history of antibiotic use for cellulitis and diarrhea started after a started the antibiotic  Patient does have recent history of C diff colitis  Will start p o  Vancomycin, added IV  Flagyl as per ID recommendation  Follow is stool for C diff, enteric panel  Follow-up blood culture  Continue IV fluid    Pulmonary nodule  Assessment & Plan  6 x 4 mm nodule in the right middle lobe (2:3)  4 mm nodule right middle lobe (2:3)  Right lower lobe 6 x 7 mm nodule (2:2)  These are similar to the prior exam from 3/4/2021     patient will need outpatient follow-up    CLL (chronic lymphocytic leukemia) [facility])  Assessment & Plan  Patient follows with oncologist    Essential hypertension  Assessment & Plan  Patient was on lisinopril  Due to KATELIN on hold  In the meantime use amlodipine-once KATELIN resolved, consider to restart lisinopril and DC amlodipine    Thrombocytopenia (HCC)  Assessment & Plan  Platelet count dropped to 95 K, no active bleeding noted  Will continue to monitor      VTE Pharmacologic Prophylaxis:   Pharmacologic: Heparin  Mechanical VTE Prophylaxis in Place: Yes    Patient Centered Rounds: I have performed bedside rounds with nursing staff today  Discussions with Specialists or Other Care Team Provider:  General surgery, Infectious Disease    Education and Discussions with Family / Patient:  Yes with patient and her daughter    Time Spent for Care: 15 minutes  More than 50% of total time spent on counseling and coordination of care as described above  Current Length of Stay: 1 day(s)    Current Patient Status: Inpatient   Certification Statement: The patient will continue to require additional inpatient hospital stay due to Sepsis, suspected infectious diarrhea    Discharge Plan / Estimated Discharge Date: To be determined      Code Status: Level 1 - Full Code      Subjective:    seen and evaluated during the round  Patient resting comfortably  Denies any nausea, vomiting, abdominal pain    But patient has multiple episode of diarrhea and fever  Objective:     Vitals:   Temp (24hrs), Av 6 °F (37 6 °C), Min:97 2 °F (36 2 °C), Max:101 1 °F (38 4 °C)    Temp:  [97 2 °F (36 2 °C)-101 1 °F (38 4 °C)] 101 1 °F (38 4 °C)  HR:  [67-97] 92  Resp:  [17-20] 17  BP: ()/(49-73) 115/61  SpO2:  [93 %-97 %] 93 %  Body mass index is 27 56 kg/m²  Input and Output Summary (last 24 hours): Intake/Output Summary (Last 24 hours) at 2021 1538  Last data filed at 2021 1534  Gross per 24 hour   Intake 1986 42 ml   Output 1340 ml   Net 646 42 ml       Physical Exam:     Physical Exam  Vitals and nursing note reviewed  Exam conducted with a chaperone present  Constitutional:       Appearance: He is not diaphoretic  HENT:      Right Ear: There is no impacted cerumen  Nose: No congestion  Mouth/Throat:      Mouth: Mucous membranes are moist       Pharynx: No oropharyngeal exudate  Eyes:      General: No scleral icterus  Conjunctiva/sclera: Conjunctivae normal       Pupils: Pupils are equal, round, and reactive to light  Cardiovascular:      Rate and Rhythm: Normal rate  Heart sounds: No friction rub  No gallop  Pulmonary:      Effort: Pulmonary effort is normal  No respiratory distress  Breath sounds: No stridor  No wheezing or rhonchi  Abdominal:      General: Abdomen is flat  Bowel sounds are normal  There is no distension  Palpations: There is no mass  Tenderness: There is no abdominal tenderness  There is no guarding or rebound  Hernia: No hernia is present  Musculoskeletal:      Right lower leg: No edema  Left lower leg: No edema  Skin:     General: Skin is warm  Capillary Refill: Capillary refill takes less than 2 seconds  Findings: No lesion  Neurological:      General: No focal deficit present  Mental Status: He is alert and oriented to person, place, and time  Cranial Nerves: No cranial nerve deficit        Sensory: No sensory deficit  Motor: No weakness  Coordination: Coordination normal    Psychiatric:         Mood and Affect: Mood normal          Additional Data:     Labs:    Results from last 7 days   Lab Units 06/24/21  0538   WBC Thousand/uL 7 94   HEMOGLOBIN g/dL 11 2*   HEMATOCRIT % 33 0*   PLATELETS Thousands/uL 95*   NEUTROS PCT % 54   LYMPHS PCT % 24   MONOS PCT % 21*   EOS PCT % 0     Results from last 7 days   Lab Units 06/24/21  0538   POTASSIUM mmol/L 4 4   CHLORIDE mmol/L 104   CO2 mmol/L 22   BUN mg/dL 35*   CREATININE mg/dL 1 88*   CALCIUM mg/dL 7 4*   ALK PHOS U/L 50   ALT U/L 23   AST U/L 15           * I Have Reviewed All Lab Data Listed Above  * Additional Pertinent Lab Tests Reviewed: All Labs Within Last 24 Hours Reviewed      Recent Cultures (last 7 days):     Results from last 7 days   Lab Units 06/23/21  2018   BLOOD CULTURE  Received in Microbiology Lab  Culture in Progress  Received in Microbiology Lab  Culture in Progress  Last 24 Hours Medication List:   Current Facility-Administered Medications   Medication Dose Route Frequency Provider Last Rate    acetaminophen  650 mg Oral Q6H PRN Taylor Singh MD      atorvastatin  40 mg Oral Daily With Srikanth Barber MD      cholecalciferol  1,000 Units Oral Daily Taylor Singh MD      gabapentin  300 mg Oral TID Taylor Singh MD      heparin (porcine)  5,000 Units Subcutaneous [facility] Taylor Singh MD      metroNIDAZOLE  500 mg Intravenous Sirena Mehta MD Stopped (06/24/21 1235)    ondansetron  4 mg Intravenous Q4H PRN Taylor Singh MD      sodium chloride  125 mL/hr Intravenous Continuous Taylor Singh  mL/hr (06/24/21 1534)    vancomycin  500 mg Oral Q6H Cristy Hopkins MD          Today, Patient Was Seen By: Taylor Singh MD    ** Please Note: Dragon 360 Dictation voice to text software may have been used in the creation of this document   **

## 2021-06-24 NOTE — ASSESSMENT & PLAN NOTE
Most likely infectious  Patient has recent history of antibiotic use for cellulitis and diarrhea started after a started the antibiotic  Patient does have recent history of C diff colitis  Will start p o   Vancomycin, added IV  Flagyl as per ID recommendation  Follow is stool for C diff, enteric panel  Follow-up blood culture  Continue IV fluid

## 2021-06-24 NOTE — PLAN OF CARE
Problem: Potential for Falls  Goal: Patient will remain free of falls  Description: INTERVENTIONS:  - Educate patient/family on patient safety including physical limitations  - Instruct patient to call for assistance with activity   - Consult OT/PT to assist with strengthening/mobility   - Keep Call bell within reach  - Keep bed low and locked with side rails adjusted as appropriate  - Keep care items and personal belongings within reach  - Initiate and maintain comfort rounds  - Make Fall Risk Sign visible to staff  - Offer Toileting every 2 Hours, in advance of need  - Initiate/Maintain bed alarm  - Obtain necessary fall risk management equipment:   - Apply yellow socks and bracelet for high fall risk patients  - Consider moving patient to room near nurses station  Outcome: Progressing     Problem: PAIN - ADULT  Goal: Verbalizes/displays adequate comfort level or baseline comfort level  Description: Interventions:  - Encourage patient to monitor pain and request assistance  - Assess pain using appropriate pain scale  - Administer analgesics based on type and severity of pain and evaluate response  - Implement non-pharmacological measures as appropriate and evaluate response  - Consider cultural and social influences on pain and pain management  - Notify physician/advanced practitioner if interventions unsuccessful or patient reports new pain  Outcome: Progressing     Problem: SAFETY ADULT  Goal: Patient will remain free of falls  Description: INTERVENTIONS:  - Educate patient/family on patient safety including physical limitations  - Instruct patient to call for assistance with activity   - Consult OT/PT to assist with strengthening/mobility   - Keep Call bell within reach  - Keep bed low and locked with side rails adjusted as appropriate  - Keep care items and personal belongings within reach  - Initiate and maintain comfort rounds  - Make Fall Risk Sign visible to staff  - Offer Toileting every 2 Hours, in advance of need  - Initiate/Maintain bed   alarm  - Obtain necessary fall risk management equipment:   - Apply yellow socks and bracelet for high fall risk patients  - Consider moving patient to room near nurses station  Outcome: Progressing  Goal: Maintain or return to baseline ADL function  Description: INTERVENTIONS:  -  Assess patient's ability to carry out ADLs; assess patient's baseline for ADL function and identify physical deficits which impact ability to perform ADLs (bathing, care of mouth/teeth, toileting, grooming, dressing, etc )  - Assess/evaluate cause of self-care deficits   - Assess range of motion  - Assess patient's mobility; develop plan if impaired  - Assess patient's need for assistive devices and provide as appropriate  - Encourage maximum independence but intervene and supervise when necessary  - Involve family in performance of ADLs  - Assess for home care needs following discharge   - Consider OT consult to assist with ADL evaluation and planning for discharge  - Provide patient education as appropriate  Outcome: Progressing  Goal: Maintains/Returns to pre admission functional level  Description: INTERVENTIONS:  - Perform BMAT or MOVE assessment daily    - Set and communicate daily mobility goal to care team and patient/family/caregiver  - Collaborate with rehabilitation services on mobility goals if consulted  - Perform Range of Motion 3 times a day  - Reposition patient every 2 hours    - Dangle patient 3 times a day  - Stand patient 3 times a day  - Ambulate patient 3 times a day  - Out of bed to chair 3 times a day   - Out of bed for meals 3 times a day  - Out of bed for toileting  - Record patient progress and toleration of activity level   Outcome: Progressing     Problem: DISCHARGE PLANNING  Goal: Discharge to home or other facility with appropriate resources  Description: INTERVENTIONS:  - Identify barriers to discharge w/patient and caregiver  - Arrange for needed discharge resources and transportation as appropriate  - Identify discharge learning needs (meds, wound care, etc )  - Arrange for interpretive services to assist at discharge as needed  - Refer to Case Management Department for coordinating discharge planning if the patient needs post-hospital services based on physician/advanced practitioner order or complex needs related to functional status, cognitive ability, or social support system  Outcome: Progressing     Problem: Nutrition/Hydration-ADULT  Goal: Nutrient/Hydration intake appropriate for improving, restoring or maintaining nutritional needs  Description: Monitor and assess patient's nutrition/hydration status for malnutrition  Collaborate with interdisciplinary team and initiate plan and interventions as ordered  Monitor patient's weight and dietary intake as ordered or per policy  Utilize nutrition screening tool and intervene as necessary  Determine patient's food preferences and provide high-protein, high-caloric foods as appropriate       INTERVENTIONS:  - Monitor oral intake, urinary output, labs, and treatment plans  - Assess nutrition and hydration status and recommend course of action  - Evaluate amount of meals eaten  - Assist patient with eating if necessary   - Allow adequate time for meals  - Recommend/ encourage appropriate diets, oral nutritional supplements, and vitamin/mineral supplements  - Order, calculate, and assess calorie counts as needed  - Recommend, monitor, and adjust tube feedings and TPN/PPN based on assessed needs  - Assess need for intravenous fluids  - Provide specific nutrition/hydration education as appropriate  - Include patient/family/caregiver in decisions related to nutrition  Outcome: Progressing

## 2021-06-24 NOTE — QUICK NOTE
Received notification of CT abdomen pelvis results- colon demonstrates mural thickening and loss of haustiration throughout its length, there is dilatation of the cecum and proximal ascending colon  These findings are consistent with the history of C difficile colitis  Findings in the cecum and proximal ascending colon may represent early/developing toxic megacolon  Maximum diameter measures 8 x 5 cm  Notified attending physician  Notified surgery Dr Tran Hamilton  Recommendation is to make NPO, ok to continue DVT prophylaxis, serial labs  Will place surgical consult

## 2021-06-24 NOTE — ASSESSMENT & PLAN NOTE
The colon demonstrates mural thickening and loss of haustration throughout its length; there is dilatation of the cecum and proximal ascending colon  These findings are consistent with the history of C  difficile colitis  Findings in the cecum and   proximal ascending colon may represent early/developing toxic megacolon; maximum diameter measures 8 x 5 cm  Lashae Gallego general surgery consulted, recommends conservative management  Clinically patient does not look toxic- general surgery agrees with clear liquid diet and p o  medication

## 2021-06-24 NOTE — CONSULTS
Consultation - Infectious Disease   Geronimo Armenta 78 y o  male MRN: [id number]  Unit/Bed#: -01 Encounter: [id number]      Inpatient consult to Infectious Diseases  Consult performed by: Cece Randall MD  Consult ordered by: April Briseno MD    Inpatient consult to Infectious Diseases  Consult performed by: Cece Randall MD  Consult ordered by: April Briseno MD            REQUIRED DOCUMENTATION:     1  This service was provided via Telemedicine  2  Provider located at \[facility]\""  3  TeleMed provider: Cece Randall MD   4  Identify all parties in room with patient during tele consult:RN  5  After connecting through [employer], patient was identified by name and date of birth and assistant checked wristband  Patient was then informed that this was a Telemedicine visit and that the exam was being conducted confidentially over secure lines  My office door was closed  No one else was in the room  Patient acknowledged consent and understanding of privacy and security of the Telemedicine visit, and gave us permission to have the assistant stay in the room in order to assist with the history and to conduct the exam   I informed the patient that I have reviewed their record in Epic and presented the opportunity for them to ask any questions regarding the visit today  The patient agreed to participate  Assessment/Recommendations     1  Sepsis, present on admission  - Source of sepsis is likely c diff colitis  Less likely to be infectious/ischemic colitis or bacteremia  - Clinically unchanged, febrile to 101 1 this morning with tachycardia but otherwise hemodynamically stable and no leukocytosis    · FU blood cultures and stool testing  · Agree with holding off on other antibiotics given clinical concern for c diff    2   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  - Diagnosis of cdiff suspected clinically and on imaging    - CT abdomen notes dilated (8x5cm max diameter) cecum   - Stool enterics and PCR for c diff pending  - Has ongoing diarrhea, 4 loose stools overnight and 2 since this morning, remains febrile, has hypoalbuminemia and KATELIN but white count and lactate is normal and abdominal exam is fairly benign except for mild distension    · Continue vancomycin but increase dose to 500 mg qid and add metronidazole 500 iv q8h  · FU stool testing  · FU blood cultures  · Close clinical monitoring with serial abdominal exams and monitor for any developing ileus  · Surgery recommends conservative management and trial of clear liquids; if patient has any clinical worsening would recommend stat KUB and general surgery evaluation    3  Right leg cellulitis - resolved    · Would strictly avoid systemic antibiotics unless clearly indicated, in future would recommend secondary c diff prophylaxis with antibiotics    4  CLL and hx chronic mild leukopenia  - Risk factor for immunosuppression but no evidence of disease progression    · Management per oncology    5  Acute kidney injury  - Likely pre renal   - creatinine improved from 2 48 -> 1 88    · Continue supportive care    Will follow  Thank you for involving me in the care of your patient  Please call with questions, change in clinical status or if tests recommended above are abnormal      Discussed with the primary service  History     Reason for Consult: Sepsis, c diff  HPI: Zev Uriostegui is a 78y o  year old male with CLL and chronic leukopenia and ckd  He has a hx prostate cancer s/p prostatectomy  He was admitted from 3/4 to 3/8 with sepsis, c diff colitis involving the descending and sigmoid colon  He was discharged on oral vancomycin to complete over 10 days of therapy  He presented to the ER on 6/23 with worsening generalized weakness and over 10 days of diarrhea  He had been prescribed keflex for right leg cellulitis that developed after some local trauma   2 days later he had diarrhea and was switched to bactrim which he completed over 4 days ago  In the ER, vitals were notable for fever, tachycardia and tachypnea  Labs were notable for normal white count and elevated creatinine  EKG noted no acute ST changes  CXR showed no infiltrate  CT abdomen noted "colon demonstrates mural thickening and loss of haustiration throughout its length, there is dilatation of the cecum and proximal ascending colon  These findings are consistent with the history of C difficile colitis  Findings in the cecum and proximal ascending colon may represent early/developing toxic megacolon  Maximum diameter measures 8 x 5 cm "  He was admitted and started on oral vancomycin  He remains febrile and notes some abdominal distension but otherwise has no pain  Has ongoing diarrhea  No other symptoms  Otherwise feels well  Infectious disease is being consulted for diagnostic work up and antibiotic management  Review of Systems  Pertinent positives and negatives as noted in HPI  Rest complete 12 point system-based review of systems is otherwise negative  PAST MEDICAL HISTORY:  Past Medical History:   Diagnosis Date    Hypertension     Prostate cancer [facility])      Past Surgical History:   Procedure Laterality Date    APPENDECTOMY      BACK SURGERY  2016    CATARACT EXTRACTION  2020    PROSTATE SURGERY         FAMILY HISTORY:  Non-contributory    SOCIAL HISTORY:  Social History   /Civil Union  Social History     Substance and Sexual Activity   Alcohol Use Not Currently     Social History     Substance and Sexual Activity   Drug Use Not Currently     Social History     Tobacco Use   Smoking Status Former Smoker   Smokeless Tobacco Never Used   Tobacco Comment    Quit at age 23       ALLERGIES:  Allergies   Allergen Reactions    Cephalexin Dizziness       MEDICATIONS:  All current active medications have been reviewed      Physical Exam     Temp:  [97 2 °F (36 2 °C)-101 1 °F (38 4 °C)] 101 1 °F (38 4 °C)  HR:  Victor Manuel Juarez 91  Resp:  [17-20] 17  BP: ()/(49-73) 99/49  SpO2:  [93 %-97 %] 94 %  Temp (24hrs), Av 6 °F (37 6 °C), Min:97 2 °F (36 2 °C), Max:101 1 °F (38 4 °C)  Current: Temperature: (!) 101 1 °F (38 4 °C)    Intake/Output Summary (Last 24 hours) at 2021 0653  Last data filed at 2021 2306  Gross per 24 hour   Intake --   Output 500 ml   Net -500 ml         Physical exam findings reported by bedside and primary medical team staff    General Appearance:  Appearing well, nontoxic, and in no distress, appears stated age   Head:  Normocephalic, without obvious abnormality, atraumatic   Eyes:  PERRL, conjunctiva pink and sclera anicteric, both eyes   Nose: Nares normal, mucosa normal, no drainage   Throat: Oropharynx moist without lesions; lips, mucosa, and tongue normal; teeth and gums normal   Neck: Supple, symmetrical, trachea midline, no adenopathy, no tenderness/mass/nodules   Back:   Symmetric, no curvature, ROM normal, no CVA tenderness   Lungs:   Clear to auscultation bilaterally, no audible wheezes, rhonchi and rales, respirations unlabored   Chest Wall:  No tenderness or deformity   Heart:  Regular rate and rhythm, S1, S2 normal, no murmur, rub or gallop   Abdomen:   Soft, minimally distended, bowel sounds hypoactive but present    No CVA tenderness   Extremities: Extremities normal, atraumatic, no cyanosis, clubbing or edema   Skin: Skin color, texture, turgor normal, no rashes or lesions  No draining wounds noted  Lymph nodes: Cervical, supraclavicular, and axillary nodes normal   Neurologic: Alert and oriented times 3, extremity strength 5/5 and symmetric       Invasive Devices:   Peripheral IV 21 Left Antecubital (Active)   Site Assessment Clean;Dry; Intact 21   Dressing Type Transparent 21   Line Status Saline locked 21   Dressing Status Intact;Dry;Clean 21   Dressing Change Due 21       Labs, Imaging, & Other Studies     Lab Results:    I have personally reviewed pertinent labs  Results from last 7 days   Lab Units 06/24/21  0538 06/23/21  1748   WBC Thousand/uL 7 94 9 26   HEMOGLOBIN g/dL 11 2* 12 7   PLATELETS Thousands/uL 95* 114*     Results from last 7 days   Lab Units 06/24/21  0538 06/23/21  1748   POTASSIUM mmol/L 4 4 4 8   CHLORIDE mmol/L 104 96*   CO2 mmol/L 22 25   BUN mg/dL 35* 42*   CREATININE mg/dL 1 88* 2 48*   EGFR ml/min/1 73sq m 32 23   CALCIUM mg/dL 7 4* 8 0*   AST U/L 15 16   ALT U/L 23 26   ALK PHOS U/L 50 55           Imaging Studies:   I have personally reviewed pertinent imaging study reports and images in PACS  EKG, Pathology, and Other Studies:   I have personally reviewed pertinent reports  Counseling/Coordination of care: Total 70 minutes communication with the patient via telehealth  Labs, medical tests and imaging studies were independently and extensively reviewed by me as noted above in HPI and old records were obtained and summarized as noted above in HPI  My recommendations were discussed with the patient in detail who verbalized understanding

## 2021-06-24 NOTE — ASSESSMENT & PLAN NOTE
Most likely secondary to dehydration and suspected C diff colitis  Baseline creatinine is 1 58   on admission creatinine is 2 48,  With the treatment, condition improving  Lisinopril on hold  Continue IV hydration  Follow renal function  Follow urinary retention protocol

## 2021-06-24 NOTE — PLAN OF CARE
Problem: Potential for Falls  Goal: Patient will remain free of falls  Description: INTERVENTIONS:  - Educate patient/family on patient safety including physical limitations  - Instruct patient to call for assistance with activity   - Consult OT/PT to assist with strengthening/mobility   - Keep Call bell within reach  - Keep bed low and locked with side rails adjusted as appropriate  - Keep care items and personal belongings within reach  - Initiate and maintain comfort rounds  - Make Fall Risk Sign visible to staff  - Offer Toileting every 2 Hours, in advance of need  - Initiate/Maintain bed and chair alarm  - Apply yellow socks and bracelet for high fall risk patients  - Consider moving patient to room near nurses station  Outcome: Progressing     Problem: PAIN - ADULT  Goal: Verbalizes/displays adequate comfort level or baseline comfort level  Description: Interventions:  - Encourage patient to monitor pain and request assistance  - Assess pain using appropriate pain scale  - Administer analgesics based on type and severity of pain and evaluate response  - Implement non-pharmacological measures as appropriate and evaluate response  - Consider cultural and social influences on pain and pain management  - Notify physician/advanced practitioner if interventions unsuccessful or patient reports new pain  Outcome: Progressing     Problem: SAFETY ADULT  Goal: Patient will remain free of falls  Description: INTERVENTIONS:  - Educate patient/family on patient safety including physical limitations  - Instruct patient to call for assistance with activity   - Consult OT/PT to assist with strengthening/mobility   - Keep Call bell within reach  - Keep bed low and locked with side rails adjusted as appropriate  - Keep care items and personal belongings within reach  - Initiate and maintain comfort rounds  - Make Fall Risk Sign visible to staff  - Offer Toileting every 2 Hours, in advance of need  - Initiate/Maintain bed and chair alarm  - Apply yellow socks and bracelet for high fall risk patients  - Consider moving patient to room near nurses station  Outcome: Progressing  Goal: Maintain or return to baseline ADL function  Description: INTERVENTIONS:  - Educate patient/family on patient safety including physical limitations  - Instruct patient to call for assistance with activity   - Consult OT/PT to assist with strengthening/mobility   - Keep Call bell within reach  - Keep bed low and locked with side rails adjusted as appropriate  - Keep care items and personal belongings within reach  - Initiate and maintain comfort rounds  - Make Fall Risk Sign visible to staff  - Offer Toileting every 2 Hours, in advance of need  - Initiate/Maintain bed and chair alarm  - Apply yellow socks and bracelet for high fall risk patients  - Consider moving patient to room near nurses station  Outcome: Progressing  Goal: Maintains/Returns to pre admission functional level  Description: INTERVENTIONS:  - Perform BMAT or MOVE assessment daily    - Set and communicate daily mobility goal to care team and patient/family/caregiver     - Collaborate with rehabilitation services on mobility goals if consulted  - Out of bed for toileting  - Record patient progress and toleration of activity level   Outcome: Progressing     Problem: DISCHARGE PLANNING  Goal: Discharge to home or other facility with appropriate resources  Description: INTERVENTIONS:  - Identify barriers to discharge w/patient and caregiver  - Arrange for needed discharge resources and transportation as appropriate  - Identify discharge learning needs (meds, wound care, etc )  - Arrange for interpretive services to assist at discharge as needed  - Refer to Case Management Department for coordinating discharge planning if the patient needs post-hospital services based on physician/advanced practitioner order or complex needs related to functional status, cognitive ability, or social support system  Outcome: Progressing     Problem: Nutrition/Hydration-ADULT  Goal: Nutrient/Hydration intake appropriate for improving, restoring or maintaining nutritional needs  Description: Monitor and assess patient's nutrition/hydration status for malnutrition  Collaborate with interdisciplinary team and initiate plan and interventions as ordered  Monitor patient's weight and dietary intake as ordered or per policy  Utilize nutrition screening tool and intervene as necessary  Determine patient's food preferences and provide high-protein, high-caloric foods as appropriate       INTERVENTIONS:  - Monitor oral intake, urinary output, labs, and treatment plans  - Assess nutrition and hydration status and recommend course of action  - Evaluate amount of meals eaten  - Assist patient with eating if necessary   - Allow adequate time for meals  - Recommend/ encourage appropriate diets, oral nutritional supplements, and vitamin/mineral supplements  - Order, calculate, and assess calorie counts as needed  - Recommend, monitor, and adjust tube feedings and TPN/PPN based on assessed needs  - Assess need for intravenous fluids  - Provide specific nutrition/hydration education as appropriate  - Include patient/family/caregiver in decisions related to nutrition  Outcome: Progressing

## 2021-06-24 NOTE — ASSESSMENT & PLAN NOTE
Most likely hypovolemic hyponatremia secondary to diarrhea   on admissionSodium level is 128,  Today sodium level is 135  Continue IV hydration  Continue to monitor

## 2021-06-24 NOTE — ASSESSMENT & PLAN NOTE
6 x 4 mm nodule in the right middle lobe (2:3)  4 mm nodule right middle lobe (2:3)  Right lower lobe 6 x 7 mm nodule (2:2)   These are similar to the prior exam from 3/4/2021     patient will need outpatient follow-up

## 2021-06-24 NOTE — UTILIZATION REVIEW
Initial Clinical Review    Admission: Date/Time/Statement:   Admission Orders (From admission, onward)     Ordered        06/23/21 1831  Inpatient Admission  Once                   Orders Placed This Encounter   Procedures    Inpatient Admission     Standing Status:   Standing     Number of Occurrences:   1     Order Specific Question:   Level of Care     Answer:   Med Surg [16]     Order Specific Question:   Estimated length of stay     Answer:   More than 2 Midnights     Order Specific Question:   Certification     Answer:   I certify that inpatient services are medically necessary for this patient for a duration of greater than two midnights  See H&P and MD Progress Notes for additional information about the patient's course of treatment  ED Arrival Information     Expected Arrival Acuity    6/23/2021 6/23/2021 17:30 Emergent         Means of arrival Escorted by Service Admission type    Ambulance [facility] Emergency         Arrival complaint    Diarrhea        Chief Complaint   Patient presents with    Diarrhea     pt c/o diarrhea for past 10 days  pt has been taking abx per leg infection  denies travel/sob/fevers/cough       Initial Presentation: 78year old male to the ED via EMS with complaints of diarrhea, weakness for unknown amount of time  Recently completed course of abx for cellulitis  Admitted to inpatient for KATELIN, Sepsis, hyponatremia, diarrhea  H/O C-diff  CT a/p shows: colon demonstrates mural thickening and loss of haustiration throughout its length, there is dilatation of the cecum and proximal ascending colon  These findings are consistent with the history of C difficile colitis  Findings in the cecum and proximal ascending colon may represent early/developing toxic megacolon  NPO  Gen/surg consult  He is tachycardic, tachypneic, sodium 128  Send stool for c-diff  IV fluids started  Baseline crea 1 58  ON arrival, it is 2 48  Blood cultures pending   Start vanco  Date: 6/24   Day 2: Continue with IV fluids  Creat improved  Continue to follow  Trial clear liquids  Does not look toxic at this time, but continues with diarrhea  Gen/surg consult:  Febrile this morning  Abdomen soft, nontender  Given clinical picture, hightly suspicious for c-diff  No surgical intervention at this time  COntinue with IV fluids, vanco      ID consult: Source of sepsis likely C-diff colitis  Febrile with tachycardia  Has ongoing diarrhea and hypoalbuminemia  Follow up blood and stool cultures  Right leg cellulitis resolved       ED Triage Vitals   Temperature Pulse Respirations Blood Pressure SpO2   06/23/21 1731 06/23/21 1731 06/23/21 1731 06/23/21 1731 06/23/21 1731   99 3 °F (37 4 °C) 67 17 122/59 96 %      Temp Source Heart Rate Source Patient Position - Orthostatic VS BP Location FiO2 (%)   06/23/21 1731 06/23/21 1731 06/23/21 1731 06/23/21 1731 --   Temporal Monitor Lying Right arm       Pain Score       06/23/21 1851       No Pain          Wt Readings from Last 1 Encounters:   06/24/21 82 2 kg (181 lb 4 oz)     Additional Vital Signs:   /Time  Temp  Pulse  Resp  BP  MAP (mmHg)  SpO2  O2 Device  Patient Position - Orthostatic VS   06/24/21 09:27:26  --  92  --  115/61  79  93 %  --  --   06/24/21 0719  --  --  --  --  --  --  None (Room air)  --   06/24/21 07:02:46  101 1 °F (38 4 °C)Abnormal   91  17  99/49Abnormal   66  94 %  --  --   06/23/21 23:09:09  97 2 °F (36 2 °C)Abnormal   78  20  122/66  85  96 %  --  --   06/23/21 2046  --  91  19  --  --  94 %  --  --   06/23/21 2000  --  --  --  --  --  93 %  None (Room air)  --   06/23/21 1910  99 7 °F (37 6 °C)  92  20  125/73  --  --  --  Lying   06/23/21 1841  100 8 °F (38 2 °C)Abnormal   88  --  123/58  --  97 %  None (Room air)  Lying   06/23/21 1839  --  --  --  --  --  --  None (Room air)  --   06/23/21 1830  --  97  18  122/58  83  95 %  --  --   06/23/21 1800  --  92  18  109/55  77  95 %  --  --   06/23/21 1731  99 3 °F (37 4 °C)  67  17  122/59  --             Pertinent Labs/Diagnostic Test Results:   6/23 CT abd/pelvis: colon demonstrates mural thickening and loss of haustration throughout its length; there is dilatation of the cecum and proximal ascending colon  These findings are consistent with the history of C  difficile colitis  Findings in the cecum and   proximal ascending colon may represent early/developing toxic megacolon; maximum diameter measures 8 x 5 cm          6 x 4 mm nodule in the right middle lobe (2:3)  4 mm nodule right middle lobe (2:3)  Right lower lobe 6 x 7 mm nodule (2:2)  These are similar to the prior exam from 3/4/2021   Based on current Fleischner Society 2017 Guidelines on incidental pulmonary   nodule, followup non-contrast CT is recommended at 3-6 months from the       Results from last 7 days   Lab Units 06/24/21  0538 06/23/21  1748   WBC Thousand/uL 7 94 9 26   HEMOGLOBIN g/dL 11 2* 12 7   HEMATOCRIT % 33 0* 38 4   PLATELETS Thousands/uL 95* 114*   NEUTROS ABS Thousands/µL 4 33 5 02         Results from last 7 days   Lab Units 06/24/21  0538 06/23/21  1748   SODIUM mmol/L 135* 128*   POTASSIUM mmol/L 4 4 4 8   CHLORIDE mmol/L 104 96*   CO2 mmol/L 22 25   ANION GAP mmol/L 9 7   BUN mg/dL 35* 42*   CREATININE mg/dL 1 88* 2 48*   EGFR ml/min/1 73sq m 32 23   CALCIUM mg/dL 7 4* 8 0*     Results from last 7 days   Lab Units 06/24/21  0538 06/23/21  1748   AST U/L 15 16   ALT U/L 23 26   ALK PHOS U/L 50 55   TOTAL PROTEIN g/dL 5 4* 6 7   ALBUMIN g/dL 2 4* 3 1*   TOTAL BILIRUBIN mg/dL 0 37 0 41         Results from last 7 days   Lab Units 06/24/21  0538 06/23/21  1748   GLUCOSE RANDOM mg/dL 104 112     Results from last 7 days   Lab Units 06/23/21  1748   CK TOTAL U/L 78     Results from last 7 days   Lab Units 06/23/21  1748   TROPONIN I ng/mL <0 02       Results from last 7 days   Lab Units 06/23/21  2018   PROCALCITONIN ng/ml 0 08     Results from last 7 days   Lab Units 06/23/21  2018   LACTIC ACID mmol/L 0 7       Results from last 7 days   Lab Units 06/23/21  1748   LIPASE u/L 212         Results from last 7 days   Lab Units 06/23/21  2018   BLOOD CULTURE  Received in Microbiology Lab  Culture in Progress  Received in Microbiology Lab  Culture in Progress  ED Treatment:   Medication Administration from 06/23/2021 1730 to 06/23/2021 1859       Date/Time Order Dose Route Action     06/23/2021 1749 sodium chloride 0 9 % bolus 1,000 mL 1,000 mL Intravenous New Bag     06/23/2021 1851 acetaminophen (TYLENOL) tablet 650 mg 650 mg Oral Given        Past Medical History:   Diagnosis Date    Hypertension     Prostate cancer ([facility] )        Admitting Diagnosis: Diarrhea [R19 7]  Hyponatremia [E87 1]  KATELIN (acute kidney injury) ([facility] ) [N17 9]  Diarrhea, unspecified type [R19 7]  Age/Sex: 78 y o  male  Admission Orders:  Scheduled Medications:  atorvastatin, 40 mg, Oral, Daily With Dinner  cholecalciferol, 1,000 Units, Oral, Daily  gabapentin, 300 mg, Oral, TID  heparin (porcine), 5,000 Units, Subcutaneous, Q8H [facility]  metroNIDAZOLE, 500 mg, Intravenous, Q8H  vancomycin, 500 mg, Oral, Q6H [facility]      Continuous IV Infusions:  sodium chloride, 125 mL/hr, Intravenous, Continuous      PRN Meds:  acetaminophen, 650 mg, Oral, Q6H PRN  ondansetron, 4 mg, Intravenous, Q4H PRN        IP CONSULT TO CASE MANAGEMENT  IP CONSULT TO ACUTE CARE SURGERY  IP CONSULT TO INFECTIOUS DISEASES  IP CONSULT TO INFECTIOUS DISEASES    Network Utilization Review Department  ATTENTION: Please call with any questions or concerns to [phone] and carefully listen to the prompts so that you are directed to the right person  All voicemails are confidential   Moris James all requests for admission clinical reviews, approved or denied determinations and any other requests to dedicated fax number below belonging to the campus where the patient is receiving treatment   List of dedicated fax numbers for the Facilities:  [address] DENIALS (Administrative/Medical Necessity) [phone]   [address] (Maternity/NICU/Pediatrics) 261 [facility] Floor [facility] [address] [place] [address] Dr Xiomara Diopel Charley [phone] [place] [address] [address]re[address] [facility] [address] [facility] [phone]

## 2021-06-24 NOTE — PROGRESS NOTES
CM consulted for post acute needs  Pt is admitted with sepsis with acute organ dysfunction  dilated cecum 8 x 5 cm, possible early developing toxic megacolon  Checking blood and urine- surgery is follow  CM met with patient at the bedside,baseline information  was obtained  CM discussed the role of CM in helping the patient develop a discharge plan and assist the patient in carry out their plan        06/24/21 [address] [place]   Patient Information   Mental Status Alert   Primary Caregiver Self   Support System Immediate family   Activities of Daily Living Prior to Admission   Functional Status Minimum assistance   Assistive Device Walker  (Recently started using a walker about 2 wks prior to admission)   Living Arrangement Lives with someone;House   Ambulation Minimum assistance   Income Information   Income Source Pension/care home   Means of Transportation   Means of Transport to Appts: [facility] [address]       Patient has identified: spouse Katie as his primary   She  is able to assist upon discharge  [place] is the POA, + advance directive:   requested patient have family bring in a copy of their AD/POA paperwork to be scanned into the chart  PCP:  [place]           Pt has a prescription plan and uses [address] is a : Pt does Not seek care at the [place]  Pt denies SA/MH history  Pt is  59 years and has 2 adult children  ( 1 is local and the other is in [facility] [place])  Wife is able to assist upon [address] set up: 6 belle the 2 story home with bedrooms/bathrooms up 13 steps  Functional level PTA: per patient progressive weakness the last 2 weeks start  DME use: walker  Prior use of Home Care or STR: No  Transportation: pt drives, and wife is available for transport home  Community Resources: none    CM reviewed d/c planning process including the following: identifying help at home, patient preference for d/c planning needs, availability of treatment team to discuss questions or concerns patient and/or family may have regarding understanding medications and recognizing signs and symptoms once discharged  CM also encouraged patient to follow up with all recommended appointments after discharge  Patient advised of importance for patient and family to participate in managing patients medical well being  Goal of patient upon discharge is to go home  CM will continue to follow, therapy evaluation is pending-- Pt may need HHC vs STR to be determined

## 2021-06-24 NOTE — ASSESSMENT & PLAN NOTE
Sepsis with KATELIN  Patient   Mid the criteria with fever, tachycardic, tachypneic-also having diarrhea, suspected infectious in nature based on patient previous history of C diff and recent history of antibiotic use  Follow lactic acid, procalcitonin,    pendingblood culture  Follow stool for C diff, enteric panel  Continue p o   Vancomycin,  IV Flagyl  Continue IV hydration   appreciate ID recommendation

## 2021-06-24 NOTE — CONSULTS
Consultation - General Surgery   Sherifmercyrajesh Ro 78 y o  male MRN: [id number]  Unit/Bed#: -01 Encounter: [id number]    Assessment/Plan     Assessment:  -77 yo male patient admitted with diarrhea, likely infectious etiology  -Denies any abdominal pain, no nausea or vomiting  -CT scan abdomen and pelvis without contrast done in the ED suggested dilated cecum 8 x 5 cm, possible early developing toxic megacolon  At this time low suspicion for toxic megacolon on exam   Abdomen is soft and nontender  Minimal distention, bowel sounds are hypoactive but present in nature   -Patient continues with loose diarrhea which she has had for the past 10 days  Three or 4 episodes of foul-smelling liquid bowel movement yesterday, once this morning   -Temp this morning 101 1  Lactic acid on admission normal   -Patient with recent history antibiotic use for cellulitis right posterior lower extremity and developed  diarrhea over a week ago  -Dehydration  -Hyponatremia, sodium 128  -Acute kidney injury, creatinine 2 48 on admission, baseline 1 58  -6 x 4 mm nodule in the right middle lobe  4 mm nodule right middle lobe  Right lower lobe  6 x 7 mm  nodule   These are similar to the prior exam from 3/4/2021  -Chronic lymphocytic leukemia by history, WBC on admission 7 94  -Hypertension, BP 99/49 at this time  -History of CA prostate, status post suprapubic prostatectomy about 4 years ago  -Prior appendectomy age 6    Plan:  -Repeat vital signs at this time     -Needs IV fluids for hydration   -Right now no indication for any general surgical intervention, will continue to monitor  -Serial abdominal exams  - If the patient develops increased abdominal distention, then would check abdominal x-ray for any  development of increased size of the colonic distension  -Stool sent for enteric bacterial panel and C difficile toxin, obtained in the ED last evening  -Nothing by mouth  -Blood cultures x2 sent, trend procalcitonin level  -Antiemetics p r n   -Tylenol p r n  Fever  -Continue to monitor vital signs  -Morning labs including CBC, metabolic profile  -Urinary retention protocol  -If the patient develops any nausea or vomiting, then would likely need NG tube decompression  -He will need outpatient GI follow-up and colonoscopy at some point after hospital discharge   -will need non contrast CT scan in 3-6 months for evaluation of right pulmonary nodules per   Radiology recommendation   -Medical management per the attending hospitalist physician,      History of Present Illness      HPI:  Wan Mederos is a 78 y o  male with a history of chronic lymphocytic leukemia who presents with multiple episodes of loose foul-smelling diarrhea which she has had for about 10 days  Patient was brought to the hospital and seen emergency department here last evening  Patient can be forgetful and not a great historian  He denies any abdominal pain at all  No nausea or vomiting  Apparently the patient had been treated as an outpatient for cellulitis of his right posterior upper calf area about 2 weeks ago and prescribed antibiotics  The patient finished full course of p o  Bactrim now 4 days ago upon review of his records on care now prescribed by his family physician in [facility] from [address]  Patient has been having nonpainful loose watery bowel movements, 3 or 4 daily  No blood in the stool  He denies prior history of any colonoscopy  No prior history of similar symptoms  He has had generalized weakness and his wife thought that he was getting dehydrated which prompted her to bring him to the hospital yesterday  The patient denies any nausea or vomiting  He currently has a temperature of 101 1°  He did have 1 loose watery bowel movement this morning    Stool for enteric panel and C difficile toxin was obtained and sent for culture last evening, he is not on the intima antibiotics at this time, blood cultures x2 were sent from the ED  Concern over CT scan of the abdomen without contrast showed dilated cecum 8 x 5 cm  Possible early developing toxic megacolon  General surgery was asked evaluate the patient in consultation at this time  Inpatient consult to Acute Care Surgery  Consult performed by: Ritika Davis PA-C  Consult ordered by: MAGDY Carlos          Review of Systems   Constitutional: Positive for activity change, fatigue and fever  Negative for appetite change, chills, diaphoresis and unexpected weight change  HENT: Negative  Eyes: Negative  Respiratory: Negative for cough, shortness of breath, wheezing and stridor  Cardiovascular: Positive for chest pain  Negative for palpitations and leg swelling  Gastrointestinal: Positive for abdominal distention and diarrhea  Negative for abdominal pain, anal bleeding, blood in stool, constipation, nausea, rectal pain and vomiting  Endocrine: Negative  Genitourinary: Negative for decreased urine volume, difficulty urinating, dysuria, flank pain, frequency, hematuria and urgency  Musculoskeletal: Negative  Skin: Negative  Patient was treated over 2 weeks ago for cellulitis with a small ulcer on the back of his right calf treated by the primary care physician in [facility]  He had been treated with Bactrim twice a day which he completed now 4 days ago  Allergic/Immunologic: Negative  Neurological: Positive for weakness  Negative for tremors, seizures, syncope, facial asymmetry, speech difficulty, light-headedness and numbness  Hematological: Negative for adenopathy  Does not bruise/bleed easily  Psychiatric/Behavioral: Negative               Historical Information   Past Medical History:   Diagnosis Date    Hypertension     Prostate cancer [facility])      Past Surgical History:   Procedure Laterality Date    APPENDECTOMY      BACK SURGERY  2016    CATARACT EXTRACTION  2020    PROSTATE SURGERY       Social History   Social History     Substance and Sexual Activity   Alcohol Use Not Currently     Social History     Substance and Sexual Activity   Drug Use Not Currently     E-Cigarette/Vaping    E-Cigarette Use Never User      E-Cigarette/Vaping Substances     Social History     Tobacco Use   Smoking Status Former Smoker   Smokeless Tobacco Never Used   Tobacco Comment    Quit at age 23     Family History: Negative for heart disease, diabetes, cancer  Patient has 2 children living and well       Meds/Allergies   current meds:   Current Facility-Administered Medications   Medication Dose Route Frequency    acetaminophen (TYLENOL) tablet 650 mg  650 mg Oral Q6H PRN    atorvastatin (LIPITOR) tablet 40 mg  40 mg Oral Daily With Dinner    cholecalciferol (VITAMIN D3) tablet 1,000 Units  1,000 Units Oral Daily    gabapentin (NEURONTIN) capsule 300 mg  300 mg Oral TID    heparin (porcine) subcutaneous injection 5,000 Units  5,000 Units Subcutaneous Q8H [facility]    ondansetron (ZOFRAN) injection 4 mg  4 mg Intravenous Q4H PRN    sodium chloride 0 9 % infusion  125 mL/hr Intravenous Continuous    vancomycin (VANCOCIN) oral solution 125 mg  125 mg Oral Q6H [facility]     Allergies   Allergen Reactions    Cephalexin Dizziness       Objective   First Vitals:   Blood Pressure: 122/59 (06/23/21 1731)  Pulse: 67 (06/23/21 1731)  Temperature: 99 3 °F (37 4 °C) (06/23/21 1731)  Temp Source: Temporal (06/23/21 1731)  Respirations: 17 (06/23/21 1731)  Height: 5' 8" (172 7 cm) (06/23/21 1731)  Weight - Scale: 84 8 kg (186 lb 15 2 oz) (06/23/21 1731)  SpO2: 96 % (06/23/21 1731)    Current Vitals:   Blood Pressure: (!) 99/49 (06/24/21 0702)  Pulse: 91 (06/24/21 0702)  Temperature: (!) 101 1 °F (38 4 °C) (06/24/21 0702)  Temp Source: Oral (06/23/21 1910)  Respirations: 17 (06/24/21 0702)  Height: 5' 8" (172 7 cm) (06/23/21 1731)  Weight - Scale: 82 2 kg (181 lb 4 oz) (06/24/21 0600)  SpO2: 94 % (06/24/21 0702)     I/O       06/22 0701 - 06/23 0700 06/23 0701 - 06/24 0700 06/24 0701 - 06/25 0700    Urine (mL/kg/hr)  0     Stool  500     Total Output  500     Net  -500            Unmeasured Urine Occurrence  2 x 1 x    Unmeasured Stool Occurrence   1 x          Invasive Devices     Peripheral Intravenous Line            Peripheral IV 06/23/21 Left Antecubital <1 day                Physical Exam     Awake, alert  Speech is clear  The patient is oriented, can be forgetful  He does not appear toxic  Skin warm dry to touch  No pallor or jaundice  ENT clear  Oral mucosa somewhat dry  PERRLA, EOMI  Neck trachea midline  No carotid bruit, no goiter palpable  Chest symmetric  Heart regular rate and rhythm  No murmur gallop is heard  Lungs clear, no rales or rhonchi  Back nontender, appears straight  Abdomen mildly distended  Surgical scar on the midline in the suprapubic area, also another scar over McBurney's point  Bowel sounds are hypoactive and distant, no present in 4 quadrants  Some mild tympany to percussion over the right lower quadrant, no guarding or masses  No rebound tenderness  Extremities without calf tenderness or edema  Inspection of the right posterior calf does not reveal any rash, calf muscles are soft and supple  No edema  Moves all 4 extremities well, ambulation not observed  Neurological exam does not show any tremor, mental status appropriate  No focal motor sensory neurologic weakness  Cranial nerves 2-12 appear symmetrical   Equal  strength enhanced  Lab Results:   I have personally reviewed pertinent lab results    , CBC:   Lab Results   Component Value Date    WBC 7 94 06/24/2021    HGB 11 2 (L) 06/24/2021    HCT 33 0 (L) 06/24/2021    MCV 94 06/24/2021    PLT 95 (L) 06/24/2021    MCH 31 8 06/24/2021    MCHC 33 9 06/24/2021    RDW 14 0 06/24/2021    MPV 10 5 06/24/2021    NRBC 0 06/24/2021   , CMP:   Lab Results   Component Value Date    SODIUM 135 (L) 06/24/2021    K 4 4 06/24/2021     06/24/2021    CO2 22 06/24/2021    BUN 35 (H) 06/24/2021    CREATININE 1 88 (H) 06/24/2021    CALCIUM 7 4 (L) 06/24/2021    AST 15 06/24/2021    ALT 23 06/24/2021    ALKPHOS 50 06/24/2021    EGFR 32 06/24/2021   , Coagulation: No results found for: PT, INR, APTT, Urinalysis: No results found for: Dorthey Docker, SPECGRAV, PHUR, LEUKOCYTESUR, NITRITE, PROTEINUA, GLUCOSEU, KETONESU, BILIRUBINUR, BLOODU, Lipase:   Lab Results   Component Value Date    LIPASE 212 06/23/2021     Lactic Acid with Reflex STAT  Order: [id number]  Status:  Final result   Visible to patient:  Yes [facility]   Next appt:  None   0 Result Notes   Ref Range & Units 6/23/21 2018   LACTIC ACID 0 5 - 2 0 mmol/L 0 7                Imaging: I have personally reviewed pertinent films in PACS     CT ABDOMEN AND PELVIS WITHOUT IV CONTRAST     INDICATION:   Abdominal distension  Abdominal pain, acute, nonlocalized  Abdominal pain      COMPARISON:  3/4/2021      TECHNIQUE:  CT examination of the abdomen and pelvis was performed without intravenous contrast   Axial, sagittal, and coronal 2D reformatted images were created from the source data and submitted for interpretation       Radiation dose length product (DLP) for this visit:  712 mGy-cm   This examination, like all CT scans performed in the [facility], was performed utilizing techniques to minimize radiation dose exposure, including the use of iterative   reconstruction and automated exposure control       Enteric contrast was administered       FINDINGS:     ABDOMEN     LOWER CHEST:  6 x 4 mm nodule in the right middle lobe (2:3)  4 mm nodule right middle lobe (2:3)  Right lower lobe 6 x 7 mm nodule (2:2)  These are similar to the prior exam from 3/4/2021   Based on current Fleischner Society 2017 Guidelines on   incidental pulmonary nodule, followup non-contrast CT is recommended at 3-6 months from the initial examination and, if stable at that time, an additional followup is recommended for 18-24 months from the initial examination         Right posteromedial basilar ill-defined groundglass opacities      Small hiatal hernia      LIVER/BILIARY TREE:  There are one or more hepatic simple cyst(s) present  No CT evidence of suspicious solid hepatic mass  Normal hepatic contours  No biliary dilatation      GALLBLADDER:  No calcified gallstones  No pericholecystic inflammatory change      SPLEEN:  Unremarkable      PANCREAS:  Unremarkable      ADRENAL GLANDS:  Unremarkable      KIDNEYS/URETERS:  One or more simple renal cyst(s) is noted  Otherwise unremarkable kidneys  No hydronephrosis      STOMACH AND BOWEL:  The colon demonstrates mural thickening and loss of haustration throughout its length; there is dilatation of the cecum and proximal ascending colon  These findings are consistent with the history of C  difficile colitis  Findings in   the cecum and proximal ascending colon may represent early/developing toxic megacolon; maximum diameter measures 8 x 5 cm        APPENDIX:  No findings to suggest appendicitis  Appendix not seen      ABDOMINOPELVIC CAVITY:  Trace ascites in the paracolic gutters     No pneumoperitoneum  No lymphadenopathy      VESSELS:  Unremarkable for patient's age      PELVIS     REPRODUCTIVE ORGANS:  Status post prostatectomy      URINARY BLADDER:  Unremarkable      ABDOMINAL WALL/INGUINAL REGIONS:  Unremarkable      OSSEOUS STRUCTURES: Postsurgical changes are noted in the lumbar spine  1 6 x 1 1 cm lytic lesion in the right peritrochanteric femur with a well-circumscribed, not sclerotic margin and internal mineralization is unchanged from the prior exam      No acute fracture or destructive osseous lesion      IMPRESSION:     The colon demonstrates mural thickening and loss of haustration throughout its length; there is dilatation of the cecum and proximal ascending colon  These findings are consistent with the history of C  difficile colitis   Findings in the cecum and proximal ascending colon may represent early/developing toxic megacolon; maximum diameter measures 8 x 5 cm           6 x 4 mm nodule in the right middle lobe (2:3)  4 mm nodule right middle lobe (2:3)  Right lower lobe 6 x 7 mm nodule (2:2)  These are similar to the prior exam from 3/4/2021  Based on current Fleischner Society 2017 Guidelines on incidental pulmonary   nodule, followup non-contrast CT is recommended at 3-6 months from the initial examination and, if stable at that time, an additional followup is recommended for 18-24 months from the initial examination  EKG, Pathology, and Other Studies: I have personally reviewed pertinent reports  Counseling / Coordination of Care  Total floor / unit time spent today 50 minutes  Greater than 50% of total time was spent with the patient and / or family counseling and / or coordination of care  A description of the counseling / coordination of care:  Review of CT scan imaging, laboratory values, past medical history, examination patient all performed by this provider        Jordon Jones PA-C

## 2021-06-25 LAB
ALBUMIN SERPL BCP-MCNC: 2.5 G/DL (ref 3.5–5)
ALP SERPL-CCNC: 54 U/L (ref 46–116)
ALT SERPL W P-5'-P-CCNC: 23 U/L (ref 12–78)
ANION GAP SERPL CALCULATED.3IONS-SCNC: 12 MMOL/L (ref 4–13)
AST SERPL W P-5'-P-CCNC: 18 U/L (ref 5–45)
BASOPHILS # BLD AUTO: 0.01 THOUSANDS/ΜL (ref 0–0.1)
BASOPHILS NFR BLD AUTO: 0 % (ref 0–1)
BILIRUB SERPL-MCNC: 0.38 MG/DL (ref 0.2–1)
BUN SERPL-MCNC: 20 MG/DL (ref 5–25)
CALCIUM ALBUM COR SERPL-MCNC: 8.9 MG/DL (ref 8.3–10.1)
CALCIUM SERPL-MCNC: 7.7 MG/DL (ref 8.3–10.1)
CHLORIDE SERPL-SCNC: 104 MMOL/L (ref 100–108)
CO2 SERPL-SCNC: 20 MMOL/L (ref 21–32)
CREAT SERPL-MCNC: 1.52 MG/DL (ref 0.6–1.3)
EOSINOPHIL # BLD AUTO: 0.01 THOUSAND/ΜL (ref 0–0.61)
EOSINOPHIL NFR BLD AUTO: 0 % (ref 0–6)
ERYTHROCYTE [DISTWIDTH] IN BLOOD BY AUTOMATED COUNT: 14.1 % (ref 11.6–15.1)
GFR SERPL CREATININE-BSD FRML MDRD: 42 ML/MIN/1.73SQ M
GLUCOSE SERPL-MCNC: 109 MG/DL (ref 65–140)
HCT VFR BLD AUTO: 35.7 % (ref 36.5–49.3)
HGB BLD-MCNC: 11.9 G/DL (ref 12–17)
IMM GRANULOCYTES # BLD AUTO: 0.07 THOUSAND/UL (ref 0–0.2)
IMM GRANULOCYTES NFR BLD AUTO: 1 % (ref 0–2)
LYMPHOCYTES # BLD AUTO: 1.93 THOUSANDS/ΜL (ref 0.6–4.47)
LYMPHOCYTES NFR BLD AUTO: 26 % (ref 14–44)
MCH RBC QN AUTO: 31.8 PG (ref 26.8–34.3)
MCHC RBC AUTO-ENTMCNC: 33.3 G/DL (ref 31.4–37.4)
MCV RBC AUTO: 96 FL (ref 82–98)
MONOCYTES # BLD AUTO: 1.33 THOUSAND/ΜL (ref 0.17–1.22)
MONOCYTES NFR BLD AUTO: 18 % (ref 4–12)
NEUTROPHILS # BLD AUTO: 3.95 THOUSANDS/ΜL (ref 1.85–7.62)
NEUTS SEG NFR BLD AUTO: 55 % (ref 43–75)
NRBC BLD AUTO-RTO: 0 /100 WBCS
PLATELET # BLD AUTO: 107 THOUSANDS/UL (ref 149–390)
PMV BLD AUTO: 10.5 FL (ref 8.9–12.7)
POTASSIUM SERPL-SCNC: 4 MMOL/L (ref 3.5–5.3)
PROCALCITONIN SERPL-MCNC: 0.07 NG/ML
PROT SERPL-MCNC: 5.6 G/DL (ref 6.4–8.2)
RBC # BLD AUTO: 3.74 MILLION/UL (ref 3.88–5.62)
SODIUM SERPL-SCNC: 136 MMOL/L (ref 136–145)
WBC # BLD AUTO: 7.3 THOUSAND/UL (ref 4.31–10.16)

## 2021-06-25 PROCEDURE — 97163 PT EVAL HIGH COMPLEX 45 MIN: CPT

## 2021-06-25 PROCEDURE — 84145 PROCALCITONIN (PCT): CPT | Performed by: FAMILY MEDICINE

## 2021-06-25 PROCEDURE — 85025 COMPLETE CBC W/AUTO DIFF WBC: CPT | Performed by: FAMILY MEDICINE

## 2021-06-25 PROCEDURE — 99232 SBSQ HOSP IP/OBS MODERATE 35: CPT | Performed by: FAMILY MEDICINE

## 2021-06-25 PROCEDURE — 97116 GAIT TRAINING THERAPY: CPT

## 2021-06-25 PROCEDURE — 80053 COMPREHEN METABOLIC PANEL: CPT | Performed by: FAMILY MEDICINE

## 2021-06-25 PROCEDURE — NC001 PR NO CHARGE: Performed by: INTERNAL MEDICINE

## 2021-06-25 PROCEDURE — 97167 OT EVAL HIGH COMPLEX 60 MIN: CPT

## 2021-06-25 PROCEDURE — NC001 PR NO CHARGE: Performed by: PHYSICIAN ASSISTANT

## 2021-06-25 RX ORDER — CALCIUM CARBONATE 200(500)MG
500 TABLET,CHEWABLE ORAL 2 TIMES DAILY PRN
Status: DISCONTINUED | OUTPATIENT
Start: 2021-06-25 | End: 2021-06-26 | Stop reason: HOSPADM

## 2021-06-25 RX ORDER — SODIUM CHLORIDE, SODIUM GLUCONATE, SODIUM ACETATE, POTASSIUM CHLORIDE, MAGNESIUM CHLORIDE, SODIUM PHOSPHATE, DIBASIC, AND POTASSIUM PHOSPHATE .53; .5; .37; .037; .03; .012; .00082 G/100ML; G/100ML; G/100ML; G/100ML; G/100ML; G/100ML; G/100ML
75 INJECTION, SOLUTION INTRAVENOUS CONTINUOUS
Status: DISCONTINUED | OUTPATIENT
Start: 2021-06-25 | End: 2021-06-26 | Stop reason: HOSPADM

## 2021-06-25 RX ORDER — FAMOTIDINE 20 MG/1
20 TABLET, FILM COATED ORAL DAILY
Status: DISCONTINUED | OUTPATIENT
Start: 2021-06-26 | End: 2021-06-26 | Stop reason: HOSPADM

## 2021-06-25 RX ADMIN — SODIUM CHLORIDE, SODIUM GLUCONATE, SODIUM ACETATE, POTASSIUM CHLORIDE, MAGNESIUM CHLORIDE, SODIUM PHOSPHATE, DIBASIC, AND POTASSIUM PHOSPHATE 125 ML/HR: .53; .5; .37; .037; .03; .012; .00082 INJECTION, SOLUTION INTRAVENOUS at 09:41

## 2021-06-25 RX ADMIN — HEPARIN SODIUM 5000 UNITS: 5000 INJECTION INTRAVENOUS; SUBCUTANEOUS at 06:28

## 2021-06-25 RX ADMIN — Medication 1000 UNITS: at 08:02

## 2021-06-25 RX ADMIN — METRONIDAZOLE 500 MG: 500 INJECTION, SOLUTION INTRAVENOUS at 11:00

## 2021-06-25 RX ADMIN — Medication 500 MG: at 06:28

## 2021-06-25 RX ADMIN — SODIUM CHLORIDE, SODIUM GLUCONATE, SODIUM ACETATE, POTASSIUM CHLORIDE, MAGNESIUM CHLORIDE, SODIUM PHOSPHATE, DIBASIC, AND POTASSIUM PHOSPHATE 75 ML/HR: .53; .5; .37; .037; .03; .012; .00082 INJECTION, SOLUTION INTRAVENOUS at 23:37

## 2021-06-25 RX ADMIN — GABAPENTIN 300 MG: 300 CAPSULE ORAL at 08:02

## 2021-06-25 RX ADMIN — Medication 500 MG: at 11:05

## 2021-06-25 RX ADMIN — Medication 500 MG: at 17:05

## 2021-06-25 RX ADMIN — GABAPENTIN 300 MG: 300 CAPSULE ORAL at 15:02

## 2021-06-25 RX ADMIN — GABAPENTIN 300 MG: 300 CAPSULE ORAL at 22:03

## 2021-06-25 RX ADMIN — METRONIDAZOLE 500 MG: 500 INJECTION, SOLUTION INTRAVENOUS at 02:57

## 2021-06-25 RX ADMIN — METRONIDAZOLE 500 MG: 500 INJECTION, SOLUTION INTRAVENOUS at 17:06

## 2021-06-25 RX ADMIN — ACETAMINOPHEN 650 MG: 325 TABLET ORAL at 04:35

## 2021-06-25 RX ADMIN — HEPARIN SODIUM 5000 UNITS: 5000 INJECTION INTRAVENOUS; SUBCUTANEOUS at 14:59

## 2021-06-25 RX ADMIN — ATORVASTATIN CALCIUM 40 MG: 40 TABLET, FILM COATED ORAL at 17:05

## 2021-06-25 RX ADMIN — HEPARIN SODIUM 5000 UNITS: 5000 INJECTION INTRAVENOUS; SUBCUTANEOUS at 22:03

## 2021-06-25 NOTE — CASE MANAGEMENT
Discussed in rounds patient is not medically ready for dc  Sepsis with acute organ dysfunction  ? Megacolon  IV fluids and IV Umesh MANRIQUE indicated possibly tomorrow- recommendations for dc is home with OP PT  CM spoke to daughter at this time Hemalatha Cline) Pt is unsure he wants to go for OP PT  Will will provide prescription upon dc  Dc plan at this time is home, with OP PT

## 2021-06-25 NOTE — PROGRESS NOTES
Progress Note - Infectious Disease   Marysol Reyes 78 y o  male MRN: [id number]  Unit/Bed#: -01 Encounter: [id number]        REQUIRED DOCUMENTATION:     1  This service was provided via Telemedicine  2  Provider located at [facility]  3  TeleMed provider: Ricardo Mccoy MD   4  Identify all parties in room with patient during tele consult:RN  5  After connecting through [employer], patient was identified by name and date of birth and assistant checked wristband  Patient was then informed that this was a Telemedicine visit and that the exam was being conducted confidentially over secure lines  My office door was closed  No one else was in the room  Patient acknowledged consent and understanding of privacy and security of the Telemedicine visit, and gave us permission to have the assistant stay in the room in order to assist with the history and to conduct the exam   I informed the patient that I have reviewed their record in Epic and presented the opportunity for them to ask any questions regarding the visit today  The patient agreed to participate  Assessment/Recommendations     1  Sepsis, present on admission  - Source of sepsis is c diff colitis  - Clinically improving, afebrile this morning, hemodynamically stable and no leukocytosis     · Management as below     2   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  - C diff PCR is positive, EIA is negative but diagnosis suspected clinically and on imaging   - CT abdomen notes dilated (8x5cm max diameter) cecum   - Diarrhea is slowing improving,  abdominal exam is benign     · Continue vancomycin 500 mg qid and add metronidazole 500 iv q8h  · Close clinical monitoring with serial abdominal exams and monitor for any developing ileus  · Surgery recommends conservative management; if patient has any clinical worsening would recommend stat KUB and general surgery evaluation  · If patient continues to clinically improve will require a pulsed taper as follows:   PO Vancomycin 125 qid x 14 days   PO Vancomycin 125 bid x 7 days   PO Vancomycin 125 od x 7 days   PO Vancomycin 125 every other day x 6 weeks  · Recommend outpatient fu with GI   · Would strictly avoid systemic antibiotics unless clearly indicate; in future would recommend secondary c diff prophylaxis with antibiotics     3  CLL and hx chronic mild leukopenia  - Risk factor for immunosuppression but no evidence of disease progression     · Management per oncology     4  Acute kidney injury  - Likely pre renal   - creatinine improved from 2 48 -> 1 88 -> 1 52     · Continue supportive care    Discussed with the primary service  History       Subjective: The patient has no complaints  Yesterday he had episode of fecal incontinence due to urgency but now notes improving symptoms and less urgency, still has diarrhea, 2 loose stools charted today  No abominal pain, tolerating clears  Denies fevers, chills, or sweats  Denies nausea, vomiting, or diarrhea      Antibiotics:  PO Vanco - D3  Flagyl - D2      Physical Exam     Temp:  [97 6 °F (36 4 °C)-100 6 °F (38 1 °C)] 97 6 °F (36 4 °C)  HR:  [80-97] 93  Resp:  [16-20] 20  BP: (122-128)/(71-72) 128/71  SpO2:  [94 %-97 %] 96 %  Temp (24hrs), Av 1 °F (37 3 °C), Min:97 6 °F (36 4 °C), Max:100 6 °F (38 1 °C)  Current: Temperature: 97 6 °F (36 4 °C)    Intake/Output Summary (Last 24 hours) at 2021 1009  Last data filed at 2021 0941  Gross per 24 hour   Intake 3669 59 ml   Output 2516 ml   Net 1153 59 ml       Physical exam findings reported by bedside and primary medical team staff    General Appearance:  Appearing well, nontoxic, and in no distress, appears stated age   Head:  Normocephalic, without obvious abnormality, atraumatic   Eyes:  PERRL, conjunctiva pink and sclera anicteric, both eyes   Nose: Nares normal, mucosa normal, no drainage   Throat: Oropharynx moist without lesions; lips, mucosa, and tongue normal; teeth and gums normal   Neck: Supple, symmetrical, trachea midline, no adenopathy, no tenderness/mass/nodules   Back:   Symmetric, no curvature, ROM normal, no CVA tenderness   Lungs:   Clear to auscultation bilaterally, no audible wheezes, rhonchi and rales, respirations unlabored   Chest Wall:  No tenderness or deformity   Heart:  Regular rate and rhythm, S1, S2 normal, no murmur, rub or gallop   Abdomen:   Soft, mildly distended, bowel sounds hypoactive    No CVA tenderness   Extremities: Extremities normal, atraumatic, no cyanosis, clubbing or edema   Skin: Skin color, texture, turgor normal, no rashes or lesions  No draining wounds noted  Lymph nodes: Cervical, supraclavicular, and axillary nodes normal   Neurologic: Alert and oriented times 3, extremity strength 5/5 and symmetric       Invasive Devices:   Peripheral IV 06/23/21 Left Antecubital (Active)   Site Assessment WDL 06/25/21 0809   Dressing Type Transparent 06/25/21 0809   Line Status Saline locked; Infusing 06/25/21 0809   Dressing Status Intact;Dry;Clean 06/25/21 0809   Dressing Intervention Dressing changed 06/25/21 0809   Dressing Change Due 06/27/21 06/25/21 0809   Reason Not Rotated Not due 06/25/21 0809       Labs, Imaging, & Other Studies     Lab Results:    I have personally reviewed pertinent labs      Results from last 7 days   Lab Units 06/25/21  0447 06/24/21  0538 06/23/21  1748   WBC Thousand/uL 7 30 7 94 9 26   HEMOGLOBIN g/dL 11 9* 11 2* 12 7   PLATELETS Thousands/uL 107* 95* 114*     Results from last 7 days   Lab Units 06/25/21  0447 06/24/21  0538 06/23/21  1748   POTASSIUM mmol/L 4 0 4 4 4 8   CHLORIDE mmol/L 104 104 96*   CO2 mmol/L 20* 22 25   BUN mg/dL 20 35* 42*   CREATININE mg/dL 1 52* 1 88* 2 48*   EGFR ml/min/1 73sq m 42 32 23   CALCIUM mg/dL 7 7* 7 4* 8 0*   AST U/L 18 15 16   ALT U/L 23 23 26   ALK PHOS U/L 54 50 55     Results from last 7 days   Lab Units 06/23/21 2041 06/23/21  2018   BLOOD CULTURE   --  No Growth at 24 hrs  No Growth at 24 hrs  C DIFF TOXIN B BY PCR  Positive*  --        Imaging Studies:   I have personally reviewed pertinent imaging study reports and images in PACS  EKG, Pathology, and Other Studies:   I have personally reviewed pertinent reports  Counseling/Coordination of care: Total 30 minutes communication with the patient via telehealth  Labs, medical tests and imaging studies were independently reviewed by me as noted above

## 2021-06-25 NOTE — PLAN OF CARE
Problem: PHYSICAL THERAPY ADULT  Goal: Performs mobility at highest level of function for planned discharge setting  See evaluation for individualized goals  Description: Treatment/Interventions: Functional transfer training, LE strengthening/ROM, Elevations, Therapeutic exercise, Endurance training, Patient/family training, Equipment eval/education, Bed mobility, Gait training, Compensatory technique education, Spoke to nursing, Spoke to case management, OT  Equipment Recommended:  (walker-pt has )       See flowsheet documentation for full assessment, interventions and recommendations  2/[id number] by Latonia Tripp PT  Note: Prognosis: Good  Problem List: Decreased strength, Decreased endurance, Impaired balance, Decreased mobility, Decreased safety awareness  Assessment: Pt is a 78 y o  male seen for PT evaluation s/p admission to [address] [place] [address] on 6/23/2021 with Sepsis with acute organ dysfunction ([facility] [address] )  Order placed for PT services  Upon evaluation: Pt is presenting with impaired functional mobility due to decreased strength, decreased endurance, impaired balance, gait deviations, decreased safety awareness, impaired judgment, impaired hearing and fall risk requiring close supervision to stand by assistance for transfers and steadying to minimal assistance for ambulation with out AD  Pt's clinical presentation is currently unpredictable given the functional mobility deficits above, especially weakness, decreased endurance, gait deviations, decreased activity tolerance, decreased functional mobility tolerance, decreased safety awareness, impaired judgement and LOB, coupled with fall risks as indicated by AM-PAC 6-Clicks: 85/11 as well as impaired balance, polypharmacy and decreased safety awareness and combined with medical complications of abnormal renal lab values, abnormal H&H, abnormal sodium values, need for input for mobility technique/safety and KATELIN    Pt's PMHx and comorbidities that may affect physical performance and progress include: HTN and prostate cancer, chronic lymphocytic leukemia, h/o cellulitis  Personal factors affecting pt at time of IE include: step(s) to enter environment, multi-level environment, inability to perform IADLs, inability to perform ADLs, inability to navigate level surfaces without external assistance, inability to ambulate household distances and inability to navigate community distances  Pt will benefit from continued skilled PT services to address deficits as defined above and to maximize level of functional mobility to facilitate return toward PLOF and improved QOL  From PT/mobility standpoint, recommendation at time of d/c would be OP PT pending progress in order to reduce fall risk and maximize pt's functional independence and consistency with mobility in order to facilitate return to PLOF  Recommend trial with walker next 1-2 sessions, trial with cane next 1-2 sessions and ther ex next 1-2 sessions  Barriers to Discharge: None  Barriers to Discharge Comments: pt has support from spouse at home     PT Discharge Recommendation: Home with outpatient rehabilitation     PT - OK to Discharge:  (when medically cleared)    See flowsheet documentation for full assessment

## 2021-06-25 NOTE — PLAN OF CARE
Problem: Potential for Falls  Goal: Patient will remain free of falls  Description: INTERVENTIONS:  - Educate patient/family on patient safety including physical limitations  - Instruct patient to call for assistance with activity   - Consult OT/PT to assist with strengthening/mobility   - Keep Call bell within reach  - Keep bed low and locked with side rails adjusted as appropriate  - Keep care items and personal belongings within reach  - Initiate and maintain comfort rounds  - Make Fall Risk Sign visible to staff  - Offer Toileting every 2 Hours, in advance of need  - Initiate/Maintain bed and chair alarm  - Apply yellow socks and bracelet for high fall risk patients  - Consider moving patient to room near nurses station  Outcome: Progressing     Problem: PAIN - ADULT  Goal: Verbalizes/displays adequate comfort level or baseline comfort level  Description: Interventions:  - Encourage patient to monitor pain and request assistance  - Assess pain using appropriate pain scale  - Administer analgesics based on type and severity of pain and evaluate response  - Implement non-pharmacological measures as appropriate and evaluate response  - Consider cultural and social influences on pain and pain management  - Notify physician/advanced practitioner if interventions unsuccessful or patient reports new pain  Outcome: Progressing     Problem: SAFETY ADULT  Goal: Patient will remain free of falls  Description: INTERVENTIONS:  - Educate patient/family on patient safety including physical limitations  - Instruct patient to call for assistance with activity   - Consult OT/PT to assist with strengthening/mobility   - Keep Call bell within reach  - Keep bed low and locked with side rails adjusted as appropriate  - Keep care items and personal belongings within reach  - Initiate and maintain comfort rounds  - Make Fall Risk Sign visible to staff  - Offer Toileting every 2 Hours, in advance of need  - Initiate/Maintain bed and chair alarm  - Apply yellow socks and bracelet for high fall risk patients  - Consider moving patient to room near nurses station  Outcome: Progressing  Goal: Maintain or return to baseline ADL function  Description: INTERVENTIONS:  - Educate patient/family on patient safety including physical limitations  - Instruct patient to call for assistance with activity   - Consult OT/PT to assist with strengthening/mobility   - Keep Call bell within reach  - Keep bed low and locked with side rails adjusted as appropriate  - Keep care items and personal belongings within reach  - Initiate and maintain comfort rounds  - Make Fall Risk Sign visible to staff  - Offer Toileting every 2 Hours, in advance of need  - Initiate/Maintain bed and chair alarm  - Apply yellow socks and bracelet for high fall risk patients  - Consider moving patient to room near nurses station  Outcome: Progressing  Goal: Maintains/Returns to pre admission functional level  Description: INTERVENTIONS:  - Perform BMAT or MOVE assessment daily    - Set and communicate daily mobility goal to care team and patient/family/caregiver     - Out of bed for toileting  - Record patient progress and toleration of activity level   Outcome: Progressing     Problem: DISCHARGE PLANNING  Goal: Discharge to home or other facility with appropriate resources  Description: INTERVENTIONS:  - Identify barriers to discharge w/patient and caregiver  - Arrange for needed discharge resources and transportation as appropriate  - Identify discharge learning needs (meds, wound care, etc )  - Arrange for interpretive services to assist at discharge as needed  - Refer to Case Management Department for coordinating discharge planning if the patient needs post-hospital services based on physician/advanced practitioner order or complex needs related to functional status, cognitive ability, or social support system  Outcome: Progressing     Problem: Nutrition/Hydration-ADULT  Goal: Nutrient/Hydration intake appropriate for improving, restoring or maintaining nutritional needs  Description: Monitor and assess patient's nutrition/hydration status for malnutrition  Collaborate with interdisciplinary team and initiate plan and interventions as ordered  Monitor patient's weight and dietary intake as ordered or per policy  Utilize nutrition screening tool and intervene as necessary  Determine patient's food preferences and provide high-protein, high-caloric foods as appropriate       INTERVENTIONS:  - Monitor oral intake, urinary output, labs, and treatment plans  - Assess nutrition and hydration status and recommend course of action  - Evaluate amount of meals eaten  - Assist patient with eating if necessary   - Allow adequate time for meals  - Recommend/ encourage appropriate diets, oral nutritional supplements, and vitamin/mineral supplements  - Order, calculate, and assess calorie counts as needed  - Recommend, monitor, and adjust tube feedings and TPN/PPN based on assessed needs  - Assess need for intravenous fluids  - Provide specific nutrition/hydration education as appropriate  - Include patient/family/caregiver in decisions related to nutrition  Outcome: Progressing

## 2021-06-25 NOTE — PHYSICAL THERAPY NOTE
PHYSICAL THERAPY EVALUATION  NAME:  Katey Thomson  DATE: 06/25/21    AGE:   78 y o  Mrn:   [id number]  ADMIT DX:  Diarrhea [R19 7]  Hyponatremia [E87 1]  KATELIN (acute kidney injury) ([facility] [address] ) [N17 9]  Diarrhea, unspecified type [R19 7]    Past Medical History:   Diagnosis Date    Hypertension     Prostate cancer ([facility] )      Length Of Stay: 2  Performed at least 2 patient identifiers during session: Name and Birthday  PHYSICAL THERAPY EVALUATION :      06/25/21 0725   PT Last Visit   PT Visit Date 06/25/21   Note Type   Note type Evaluation   Pain Assessment   Pain Assessment Tool 0-10   Pain Score No Pain   Home Living   Type of Home House  (4-5 HAILY R HR)   Home Layout Two level;Bed/bath upstairs  (FF R HR)   Bathroom Shower/Tub Tub/shower unit   Bathroom Toilet Raised   Bathroom Equipment Grab bars in [facility]  (has, but didn't really use PTA  used recently prn)   Additional Comments Reports living in a 2Sh with 4-5 HAILY R HR and FF with R HR  Has a walker, but wasn't using PTA  Prior Function   Level of [place] Independent with ADLs and functional mobility   Lives With Spouse   Receives Help From Family   ADL Assistance Independent   IADLs Needs assistance  (+ driving)   Falls in the last 6 months 0   Comments Reports being independent with mobility and ADLs without AD, but has assistance from spouse for IADLs  Restrictions/Precautions   Other Precautions Chair Alarm; Bed Alarm; Fall Risk;Multiple lines;Contact/isolation  (strict hand hygiene)   General   Additional Pertinent History Pt + C diff   Cognition   Orientation Level Oriented X4   RLE Assessment   RLE Assessment WFL  (4/5)   LLE Assessment   LLE Assessment WFL  (4/5)   Coordination   Movements are Fluid and Coordinated 1  (alt toe tapping)   Light Touch   RLE Light Touch Grossly intact   LLE Light Touch Grossly intact   Bed Mobility   Additional Comments Pt transferring OOB to recliner chair upon arrival to room without using AD     Transfers   Sit to Stand   (close supervision)   Additional items Assist x 1; Increased time required;Verbal cues   Stand to Sit   (close supervision)   Additional items Assist x 1; Increased time required;Verbal cues   Stand pivot   (stand by assistance)   Additional items Assist x 1; Increased time required;Verbal cues   Additional Comments no AD  sit<>stand with close supervision  spt without AD with stand by assistance with pt guarded reaching for external support   Ambulation/Elevation   Gait pattern Improper Weight shift; Excessively slow; Short stride  (gait deviation to left with 1 LOB wiht mINAx1 to recover)   Gait Assistance   (steadying assistance; LOB to left 1x with minAx1)   Additional items Assist x 1;Verbal cues   Assistive Device None   Distance 25'x2 without AD with slow elysia, wide DALTON, decreased arm swing, reaching for external support  increased path deviation to left and LOB 1x requiring miNAx1 to recover  Balance   Static Sitting Good   Dynamic Sitting Fair +   Static Standing Fair   Dynamic Standing Fair -   Ambulatory Poor +   Activity Tolerance   Activity Tolerance Patient limited by fatigue   Medical Staff Made Aware Nasrin DE LA CRUZ   Nurse Made Aware RN, Chelle   Assessment   Prognosis Good   Problem List Decreased strength;Decreased endurance; Impaired balance;Decreased mobility; Decreased safety awareness   Barriers to Discharge None   Barriers to Discharge Comments pt has support from spouse at home   Goals   Patient Goals "Go home"   STG Expiration Date 07/05/21   PT Treatment Day 1   Plan   Treatment/Interventions Functional transfer training;LE strengthening/ROM; Elevations; Therapeutic exercise; Endurance training;Patient/family training;Equipment eval/education; Bed mobility;Gait training; Compensatory technique education;Spoke to nursing;Spoke to case management;OT   PT Frequency 2-3x/wk   Recommendation   PT Discharge Recommendation Home with outpatient rehabilitation   Equipment Recommended   (walker-pt has )   PT - OK to Discharge   (when medically cleared)   Additional Comments pt sitting in recliner chair with all needs wihtin reach and posey alarm on at end of session   Te 8 in Bed Without Bedrails 4   Lying on Back to Sitting on Edge of Flat Bed 4   Moving Bed to Chair 3   Standing Up From Chair 3   Walk in Room 3   Climb 3-5 Stairs 3   Basic Mobility Inpatient Raw Score 20   Basic Mobility Standardized Score 43 99     (Please find full objective findings from PT assessment regarding body systems outlined above)  Assessment: Pt is a 78 y o  male seen for PT evaluation s/p admission to [address] [place] [address] on 6/23/2021 with Sepsis with acute organ dysfunction ([facility] [address] )  Order placed for PT services  Upon evaluation: Pt is presenting with impaired functional mobility due to decreased strength, decreased endurance, impaired balance, gait deviations, decreased safety awareness, impaired judgment, impaired hearing and fall risk requiring close supervision to stand by assistance for transfers and steadying to minimal assistance for ambulation with out AD  Pt's clinical presentation is currently unpredictable given the functional mobility deficits above, especially weakness, decreased endurance, gait deviations, decreased activity tolerance, decreased functional mobility tolerance, decreased safety awareness, impaired judgement and LOB, coupled with fall risks as indicated by AM-PAC 6-Clicks: 69/33 as well as impaired balance, polypharmacy and decreased safety awareness and combined with medical complications of abnormal renal lab values, abnormal H&H, abnormal sodium values, need for input for mobility technique/safety and KATELIN  Pt's PMHx and comorbidities that may affect physical performance and progress include: HTN and prostate cancer, chronic lymphocytic leukemia, h/o cellulitis   Personal factors affecting pt at time of IE include: step(s) to enter environment, multi-level environment, inability to perform IADLs, inability to perform ADLs, inability to navigate level surfaces without external assistance, inability to ambulate household distances and inability to navigate community distances  Pt will benefit from continued skilled PT services to address deficits as defined above and to maximize level of functional mobility to facilitate return toward PLOF and improved QOL  From PT/mobility standpoint, recommendation at time of d/c would be OP PT pending progress in order to reduce fall risk and maximize pt's functional independence and consistency with mobility in order to facilitate return to PLOF  Recommend trial with walker next 1-2 sessions, trial with cane next 1-2 sessions and ther ex next 1-2 sessions  The patient's AM-PAC Basic Mobility Inpatient Short Form Raw Score is 20, Standardized Score is 43 99  A standardized score greater than 42 9 suggests the patient may benefit from discharge to home  Please also refer to the recommendation of the Physical Therapist for safe discharge planning  Goals: Pt will: Perform bed mobility tasks with modified I to reposition in bed and prepare for transfers  Pt will perform transfers with modified I to decrease burden of care, decrease risk for falls and improve activity tolerance and prepare for ambulation  Pt will ambulate with LRAD for >/= 250' with  modified I  to decrease burden of care, decrease risk for falls, improve activity tolerance and improve gait quality and to access home environment  Pt will complete >/= 12 steps with with unilateral handrail with modified I to return to home with HAILY, return to multilevel home, decrease risk for falls and improve activity tolerance  Pt will demonstrate decreased risk for falls as indicated by score of >/= 10/12 on 4 item DGI  Pt will increase B LE strength >/= 1/2 MMT grade to facilitate functional mobility        Venson Boeck, PT,DPT     PHYSICAL THERAPY TREATMENT NOTE  Time In:0740  Time Q:4362  Total Time: 13'      S:  "I have 2 walkers at home "  O:  Initiated use of spc as patient was agreeable to trial  After trial, reported he feels the walker is easier  Sit<>stand with spc with supervision  Spt with spc with stand by assistance    Ambulated 115'x2 with spc with stand by assistance with slow elysia with min verbal cues for upright posture, increased elysia and step length as well as sequencing with spc  Completed 5 steps with R HR and spc with supervision step to pattern with increased time  4 Item Dynamic Gait Index with spc  2/3 Gait level surface  1/3 Change in gait speed  1/3 Gait with horizontal head turns  1/3 Gait with vertical head turns  5/12 total score (less than 10/12 indicates increased risk of falls in elderly)    trialed RW  Sit<>stand with RW with supervision with min verbal cues for hand placement for safety with RW  Ambulated 40'x1 with RW with supervision with decreased step length and slow elysia  A:  Pt requires increased time to complete mobility  He ambulated with significantly slowed elysia with increased path deviation with spc requiring stand by assistance  He demonstrated increased fall risk with spc as indicated by score of 5/12 on 4 item DGI  He prefers use of RW  He ambulated with supervision with RW and improved balance compared to spc  Pt would benefit form continued use of RW upon D C  He requires increased verbal cues for improved gait pattern  He is limited by decreased strength, balance, endurance  He will continue to benefit from PT services to maximize LOF  P:  Recommend LE strengthening, dynamic standing balance, endurance training, gait training with LRAD, stairs       Sharyn Ceballos, PT, DPT

## 2021-06-25 NOTE — PROGRESS NOTES
[address]  Progress Note Ho Rivera 1940, 78 y o  male MRN: [id number]  Unit/Bed#: -01 Encounter: [id number]  Primary Care Provider: Candelaria Knowles DO   Date and time admitted to hospital: 6/23/2021  5:30 PM    KATELIN (acute kidney injury) [facility])  Assessment & Plan  Most likely secondary to dehydration and suspected C diff colitis  Baseline creatinine is 1 58  Improved, today creatinine is 1 52    * Sepsis with acute organ dysfunction [facility])  Assessment & Plan  Sepsis with KATELIN  Patient   Mid the criteria with fever, tachycardic, tachypneic-also having diarrhea, suspected infectious in nature based on patient previous history of C diff and recent history of antibiotic use  Follow lactic acid, procalcitonin,   Blood culture shows no growth  Enteric panel negative, C diff PCR positive, Nicole negative  Continue p o  Vancomycin,  IV Flagyl  Continue IV hydration   appreciate ID recommendation    Abnormal CT of the abdomen  Assessment & Plan  The colon demonstrates mural thickening and loss of haustration throughout its length; there is dilatation of the cecum and proximal ascending colon  These findings are consistent with the history of C  difficile colitis  Findings in the cecum and   proximal ascending colon may represent early/developing toxic megacolon; maximum diameter measures 8 x 5 cm  [facility] general surgery consulted, recommends conservative management  Clinically patient does not look toxic- general surgery agrees with clear liquid diet and p o  medication    Clinically remained stable    Hyponatremia  Assessment & Plan  Most likely hypovolemic hyponatremia secondary to diarrhea   on admissionSodium level is 128,  Today sodium level is 135  Continue IV hydration  Continue to monitor    Diarrhea of infectious origin  Assessment & Plan  Most likely infectious  Patient has recent history of antibiotic use for cellulitis and diarrhea started after a started the antibiotic  Patient does have recent history of C diff colitis  Will start p o  Vancomycin, added IV  Flagyl as per ID recommendation  Intake panel negative, C diff PCR positive, EIA negative  Continue IV fluid    Pulmonary nodule  Assessment & Plan  6 x 4 mm nodule in the right middle lobe (2:3)  4 mm nodule right middle lobe (2:3)  Right lower lobe 6 x 7 mm nodule (2:2)  These are similar to the prior exam from 3/4/2021     patient will need outpatient follow-up    CLL (chronic lymphocytic leukemia) [facility])  Assessment & Plan  Patient follows with oncologist    Essential hypertension  Assessment & Plan  Patient was on lisinopril  Due to KATELIN on hold  In the meantime use amlodipine-once KATELIN resolved, consider to restart lisinopril and DC amlodipine    Thrombocytopenia (HCC)  Assessment & Plan  Platelet count dropped to 95 K, no active bleeding noted  Today platelet is 053        VTE Pharmacologic Prophylaxis:   Pharmacologic: Heparin  Mechanical VTE Prophylaxis in Place: Yes    Patient Centered Rounds: I have performed bedside rounds with nursing staff today  Discussions with Specialists or Other Care Team Provider:  General surgery, Infectious Disease    Education and Discussions with Family / Patient:  Yes with patient and his wife on the bedside    Time Spent for Care: 15 minutes  More than 50% of total time spent on counseling and coordination of care as described above  Current Length of Stay: 2 day(s)    Current Patient Status: Inpatient   Certification Statement: The patient will continue to require additional inpatient hospital stay due to Sepsis, infectious diarrhea    Discharge Plan / Estimated Discharge Date: To be determined      Code Status: Level 1 - Full Code      Subjective:   Seen and evaluated during the round  Patient reports his feeling much better had 2 episode of diarrhea  Denies any abdominal pain, fever      Objective:     Vitals:   Temp (24hrs), Av 1 °F (37 3 °C), Min:97 6 °F (36 4 °C), Max:100 6 °F (38 1 °C)    Temp:  [97 6 °F (36 4 °C)-100 6 °F (38 1 °C)] 97 6 °F (36 4 °C)  HR:  [80-97] 93  Resp:  [16-20] 20  BP: (122-128)/(71-72) 128/71  SpO2:  [94 %-97 %] 96 %  Body mass index is 27 79 kg/m²  Input and Output Summary (last 24 hours): Intake/Output Summary (Last 24 hours) at 6/25/2021 1437  Last data filed at 6/25/2021 1300  Gross per 24 hour   Intake 3569 59 ml   Output 2016 ml   Net 1553 59 ml       Physical Exam:     Physical Exam  Vitals and nursing note reviewed  HENT:      Head: Normocephalic  Nose: Nose normal  No congestion  Mouth/Throat:      Mouth: Mucous membranes are moist    Eyes:      General: No scleral icterus  Pupils: Pupils are equal, round, and reactive to light  Cardiovascular:      Rate and Rhythm: Normal rate  Heart sounds: No gallop  Pulmonary:      Effort: Pulmonary effort is normal  No respiratory distress  Breath sounds: No stridor  No wheezing or rhonchi  Abdominal:      General: Abdomen is flat  Bowel sounds are normal  There is no distension  Palpations: There is no mass  Tenderness: There is no abdominal tenderness  There is no right CVA tenderness or guarding  Hernia: No hernia is present  Musculoskeletal:         General: Normal range of motion  Cervical back: Normal range of motion  Right lower leg: No edema  Left lower leg: No edema  Lymphadenopathy:      Cervical: No cervical adenopathy  Skin:     General: Skin is warm  Capillary Refill: Capillary refill takes less than 2 seconds  Findings: No lesion  Neurological:      General: No focal deficit present  Mental Status: He is alert and oriented to person, place, and time  Cranial Nerves: No cranial nerve deficit  Sensory: No sensory deficit  Motor: No weakness        Coordination: Coordination normal        Additional Data:     Labs:    Results from last 7 days   Lab Units 06/25/21  0447   WBC Thousand/uL 7 30   HEMOGLOBIN g/dL 11 9*   HEMATOCRIT % 35 7*   PLATELETS Thousands/uL 107*   NEUTROS PCT % 55   LYMPHS PCT % 26   MONOS PCT % 18*   EOS PCT % 0     Results from last 7 days   Lab Units 06/25/21  0447   POTASSIUM mmol/L 4 0   CHLORIDE mmol/L 104   CO2 mmol/L 20*   BUN mg/dL 20   CREATININE mg/dL 1 52*   CALCIUM mg/dL 7 7*   ALK PHOS U/L 54   ALT U/L 23   AST U/L 18           * I Have Reviewed All Lab Data Listed Above  * Additional Pertinent Lab Tests Reviewed: All Labs Within Last 24 Hours Reviewed        Recent Cultures (last 7 days):     Results from last 7 days   Lab Units 06/23/21 2041 06/23/21 2018   BLOOD CULTURE   --  No Growth at 24 hrs  No Growth at 24 hrs  C DIFF TOXIN B BY PCR  Positive*  --        Last 24 Hours Medication List:   Current Facility-Administered Medications   Medication Dose Route Frequency Provider Last Rate    acetaminophen  650 mg Oral Q6H PRN Jonel Martell MD      atorvastatin  40 mg Oral Daily With Stormy Maier MD      cholecalciferol  1,000 Units Oral Daily Jonel Martell MD      gabapentin  300 mg Oral TID Jonel Martell MD      heparin (porcine)  5,000 Units Subcutaneous [facility] Monica Grigsby MD      metroNIDAZOLE  500 mg Intravenous Q8H Mark Grigsby  mg (06/25/21 1100)    multi-electrolyte  75 mL/hr Intravenous Continuous Jonel Martell  mL/hr (06/25/21 0941)    ondansetron  4 mg Intravenous Q4H PRN Jonel Martell MD      vancomycin  500 mg Oral Q6H Ahsan Welch MD          Today, Patient Was Seen By: Jonel Martell MD    ** Please Note: Dragon 360 Dictation voice to text software may have been used in the creation of this document   **

## 2021-06-25 NOTE — OCCUPATIONAL THERAPY NOTE
Occupational Therapy Evaluation     Patient Name: Carmella Aburto  AUWVX'R Date: 6/25/2021  Problem List  Principal Problem:    Sepsis with acute organ dysfunction [facility])  Active Problems: Thrombocytopenia ([facility] [address] )    Essential hypertension    CLL (chronic lymphocytic leukemia) (HCC)    Diarrhea of infectious origin    KATELIN (acute kidney injury) ([facility] [address] )    Hyponatremia    Abnormal CT of the abdomen    Pulmonary nodule    Past Medical History  Past Medical History:   Diagnosis Date    Hypertension     Prostate cancer [facility])      Past Surgical History  Past Surgical History:   Procedure Laterality Date    APPENDECTOMY      BACK SURGERY  2016    CATARACT EXTRACTION  2020    PROSTATE SURGERY             06/25/21 0726   Note Type   Note type Evaluation   Restrictions/Precautions   Other Precautions Chair Alarm; Bed Alarm;Multiple lines   Pain Assessment   Pain Assessment Tool 0-10   Pain Score No Pain   Home Living   Type of Home House  (4-5 HAILY R HR)   Home Layout Two level;Bed/bath upstairs  (FF R HR )   Bathroom Shower/Tub Tub/shower unit   Bathroom Toilet Raised   Bathroom Equipment Grab bars in [facility]  (started using RW recently)   Additional Comments Pt reprots living in a 2 story home with 4-5 HAILY R HR  Pt ambulates with RW most recently but typically uses no AD  Pt has no bathroom on 1st floor   Prior Function   Level of [place] Independent with ADLs and functional mobility   Lives With Spouse   Receives Help From Family   ADL Assistance Independent   IADLs Independent  ("my wife does most the cooking and cleaning")   Falls in the last 6 months 0   Comments Pt reports completing ADLs, IADLs, functional ambulatino and community mobility + driving @ I   Pt reprots his wife completes most the cooking and cleaning although he can help    ADL   Where Assessed Chair   Grooming Assistance 5  Supervision/Setup  (standing at sinkside)   Grooming Deficit Supervision/safety   UB Dressing Assistance 7  Independent   UB Dressing Deficit Setup   LB Dressing Assistance 5  Supervision/Setup   LB Dressing Deficit Requires assistive device for steadying;Verbal cueing;Supervision/safety; Don/doff R sock; Don/doff L sock; Thread RLE into pants; Thread LLE into pants;Pull up over hips   Toileting Assistance  5  Supervision/Setup   Toileting Deficit Verbal cueing;Supervison/safety; Increased time to complete;Grab bar use;Clothing management up;Clothing management down;Perineal hygiene   Additional Comments Pt completing ADL tasks while seated OOB in recliner chair  UB Dressing @ I after set-up  LB dressing, toileting tasks, and grooming at sinkside completed @ S with UB support from RW PRN  Pt completin sock donning and CM around waist with no LOB noted  Bed Mobility   Additional Comments Pt seated OOB upon start of session and OOB in recliner chair at end of session with call bell in reach, chair alarm intact and all needs met at this time  Transfers   Sit to Stand 5  Supervision   Additional items Verbal cues   Stand to Sit 5  Supervision   Additional items Verbal cues   Stand pivot 5  Supervision   Additional items Increased time required;Verbal cues   Additional Comments Pt completing functional trnasfers with use of RW for UB support  S for safety and vc'ing for technique PRN   Balance   Static Sitting Normal   Dynamic Sitting Good   Static Standing Fair +   Dynamic Standing Fair   Activity Tolerance   Activity Tolerance Patient limited by fatigue;Patient tolerated treatment well   Medical Staff Made Aware Spoke with PT, Micha Gannon   Nurse Made Aware Spoke with RN   RUE Assessment   RUE Assessment WFL   LUE Assessment   LUE Assessment WFL   Hand Function   Gross Motor Coordination Functional   Fine Motor Coordination Functional   Sensation   Light Touch No apparent deficits   Cognition   Overall Cognitive Status WFL   Arousal/Participation Alert; Responsive; Cooperative   Attention Attends with cues to redirect Orientation Level Oriented X4   Memory Within functional limits   Following Commands Follows one step commands without difficulty   Assessment   Limitation Decreased endurance   Prognosis Good   Assessment Pt is an 77 y/o M admitted to [address] [place] [address] 6/23/2021 d/t experiencing increased weakness and diarrhea  Dx: sepsis with acute organ failure, C-Diff  Pt with PMHx impacting performance during functional tasks including: HTN, prostate CA  Pt reports living in a 2 story home with 4-5 HAILY R HR  Pt ambulates with no AD at baseline although recently has been utilizing a RW at home  Pt reports completing ADLs, light IADLs, functional ambulation and community mobility + driving @ I  Pt has assistance from his wife PRN  On evaluation, Pt A&Ox4  Pt completing UB dressing @ I  LB dressing, toileting tasks and grooming tasks @ S with use of RW for UB support  Pt completing functional transfers with use of RW for UB support @ S  Pt BUE ROM and MS WFL  Pt's vitals remained stable throughout therapy session on RA  Pt asymptomatic during activity other than fatigue during long distance ambulation  Pt's barriers to d/c include: decrease activity tolerance and decrease generalized strength  At this time, Pt's performance during ADLs and functional tasks is at [facility]  Pt's currently deficits are to be addressed by Physical Therapy  No further OT services are required at this time  D/c OT effective 6/25/2021  If new concerns arise, please re-consult      Plan   OT Frequency Eval only   Recommendation   Recommendation   (no OT needs)   OT Discharge Recommendation No rehabilitation needs   AM-PAC Daily Activity Inpatient   Lower Body Dressing 3   Bathing 3   Toileting 3   Upper Body Dressing 4   Grooming 3   Eating 4   Daily Activity Raw Score 20   Daily Activity Standardized Score (Calc for Raw Score >=11) 42 03   AM-PAC Applied Cognition Inpatient   Following a Speech/Presentation 4   Understanding Ordinary Conversation 4   Taking Medications 4 Remembering Where Things Are Placed or Put Away 4   Remembering List of 4-5 Errands 4   Taking Care of Complicated Tasks 4   Applied Cognition Raw Score 24   Applied Cognition Standardized Score 62 21     The patient's raw score on the AM-PAC Daily Activity inpatient short form is 20, standardized score is 42 03, greater than 39 4  Patients at this level are likely to benefit from DC to home  Please refer to the recommendation of the Occupational Therapist for safe DC planning        Sejal Armstrong OTR/L

## 2021-06-25 NOTE — ASSESSMENT & PLAN NOTE
Most likely secondary to dehydration and suspected C diff colitis  Baseline creatinine is 1 58  Improved, today creatinine is 1 52

## 2021-06-25 NOTE — ASSESSMENT & PLAN NOTE
The colon demonstrates mural thickening and loss of haustration throughout its length; there is dilatation of the cecum and proximal ascending colon  These findings are consistent with the history of C  difficile colitis  Findings in the cecum and   proximal ascending colon may represent early/developing toxic megacolon; maximum diameter measures 8 x 5 cm  [facility] general surgery consulted, recommends conservative management  Clinically patient does not look toxic- general surgery agrees with clear liquid diet and p o  medication    Clinically remained stable

## 2021-06-25 NOTE — ASSESSMENT & PLAN NOTE
Most likely infectious  Patient has recent history of antibiotic use for cellulitis and diarrhea started after a started the antibiotic  Patient does have recent history of C diff colitis  Will start p o   Vancomycin, added IV  Flagyl as per ID recommendation  Intake panel negative, C diff PCR positive, EIA negative  Continue IV fluid

## 2021-06-25 NOTE — PLAN OF CARE
Problem: PHYSICAL THERAPY ADULT  Goal: Performs mobility at highest level of function for planned discharge setting  See evaluation for individualized goals  Description: Treatment/Interventions: Functional transfer training, LE strengthening/ROM, Elevations, Therapeutic exercise, Endurance training, Patient/family training, Equipment eval/education, Bed mobility, Gait training, Compensatory technique education, Spoke to nursing, Spoke to case management, OT  Equipment Recommended:  (walker-pt has )       See flowsheet documentation for full assessment, interventions and recommendations  [id number] by Samuel Rodriguez PT  Note: Prognosis: Good  Problem List: Decreased strength, Decreased endurance, Impaired balance, Decreased mobility, Decreased safety awareness  Pt requires increased time to complete mobility  He ambulated with significantly slowed elysia with increased path deviation with spc requiring stand by assistance  He demonstrated increased fall risk with spc as indicated by score of 5/12 on 4 item DGI  He prefers use of RW  He ambulated with supervision with RW and improved balance compared to spc  Pt would benefit form continued use of RW upon D C  He requires increased verbal cues for improved gait pattern  He is limited by decreased strength, balance, endurance  He will continue to benefit from PT services to maximize LOF  Barriers to Discharge: None  Barriers to Discharge Comments: pt has support from spouse at home     PT Discharge Recommendation: Home with outpatient rehabilitation     PT - OK to Discharge:  (when medically cleared)    See flowsheet documentation for full assessment

## 2021-06-25 NOTE — ASSESSMENT & PLAN NOTE
Sepsis with KATELIN  Patient   Mid the criteria with fever, tachycardic, tachypneic-also having diarrhea, suspected infectious in nature based on patient previous history of C diff and recent history of antibiotic use  Follow lactic acid, procalcitonin,   Blood culture shows no growth  Enteric panel negative, C diff PCR positive, Nicole negative  Continue p o   Vancomycin,  IV Flagyl  Continue IV hydration   appreciate ID recommendation

## 2021-06-25 NOTE — PLAN OF CARE
Problem: Potential for Falls  Goal: Patient will remain free of falls  Description: INTERVENTIONS:  - Educate patient/family on patient safety including physical limitations  - Instruct patient to call for assistance with activity   - Consult OT/PT to assist with strengthening/mobility   - Keep Call bell within reach  - Keep bed low and locked with side rails adjusted as appropriate  - Keep care items and personal belongings within reach  - Initiate and maintain comfort rounds  - Make Fall Risk Sign visible to staff  - Offer Toileting every 2 Hours, in advance of need  - Initiate/Maintain bed and chair alarm  - Apply yellow socks and bracelet for high fall risk patients  - Consider moving patient to room near nurses station  Outcome: Progressing     Problem: PAIN - ADULT  Goal: Verbalizes/displays adequate comfort level or baseline comfort level  Description: Interventions:  - Encourage patient to monitor pain and request assistance  - Assess pain using appropriate pain scale  - Administer analgesics based on type and severity of pain and evaluate response  - Implement non-pharmacological measures as appropriate and evaluate response  - Consider cultural and social influences on pain and pain management  - Notify physician/advanced practitioner if interventions unsuccessful or patient reports new pain  Outcome: Progressing     Problem: SAFETY ADULT  Goal: Patient will remain free of falls  Description: INTERVENTIONS:  - Educate patient/family on patient safety including physical limitations  - Instruct patient to call for assistance with activity   - Consult OT/PT to assist with strengthening/mobility   - Keep Call bell within reach  - Keep bed low and locked with side rails adjusted as appropriate  - Keep care items and personal belongings within reach  - Initiate and maintain comfort rounds  - Make Fall Risk Sign visible to staff  - Offer Toileting every 2 Hours, in advance of need  - Initiate/Maintain bed and chair alarm  - Apply yellow socks and bracelet for high fall risk patients  - Consider moving patient to room near nurses station  Outcome: Progressing  Goal: Maintain or return to baseline ADL function  Description: INTERVENTIONS:  - Educate patient/family on patient safety including physical limitations  - Instruct patient to call for assistance with activity   - Consult OT/PT to assist with strengthening/mobility   - Keep Call bell within reach  - Keep bed low and locked with side rails adjusted as appropriate  - Keep care items and personal belongings within reach  - Initiate and maintain comfort rounds  - Make Fall Risk Sign visible to staff  - Offer Toileting every 2 Hours, in advance of need  - Initiate/Maintain bed and chair alarm  - Apply yellow socks and bracelet for high fall risk patients  - Consider moving patient to room near nurses station  Outcome: Progressing  Goal: Maintains/Returns to pre admission functional level  Description: INTERVENTIONS:  - Perform BMAT or MOVE assessment daily    - Set and communicate daily mobility goal to care team and patient/family/caregiver  - Collaborate with rehabilitation services on mobility goals if consulted  - Reposition patient every 2 hours    - Stand patient 3-4 times a day  - Ambulate patient 3-4times a day  - Out of bed to chair 3-4times a day   - Out of bed for meals 3times a day  - Out of bed for toileting  - Record patient progress and toleration of activity level   Outcome: Progressing     Problem: DISCHARGE PLANNING  Goal: Discharge to home or other facility with appropriate resources  Description: INTERVENTIONS:  - Identify barriers to discharge w/patient and caregiver  - Arrange for needed discharge resources and transportation as appropriate  - Identify discharge learning needs (meds, wound care, etc )  - Arrange for interpretive services to assist at discharge as needed  - Refer to Case Management Department for coordinating discharge planning if the patient needs post-hospital services based on physician/advanced practitioner order or complex needs related to functional status, cognitive ability, or social support system  Outcome: Progressing     Problem: Nutrition/Hydration-ADULT  Goal: Nutrient/Hydration intake appropriate for improving, restoring or maintaining nutritional needs  Description: Monitor and assess patient's nutrition/hydration status for malnutrition  Collaborate with interdisciplinary team and initiate plan and interventions as ordered  Monitor patient's weight and dietary intake as ordered or per policy  Utilize nutrition screening tool and intervene as necessary  Determine patient's food preferences and provide high-protein, high-caloric foods as appropriate       INTERVENTIONS:  - Monitor oral intake, urinary output, labs, and treatment plans  - Assess nutrition and hydration status and recommend course of action  - Evaluate amount of meals eaten  - Assist patient with eating if necessary   - Allow adequate time for meals  - Recommend/ encourage appropriate diets, oral nutritional supplements, and vitamin/mineral supplements  - Order, calculate, and assess calorie counts as needed  - Recommend, monitor, and adjust tube feedings and TPN/PPN based on assessed needs  - Assess need for intravenous fluids  - Provide specific nutrition/hydration education as appropriate  - Include patient/family/caregiver in decisions related to nutrition  Outcome: Progressing

## 2021-06-26 VITALS
SYSTOLIC BLOOD PRESSURE: 131 MMHG | TEMPERATURE: 97.8 F | OXYGEN SATURATION: 94 % | BODY MASS INDEX: 27.95 KG/M2 | DIASTOLIC BLOOD PRESSURE: 84 MMHG | RESPIRATION RATE: 20 BRPM | HEART RATE: 87 BPM | HEIGHT: 68 IN | WEIGHT: 184.4 LBS

## 2021-06-26 LAB
ALBUMIN SERPL BCP-MCNC: 2.2 G/DL (ref 3.5–5)
ALP SERPL-CCNC: 41 U/L (ref 46–116)
ALT SERPL W P-5'-P-CCNC: 20 U/L (ref 12–78)
ANION GAP SERPL CALCULATED.3IONS-SCNC: 6 MMOL/L (ref 4–13)
AST SERPL W P-5'-P-CCNC: 17 U/L (ref 5–45)
BASOPHILS # BLD AUTO: 0.01 THOUSANDS/ΜL (ref 0–0.1)
BASOPHILS NFR BLD AUTO: 0 % (ref 0–1)
BILIRUB SERPL-MCNC: 0.29 MG/DL (ref 0.2–1)
BUN SERPL-MCNC: 13 MG/DL (ref 5–25)
CALCIUM ALBUM COR SERPL-MCNC: 8.7 MG/DL (ref 8.3–10.1)
CALCIUM SERPL-MCNC: 7.3 MG/DL (ref 8.3–10.1)
CHLORIDE SERPL-SCNC: 106 MMOL/L (ref 100–108)
CO2 SERPL-SCNC: 24 MMOL/L (ref 21–32)
CREAT SERPL-MCNC: 1.27 MG/DL (ref 0.6–1.3)
EOSINOPHIL # BLD AUTO: 0.01 THOUSAND/ΜL (ref 0–0.61)
EOSINOPHIL NFR BLD AUTO: 0 % (ref 0–6)
ERYTHROCYTE [DISTWIDTH] IN BLOOD BY AUTOMATED COUNT: 13.8 % (ref 11.6–15.1)
GFR SERPL CREATININE-BSD FRML MDRD: 52 ML/MIN/1.73SQ M
GLUCOSE SERPL-MCNC: 98 MG/DL (ref 65–140)
HCT VFR BLD AUTO: 31.4 % (ref 36.5–49.3)
HGB BLD-MCNC: 10.5 G/DL (ref 12–17)
IMM GRANULOCYTES # BLD AUTO: 0.03 THOUSAND/UL (ref 0–0.2)
IMM GRANULOCYTES NFR BLD AUTO: 1 % (ref 0–2)
LYMPHOCYTES # BLD AUTO: 1.53 THOUSANDS/ΜL (ref 0.6–4.47)
LYMPHOCYTES NFR BLD AUTO: 39 % (ref 14–44)
MCH RBC QN AUTO: 31.6 PG (ref 26.8–34.3)
MCHC RBC AUTO-ENTMCNC: 33.4 G/DL (ref 31.4–37.4)
MCV RBC AUTO: 95 FL (ref 82–98)
MONOCYTES # BLD AUTO: 0.77 THOUSAND/ΜL (ref 0.17–1.22)
MONOCYTES NFR BLD AUTO: 19 % (ref 4–12)
NEUTROPHILS # BLD AUTO: 1.61 THOUSANDS/ΜL (ref 1.85–7.62)
NEUTS SEG NFR BLD AUTO: 41 % (ref 43–75)
NRBC BLD AUTO-RTO: 0 /100 WBCS
PLATELET # BLD AUTO: 109 THOUSANDS/UL (ref 149–390)
PMV BLD AUTO: 10.3 FL (ref 8.9–12.7)
POTASSIUM SERPL-SCNC: 4.2 MMOL/L (ref 3.5–5.3)
PROT SERPL-MCNC: 5 G/DL (ref 6.4–8.2)
RBC # BLD AUTO: 3.32 MILLION/UL (ref 3.88–5.62)
SODIUM SERPL-SCNC: 136 MMOL/L (ref 136–145)
WBC # BLD AUTO: 3.96 THOUSAND/UL (ref 4.31–10.16)

## 2021-06-26 PROCEDURE — 85025 COMPLETE CBC W/AUTO DIFF WBC: CPT | Performed by: FAMILY MEDICINE

## 2021-06-26 PROCEDURE — 99239 HOSP IP/OBS DSCHRG MGMT >30: CPT | Performed by: FAMILY MEDICINE

## 2021-06-26 PROCEDURE — 80053 COMPREHEN METABOLIC PANEL: CPT | Performed by: FAMILY MEDICINE

## 2021-06-26 RX ORDER — VANCOMYCIN HYDROCHLORIDE 125 MG/1
CAPSULE ORAL
Qty: 105 CAPSULE | Refills: 0 | Status: SHIPPED | OUTPATIENT
Start: 2021-06-26 | End: 2021-09-26

## 2021-06-26 RX ADMIN — Medication 500 MG: at 00:08

## 2021-06-26 RX ADMIN — Medication 500 MG: at 05:59

## 2021-06-26 RX ADMIN — Medication 500 MG: at 12:49

## 2021-06-26 RX ADMIN — FAMOTIDINE 20 MG: 20 TABLET ORAL at 09:21

## 2021-06-26 RX ADMIN — METRONIDAZOLE 500 MG: 500 INJECTION, SOLUTION INTRAVENOUS at 02:28

## 2021-06-26 RX ADMIN — METRONIDAZOLE 500 MG: 500 INJECTION, SOLUTION INTRAVENOUS at 09:30

## 2021-06-26 RX ADMIN — GABAPENTIN 300 MG: 300 CAPSULE ORAL at 09:21

## 2021-06-26 RX ADMIN — HEPARIN SODIUM 5000 UNITS: 5000 INJECTION INTRAVENOUS; SUBCUTANEOUS at 05:59

## 2021-06-26 RX ADMIN — Medication 1000 UNITS: at 09:21

## 2021-06-26 RX ADMIN — CALCIUM CARBONATE (ANTACID) CHEW TAB 500 MG 500 MG: 500 CHEW TAB at 00:08

## 2021-06-26 NOTE — INCIDENTAL FINDINGS
The following findings require follow up:  Radiographic finding   Finding: CT abdomen pelvis wo contrast: The colon demonstrates mural thickening and loss of haustration throughout its length; there is dilatation of the cecum and proximal ascending colon  These findings are consistent with the history of C  difficile colitis  Findings in the cecum and , proximal ascending colon may represent early/developing toxic megacolon; maximum diameter measures 8 x 5 cm   , 6 x 4 mm nodule in the right middle lobe (2:3)  4 mm nodule right middle lobe (2:3)  Right lower lobe 6 x 7 mm nodule (2:2)  These are similar to the prior exam from 3/4/2021   Based on current Fleischner Society 2017 Guidelines on incidental pulmonary , nodule, followup non-contrast CT is recommended at 3-6 months from the initial examination and, if stable at that time, an additional followup is recommended for 18-24 months from the initial examination   Follow up required: yes   Follow up should be done within 1 week(s)    Please notify the following clinician to assist with the follow up:   Dr María Arauz DO  PCP - General Family Medicine [phone] [address] [address]     Next Steps: Schedule an appointment as soon as possible for a visit

## 2021-06-26 NOTE — DISCHARGE INSTRUCTIONS
Acute Kidney Injury   AMBULATORY CARE:   Acute kidney injury (KATELIN) is also called acute kidney failure, or acute renal failure  KATELIN happens when your kidneys suddenly stop working correctly  Normally, the kidneys remove fluid, chemicals, and waste from your blood  These wastes are turned into urine by your kidneys  KATELIN usually happens over hours or days  When you have KATELIN, your kidneys do not remove the waste, chemicals, or extra fluid from your body  A normal amount of urine is not produced  KATELIN is usually temporary, but it may become a chronic kidney condition  Causes of KATELIN:   · Decreased blood flow to the kidney, such as from hypercalcemia (high blood calcium level) or severe heart disease     · A disease or condition that affects the kidneys, such as hypertension (high blood pressure) or diabetes     · A blockage in the kidney or ureter, such as a kidney or bladder stone, enlarged prostate, or tumor    Common symptoms include the following: You may not have any symptoms with early or mild KATELIN  As KATELIN progresses, you may have any of the following:  · Decrease in the amount of urine or no urination    · Swelling in your arms, legs, or feet     · Weakness, drowsiness, or no appetite    · Nausea, flank pain, muscle twitching or muscle cramps    · Itchy skin, or your, breath or body smells like urine    · Behavior changes, confusion, disorientation, or seizures    Call 911 if:   · You have sudden chest pain or trouble breathing  Seek care immediately if:   · Your symptoms get worse  Contact your healthcare provider if:   · Your symptoms return  · Your blood sugar or blood pressure level is not within the range your healthcare provider recommends  · You have questions or concerns about your condition or care  Treatment for KATELIN  depends upon the cause of your acute kidney injury and how severe it is   Usually, KATELIN will be monitored in the hospital  If you have mild KATELIN, you may be able to go home to recover  Your healthcare providers will treat the cause of your KATELIN  You may need IV fluids if your KATELIN was caused by little or no fluid in your body  You may need dialysis to remove waste and extra fluid from your body  Nutrition:  Your healthcare provider may tell you to eat food low in sodium (salt), potassium, phosphorus, or protein  A dietitian can help you plan your meals  Drink liquids as directed: Your healthcare provider may recommend that you drink a certain amount of liquids  This will help your kidneys work better and decrease your risk for dehydration  Ask how much liquid to drink each day and which liquids are best for you  What you can do to manage and prevent KATELIN:   · Monitor and manage other health conditions  such as diabetes, high blood pressure, or heart disease  These conditions increase your risk for acute kidney injury  Take your medicines for these conditions as directed  Also, monitor your blood sugar and blood pressure levels as directed  Contact your healthcare provider if your levels are not in the range he or she says it should be  · Talk to your healthcare provider before you take over-the-counter-medicine  NSAIDs, stomach medicine, or laxatives may harm your kidneys and increase your risk for acute kidney injury  If it is okay to take the medicine, follow the directions on the package  Do not take more than directed  · Tell healthcare providers you have had acute kidney injury  before you get contrast liquid for an x-ray or CT scan  Your healthcare provider may give you medicine to prevent kidney problems caused by the liquid  Follow up with your healthcare provider as directed:  Write down your questions so you remember to ask them during your visits  © Copyright [address] Information is for End User's use only and may not be sold, redistributed or otherwise used for commercial purposes   All illustrations and images included in CareNotes® are the copyrighted property of A D [employer] M [employer] Inc  or [address]   The above information is an  only  It is not intended as medical advice for individual conditions or treatments  Talk to your doctor, nurse or pharmacist before following any medical regimen to see if it is safe and effective for you  C  Diff (Clostridioides Difficile) Infection   AMBULATORY CARE:   What you need to know about a C  diff infection (CDI):  Clostridioides difficile, Clostridium difficile, or C  diff, is a bacterium that causes diarrhea, irritation, and swelling of the colon  Antibiotic use is the most common cause of CDI  The bowel movement of a person with a CDI contains C  diff  Infected people who do not wash their hands properly after having a bowel movement can spread C  diff  The bacteria can live a long time on surfaces you touch, such as the tops of tables  Signs and symptoms of a CDI:   · Diarrhea several times each day    · Foul-smelling diarrhea    · Blood, mucus, or pus in your bowel movements    · Dehydration from diarrhea    · Nausea or vomiting    · Cramps in your abdomen    · A fever    Call your local emergency number (911 in the [address] [place] [address],[address]) if:   · You have a fever and stomach cramps that get worse, or do not go away  · Your abdomen is hard or feels swollen  · You have black or bright red bowel movements  · You vomit blood  · You are short of breath, or feel like you are going to faint  · You have any of the following signs of dehydration:    ? Dizziness or weakness, or extreme sleepiness    ? Dry mouth, cracked lips, or you feel very thirsty    ? Fast heartbeat or rapid breathing    ? Very little urine or no urine    ? Sunken eyes    Call your doctor if:   · You have a fever  · Your signs and symptoms get worse  · Your signs and symptoms do not go away, or they come back, even after treatment  · You cannot eat or drink      · You have questions or concerns about your condition or care     Treatment:  The goal of treatment is to restore the healthy balance of bacteria to your colon  This should help stop your diarrhea  You may need the following:  · Antibiotics  help treat or prevent an infection caused by bacteria  If antibiotics caused your CDI, you may need to stop taking them and switch to a different antibiotic  · Surgery  may be needed if your CDI is severe or damaged your colon  During surgery, part of your colon is removed  What you can do to manage or prevent a CDI:   · Wash your hands often  Wash your hands several times each day  Wash after you use the bathroom, change a child's diaper, and before you prepare or eat food  Use soap and water every time  Rub your soapy hands together, lacing your fingers  Wash the front and back of your hands, and in between your fingers  Use the fingers of one hand to scrub under the fingernails of the other hand  Wash for at least 20 seconds  Rinse with warm, running water for several seconds  Then dry your hands with a clean towel or paper towel  Use hand  that contains alcohol if soap and water are not available  Do not touch your eyes, nose, or mouth without washing your hands first          · Clean surfaces often  Clean doorknobs, countertops, cell phones, and other surfaces that are touched often  Use a disinfecting wipe, a single-use sponge, or a cloth you can wash and reuse  Use disinfecting  if you do not have wipes  You can create a disinfecting  by mixing 1 part bleach with 10 parts water  · Prevent the spread of C  diff  Do not share any items with other people  Use as many disposable items as you can, such as paper plates  Do this until your diarrhea has stopped  · Ask about probiotics  Probiotics are also called good bacteria  They can help protect you from harmful bacteria  If you develop more than one CDI, probiotics may help prevent more infections   Ask your healthcare provider if probiotics are right for you  You may be able to eat yogurt or other foods high in probiotics  Your provider may instead recommend a pill or liquid form  · Drink more liquids to prevent dehydration  You may also drink an oral rehydration solution (ORS)  An ORS has the right amounts of water, salts, and sugar needed to replace body fluids  Ask your healthcare provider where to buy ORS and how much to drink  What you need to know about correct antibiotic use:   · Take your antibiotic as directed  Do not skip a dose of your antibiotic  Do not stop taking your antibiotic, even if you feel better  Finish the entire dose of your antibiotic unless your healthcare provider tells you to stop  · Get rid of any antibiotics you did not use  Ask your healthcare provider or pharmacist how to get rid of antibiotics  Do not share your antibiotic with another person  Do not take an antibiotic from another illness without talking to your healthcare provider  · Prevent infections caused by bacteria  This will help prevent your need for an antibiotic  Ask about vaccines that you need  Wash your hands frequently to prevent the spread of infection  · Ask your healthcare provider how to manage your symptoms without antibiotics  Your healthcare provider can recommend other treatments based on your illness  An example includes over-the-counter medicines such as acetaminophen or NSAIDs  Follow up with your doctor in 1 to 2 days: You may need to have an antibiotic changed if it caused your CDI  Write down your questions so you remember to ask them during your visits  © Copyright [address] Information is for End User's use only and may not be sold, redistributed or otherwise used for commercial purposes  All illustrations and images included in CareNotes® are the copyrighted property of [employer]  or [facility] Mayra Marin   The above information is an  only   It is not intended as medical advice for individual conditions or treatments  Talk to your doctor, nurse or pharmacist before following any medical regimen to see if it is safe and effective for you

## 2021-06-26 NOTE — ASSESSMENT & PLAN NOTE
Secondary to recurrent C diff infection  Condition improved with treatment  Patient be discharged home with p o   Antibiotic

## 2021-06-26 NOTE — PLAN OF CARE
Problem: Potential for Falls  Goal: Patient will remain free of falls  Description: INTERVENTIONS:  - Educate patient/family on patient safety including physical limitations  - Instruct patient to call for assistance with activity   - Consult OT/PT to assist with strengthening/mobility   - Keep Call bell within reach  - Keep bed low and locked with side rails adjusted as appropriate  - Keep care items and personal belongings within reach  - Initiate and maintain comfort rounds  - Make Fall Risk Sign visible to staff  - Offer Toileting every 2 Hours, in advance of need  - Initiate/Maintain bed and chair alarm  - Apply yellow socks and bracelet for high fall risk patients  - Consider moving patient to room near nurses station  Outcome: Progressing     Problem: PAIN - ADULT  Goal: Verbalizes/displays adequate comfort level or baseline comfort level  Description: Interventions:  - Encourage patient to monitor pain and request assistance  - Assess pain using appropriate pain scale  - Administer analgesics based on type and severity of pain and evaluate response  - Implement non-pharmacological measures as appropriate and evaluate response  - Consider cultural and social influences on pain and pain management  - Notify physician/advanced practitioner if interventions unsuccessful or patient reports new pain  Outcome: Progressing     Problem: SAFETY ADULT  Goal: Patient will remain free of falls  Description: INTERVENTIONS:  - Educate patient/family on patient safety including physical limitations  - Instruct patient to call for assistance with activity   - Consult OT/PT to assist with strengthening/mobility   - Keep Call bell within reach  - Keep bed low and locked with side rails adjusted as appropriate  - Keep care items and personal belongings within reach  - Initiate and maintain comfort rounds  - Make Fall Risk Sign visible to staff  - Offer Toileting every 2 Hours, in advance of need  - Initiate/Maintain bed and chair alarm  - Apply yellow socks and bracelet for high fall risk patients  - Consider moving patient to room near nurses station  Outcome: Progressing  Goal: Maintain or return to baseline ADL function  Description: INTERVENTIONS:  - Educate patient/family on patient safety including physical limitations  - Instruct patient to call for assistance with activity   - Consult OT/PT to assist with strengthening/mobility   - Keep Call bell within reach  - Keep bed low and locked with side rails adjusted as appropriate  - Keep care items and personal belongings within reach  - Initiate and maintain comfort rounds  - Make Fall Risk Sign visible to staff  - Offer Toileting every 2 Hours, in advance of need  - Initiate/Maintain bed and chair alarm  - Apply yellow socks and bracelet for high fall risk patients  - Consider moving patient to room near nurses station  Outcome: Progressing  Goal: Maintains/Returns to pre admission functional level  Description: INTERVENTIONS:  - Perform BMAT or MOVE assessment daily    - Set and communicate daily mobility goal to care team and patient/family/caregiver     - Collaborate with rehabilitation services on mobility goals if consulted  Problem: DISCHARGE PLANNING  Goal: Discharge to home or other facility with appropriate resources  Description: INTERVENTIONS:  - Identify barriers to discharge w/patient and caregiver  - Arrange for needed discharge resources and transportation as appropriate  - Identify discharge learning needs (meds, wound care, etc )  - Arrange for interpretive services to assist at discharge as needed  - Refer to Case Management Department for coordinating discharge planning if the patient needs post-hospital services based on physician/advanced practitioner order or complex needs related to functional status, cognitive ability, or social support system  Outcome: Progressing     Problem: INFECTION - ADULT  Goal: Absence or prevention of progression during hospitalization  Description: INTERVENTIONS:  - Assess and monitor for signs and symptoms of infection  - Monitor lab/diagnostic results  - Monitor all insertion sites, i e  indwelling lines, tubes, and drains  - Monitor endotracheal if appropriate and nasal secretions for changes in amount and color  - [place] appropriate cooling/warming therapies per order  - Administer medications as ordered  - Instruct and encourage patient and family to use good hand hygiene technique  - Identify and instruct in appropriate isolation precautions for identified infection/condition  Outcome: Progressing  Goal: Absence of fever/infection during neutropenic period  Description: INTERVENTIONS:  - Monitor WBC    Outcome: Progressing     Problem: Nutrition/Hydration-ADULT  Goal: Nutrient/Hydration intake appropriate for improving, restoring or maintaining nutritional needs  Description: Monitor and assess patient's nutrition/hydration status for malnutrition  Collaborate with interdisciplinary team and initiate plan and interventions as ordered  Monitor patient's weight and dietary intake as ordered or per policy  Utilize nutrition screening tool and intervene as necessary  Determine patient's food preferences and provide high-protein, high-caloric foods as appropriate       INTERVENTIONS:  - Monitor oral intake, urinary output, labs, and treatment plans  - Assess nutrition and hydration status and recommend course of action  - Evaluate amount of meals eaten  - Assist patient with eating if necessary   - Allow adequate time for meals  - Recommend/ encourage appropriate diets, oral nutritional supplements, and vitamin/mineral supplements  - Order, calculate, and assess calorie counts as needed  - Recommend, monitor, and adjust tube feedings and TPN/PPN based on assessed needs  - Assess need for intravenous fluids  - Provide specific nutrition/hydration education as appropriate  - Include patient/family/caregiver in decisions related to nutrition  Outcome: Progressing     - Out of bed for toileting  - Record patient progress and toleration of activity level   Outcome: Progressing

## 2021-06-26 NOTE — CASE MANAGEMENT
Information for [place] OP PT location provided on patient's AVS  CM s/w patient's daughter regarding OP PT services, daughter stating unsure at this time but will follow-up at [facility] if decide to go  No additional questions at this time  IMM reviewed with daughter by phone, no questions or concerns at this time

## 2021-06-26 NOTE — ASSESSMENT & PLAN NOTE
Recurrent in nature, this is the 2nd episode  Id consult appreciated  Patient received treatment with p o  Vancomycin as well as IV Flagyl with the treatment, patient condition improved, diarrhea frequency improved, patient is having formed stool  Patient be discharged with p o   Vancomycin  As per ID recommendation, try to avoid any kind of antibiotic unless it is really necessary also patient will need C diff prophylactic in future with any kind of antibiotic treatment  Patient also will need fall to follow-up with GI, referral provided

## 2021-06-26 NOTE — DISCHARGE SUMMARY
[address]  Discharge- Jaquelin Gary 1940, 78 y o  male MRN: [id number]  Unit/Bed#: -01 Encounter: [id number]  Primary Care Provider: Candelaria Knowles DO   Date and time admitted to hospital: 6/23/2021  5:30 PM    C  difficile colitis  Assessment & Plan  Recurrent in nature, this is the 2nd episode  Id consult appreciated  Patient received treatment with p o  Vancomycin as well as IV Flagyl with the treatment, patient condition improved, diarrhea frequency improved, patient is having formed stool  Patient be discharged with p o  Vancomycin  As per ID recommendation, try to avoid any kind of antibiotic unless it is really necessary also patient will need C diff prophylactic in future with any kind of antibiotic treatment  Patient also will need fall to follow-up with GI, referral provided    * Sepsis with acute organ dysfunction [facility])  Assessment & Plan  Sepsis with KATELIN secondary to C diff colitis  Blood culture shows no growth  Enteric panel negative, C diff PCR positive, Nicole negative  Received p o  Vancomycin as well as IV Flagyl, with the treatment, patient's condition improved patient be discharged with p o  Vancomycin as per ID recommendation  As per ID:  Try to strictly avoid any kind of antibiotic unless really necessary, with C diff prophylaxix    Abnormal CT of the abdomen  Assessment & Plan  The colon demonstrates mural thickening and loss of haustration throughout its length; there is dilatation of the cecum and proximal ascending colon  These findings are consistent with the history of C  difficile colitis  Findings in the cecum and   proximal ascending colon may represent early/developing toxic megacolon; maximum diameter measures 8 x 5 cm  [facility] general surgery consulted, recommends conservative management    Clinically patient does not look toxic-patient tolerating regular diet without any signs or symptoms of acute abdomen  Patient be discharged home    Hyponatremia  Assessment & Plan  Most likely hypovolemic hyponatremia secondary to diarrhea  Result    KATELIN (acute kidney injury) ([facility] [address] )  Assessment & Plan  Most likely secondary to dehydration and  C diff colitis  Baseline creatinine is 1 58  Resolved    Diarrhea of infectious origin  Assessment & Plan  Secondary to recurrent C diff infection  Condition improved with treatment  Patient be discharged home with p o  Antibiotic    Pulmonary nodule  Assessment & Plan  6 x 4 mm nodule in the right middle lobe (2:3)  4 mm nodule right middle lobe (2:3)  Right lower lobe 6 x 7 mm nodule (2:2)  These are similar to the prior exam from 3/4/2021     patient will need outpatient follow-up    Chronic back pain  Assessment & Plan  Patient will benefited from outpatient PT/OT, referral provided    CLL (chronic lymphocytic leukemia) [facility])  Assessment & Plan  Patient follows with oncologist    Essential hypertension  Assessment & Plan  Patient was on lisinopril  KATELIN resolved, patient can resume home medication    Thrombocytopenia (HCC)  Assessment & Plan  Platelet count dropped to 95 K, no active bleeding noted  Today platelet is 285>203    Discharging Physician / Practitioner: Marlon Hannah MD  PCP: Misael Ortiz, DO  Admission Date:   Admission Orders (From admission, onward)     Ordered        06/23/21 1831  Inpatient Admission  Once                   Discharge Date: 06/26/21    Medical Problems     Resolved Problems  Date Reviewed: 3/4/2021    None                Consultations During Hospital Stay:  · General surgery  · Infectious disease    Procedures Performed:   CT abdomen pelvis wo contrast   Final Result by Chnio Carrillo MD (06/23 2058)      The colon demonstrates mural thickening and loss of haustration throughout its length; there is dilatation of the cecum and proximal ascending colon  These findings are consistent with the history of C  difficile colitis   Findings in the cecum and    proximal ascending colon may represent early/developing toxic megacolon; maximum diameter measures 8 x 5 cm            6 x 4 mm nodule in the right middle lobe (2:3)  4 mm nodule right middle lobe (2:3)  Right lower lobe 6 x 7 mm nodule (2:2)  These are similar to the prior exam from 3/4/2021  Based on current Fleischner Society 2017 Guidelines on incidental pulmonary    nodule, followup non-contrast CT is recommended at 3-6 months from the initial examination and, if stable at that time, an additional followup is recommended for 18-24 months from the initial examination  The study was marked in [facility] for immediate notification              Workstation performed: [id number]           ·     Significant Findings / Test Results:   Lab Results   Component Value Date    WBC 3 96 (L) 06/26/2021    HGB 10 5 (L) 06/26/2021    HCT 31 4 (L) 06/26/2021    MCV 95 06/26/2021     (L) 06/26/2021     Lab Results   Component Value Date    SODIUM 136 06/26/2021    K 4 2 06/26/2021     06/26/2021    CO2 24 06/26/2021    AGAP 6 06/26/2021    BUN 13 06/26/2021    CREATININE 1 27 06/26/2021    GLUC 98 06/26/2021    CALCIUM 7 3 (L) 06/26/2021    AST 17 06/26/2021    ALT 20 06/26/2021    ALKPHOS 41 (L) 06/26/2021    TP 5 0 (L) 06/26/2021    TBILI 0 29 06/26/2021    EGFR 52 06/26/2021     Collected Updated Procedure Result Status Patient Facility Result Comment    06/23/2021 2309 06/24/2021 1510 Stool Enteric Bacterial Panel by PCR [[id number]]   Stool from Per Rectum    Final result [address]  Component Value   Salmonella sp PCR None Detected   Shigella sp/Enteroinvasive E  coli (EIEC) PCR None Detected   Campylobacter sp (jejuni and coli) PCR None Detected   Shiga toxin 1/Shiga toxin 2 genes PCR None Detected              06/23/2021 2041 06/24/2021 1544 Clostridium difficile toxin by PCR with EIA [[id number]]    (Abnormal)   Stool    Final result [address]   Component Value    C difficile toxin by PCR  PositiveAbnormal         C difficile Toxins A+B, EIA Negative    Probable C  difficile colonization without active infection  Treatment recommended if severe diarrhea without alternate etiology  Maintain strict contact and hand hygiene precautions  06/23/2021 2018 06/26/2021 1001 Blood culture [[id number]]   Blood from Arm, Left    Preliminary result [address]  Component Value   Blood Culture No Growth at 48 hrs  P              06/23/2021 2018 06/26/2021 1001 Blood culture [[id number]]   Blood from Arm, Right    Preliminary result [address]  Component Value   Blood Culture No Growth at 48 hrs  P                ·     Incidental Findings:   · Abnormal CT scan of abdomen     Test Results Pending at Discharge (will require follow up): · None     Outpatient Tests Requested:  · None    Complications: KATELIN    Reason for Admission:  Diarrhea    Hospital Course:     Mack Rojas is a 78 y o  male patient who originally presented to the hospital on 6/23/2021 due to diarrhea and weakness  Patient with history of recent C diff infection, start having diarrhea after using antibiotic secondary to his cellulitis  Patient also found KATELIN most likely secondary to diarrhea  Patient admitted under the diagnosis of sepsis with acute organ dysfunction with KATELIN  Most likely secondary to C diff colitis  Patient started on p o  Vancomycin, and IV hydration  Infectious disease consulted, recommends to continue high dose of p o  Vancomycin as well as IV Flagyl  C diff PCR came back positive, EIA negative, enteric panel negative, blood culture negative  Patient remained afebrile for 36 hours  With the treatment, patient's condition improved, patient is having less frequent bowel movement with formed stool  Denies any signs or symptoms of acute abdomen  As per ID recommendation, patient will be discharge with p o   Vancomycin tapering dose as below: PO Vancomycin 125 qid x 14 days             PO Vancomycin 125 bid x 7 days             PO Vancomycin 125 od x 7 days             PO Vancomycin 125 every other day x 6 weeks  Patient will need to be followed up with gastroenterologist for possible colonoscopy at least 8 to 12 weeks later  Referral provided  In future, recommends to try to avoid any kind of antibiotic unless really necessary  If patient remain on antibiotic, patient will need prophylactic treatment for C diff with vancomycin  With the treatment, patient's KATELIN resolved  CT scan of abdomen was concerning regarding toxic megacolon-general surgery consulted, clinically patient does not present as toxic megacolon  Patient was tolerating clear liquid diet in she initially, later on tolerating regular diet  With formed stool  On physical exam no signs or symptoms of acute abdomen  Patient remained afebrile  Surgery signed off  Patient also found pulmonary nodule, for which patient will need surveillance imaging with PCP  Patient advised to follow-up with PCP, oncologist as scheduled  Gastroenterology referral provided  Also PT/OT head referral placed  Please see above list of diagnoses and related plan for additional information  Condition at Discharge: stable     Discharge Day Visit / Exam:     Subjective:  Seen and evaluated during the round  Patient reports his not having any abdominal pain, distention, nausea, vomiting  Having bowel movement with formed stool  Vitals: Blood Pressure: 131/84 (06/26/21 0711)  Pulse: 87 (06/26/21 0711)  Temperature: 97 8 °F (36 6 °C) (06/26/21 0711)  Temp Source: Axillary (06/24/21 0950)  Respirations: 20 (06/26/21 0711)  Height: 5' 8" (172 7 cm) (06/23/21 1731)  Weight - Scale: 83 6 kg (184 lb 6 4 oz) (06/26/21 0600)  SpO2: 96 % (06/26/21 0711)     Exam:   Physical Exam  Vitals and nursing note reviewed  Exam conducted with a chaperone present     Constitutional:       Appearance: He is not diaphoretic  HENT:      Head: Normocephalic  Nose: Nose normal  No congestion  Mouth/Throat:      Mouth: Mucous membranes are moist       Pharynx: No oropharyngeal exudate  Eyes:      General: No scleral icterus  Conjunctiva/sclera: Conjunctivae normal       Pupils: Pupils are equal, round, and reactive to light  Cardiovascular:      Rate and Rhythm: Normal rate  Pulses: Normal pulses  Heart sounds: No friction rub  No gallop  Pulmonary:      Effort: Pulmonary effort is normal  No respiratory distress  Breath sounds: No stridor  No wheezing or rhonchi  Abdominal:      General: Abdomen is flat  Bowel sounds are normal  There is no distension  Palpations: There is no mass  Tenderness: There is no abdominal tenderness  There is no guarding or rebound  Hernia: No hernia is present  Genitourinary:     Penis: Normal     Musculoskeletal:         General: Normal range of motion  Cervical back: Normal range of motion  Right lower leg: No edema  Lymphadenopathy:      Cervical: No cervical adenopathy  Skin:     General: Skin is warm  Capillary Refill: Capillary refill takes less than 2 seconds  Coloration: Skin is not jaundiced or pale  Findings: No bruising  Neurological:      General: No focal deficit present  Mental Status: He is alert and oriented to person, place, and time  Cranial Nerves: No cranial nerve deficit  Sensory: No sensory deficit  Motor: No weakness  Coordination: Coordination normal    Psychiatric:         Mood and Affect: Mood normal        Discussion with Family: yes    Discharge instructions/Information to patient and family:   See after visit summary for information provided to patient and family  Provisions for Follow-Up Care:  See after visit summary for information related to follow-up care and any pertinent home health orders        Disposition:     Home    For Discharges to [facility]:   · Not Applicable to this Patient - Not Applicable to this Patient    Planned Readmission:  If condition get worse     Discharge Statement:  I spent >35 minutes discharging the patient  This time was spent on the day of discharge  I had direct contact with the patient on the day of discharge  Greater than 50% of the total time was spent examining patient, answering all patient questions, arranging and discussing plan of care with patient as well as directly providing post-discharge instructions  Additional time then spent on discharge activities  Discharge Medications:  See after visit summary for reconciled discharge medications provided to patient and family        ** Please Note: This note has been constructed using a voice recognition system **

## 2021-06-26 NOTE — NURSING NOTE
AVS reviewed with patient and copy provided, offered to walk to car to review with daughter, pt declines, leaving with PCA  Discharged home in stable condition

## 2021-06-26 NOTE — ASSESSMENT & PLAN NOTE
The colon demonstrates mural thickening and loss of haustration throughout its length; there is dilatation of the cecum and proximal ascending colon  These findings are consistent with the history of C  difficile colitis  Findings in the cecum and   proximal ascending colon may represent early/developing toxic megacolon; maximum diameter measures 8 x 5 cm  [facility] general surgery consulted, recommends conservative management    Clinically patient does not look toxic-patient tolerating regular diet without any signs or symptoms of acute abdomen  Patient be discharged home

## 2021-06-26 NOTE — ASSESSMENT & PLAN NOTE
Sepsis with KATELIN secondary to C diff colitis  Blood culture shows no growth  Enteric panel negative, C diff PCR positive, Nicole negative  Received p o  Vancomycin as well as IV Flagyl, with the treatment, patient's condition improved patient be discharged with p o   Vancomycin as per ID recommendation  As per ID:  Try to strictly avoid any kind of antibiotic unless really necessary, with C diff prophylaxix

## 2021-06-28 LAB
ATRIAL RATE: 88 BPM
P AXIS: -7 DEGREES
PR INTERVAL: 168 MS
QRS AXIS: -48 DEGREES
QRSD INTERVAL: 106 MS
QT INTERVAL: 354 MS
QTC INTERVAL: 428 MS
T WAVE AXIS: 46 DEGREES
VENTRICULAR RATE: 88 BPM

## 2021-06-28 PROCEDURE — 93010 ELECTROCARDIOGRAM REPORT: CPT | Performed by: INTERNAL MEDICINE

## 2021-06-28 NOTE — UTILIZATION REVIEW
Notification of Discharge   This is a Notification of Discharge from our facility [address]  Please be advised that this patient has been discharge from our facility  Below you will find the admission and discharge date and time including the patients disposition  UTILIZATION REVIEW CONTACT:  Jona Witt  Utilization   Network Utilization Review Department  Phone: [phone] x carefully listen to the prompts  All voicemails are confidential   Email: [email or web address]     PHYSICIAN ADVISORY SERVICES:  FOR [id number] REVIEW - MEDICAL NECESSITY DENIAL  Phone: [phone]  Fax: [phone]  Email: [email or web address]     PRESENTATION DATE: 6/23/2021  5:30 PM  OBERVATION ADMISSION DATE:   INPATIENT ADMISSION DATE: 6/23/21  6:31 PM   DISCHARGE DATE: 6/26/2021  1:59 PM  DISPOSITION: Home/Self Care Home/Self Care      IMPORTANT INFORMATION:  Send all requests for admission clinical reviews, approved or denied determinations and any other requests to dedicated fax number below belonging to the campus where the patient is receiving treatment   List of dedicated fax numbers:  [address] DENIALS (Administrative/Medical Necessity) [phone]   [address] [place] [address] (Maternity/NICU/Pediatrics) [phone]   Dre Tucker [phone]   Yesi Stearns [phone]   Srinivasa Granados [phone]   Rachel Tidwell [address] [phone]   [facility]  [phone] [address] [facility]  [address] [facility] [address] [facility] [phone]

## 2021-06-29 LAB
BACTERIA BLD CULT: NORMAL
BACTERIA BLD CULT: NORMAL

## 2021-11-18 ENCOUNTER — HOSPITAL ENCOUNTER (EMERGENCY)
Facility: HOSPITAL | Age: 81
Discharge: HOME | End: 2021-11-18
Attending: EMERGENCY MEDICINE | Admitting: EMERGENCY MEDICINE
Payer: COMMERCIAL

## 2021-11-18 VITALS
DIASTOLIC BLOOD PRESSURE: 64 MMHG | SYSTOLIC BLOOD PRESSURE: 130 MMHG | RESPIRATION RATE: 16 BRPM | OXYGEN SATURATION: 97 % | WEIGHT: 179.45 LBS | TEMPERATURE: 98.8 F | HEART RATE: 87 BPM | BODY MASS INDEX: 27.29 KG/M2

## 2021-11-18 DIAGNOSIS — R19.7 DIARRHEA, UNSPECIFIED TYPE: Primary | ICD-10-CM

## 2021-11-18 LAB
ALBUMIN SERPL BCP-MCNC: 3.4 G/DL (ref 3.5–5)
ALP SERPL-CCNC: 64 U/L (ref 46–116)
ALT SERPL W P-5'-P-CCNC: 23 U/L (ref 12–78)
ANION GAP SERPL CALCULATED.3IONS-SCNC: 7 MMOL/L (ref 4–13)
AST SERPL W P-5'-P-CCNC: 13 U/L (ref 5–45)
BASOPHILS # BLD AUTO: 0.01 THOUSANDS/ΜL (ref 0–0.1)
BASOPHILS NFR BLD AUTO: 0 % (ref 0–1)
BILIRUB SERPL-MCNC: 0.46 MG/DL (ref 0.2–1)
BUN SERPL-MCNC: 24 MG/DL (ref 5–25)
CALCIUM ALBUM COR SERPL-MCNC: 9.4 MG/DL (ref 8.3–10.1)
CALCIUM SERPL-MCNC: 8.9 MG/DL (ref 8.3–10.1)
CHLORIDE SERPL-SCNC: 99 MMOL/L (ref 100–108)
CO2 SERPL-SCNC: 26 MMOL/L (ref 21–32)
CREAT SERPL-MCNC: 1.9 MG/DL (ref 0.6–1.3)
EOSINOPHIL # BLD AUTO: 0.01 THOUSAND/ΜL (ref 0–0.61)
EOSINOPHIL NFR BLD AUTO: 0 % (ref 0–6)
ERYTHROCYTE [DISTWIDTH] IN BLOOD BY AUTOMATED COUNT: 14.1 % (ref 11.6–15.1)
GFR SERPL CREATININE-BSD FRML MDRD: 32 ML/MIN/1.73SQ M
GLUCOSE SERPL-MCNC: 124 MG/DL (ref 65–140)
HCT VFR BLD AUTO: 38.5 % (ref 36.5–49.3)
HGB BLD-MCNC: 12.8 G/DL (ref 12–17)
IMM GRANULOCYTES # BLD AUTO: 0.04 THOUSAND/UL (ref 0–0.2)
IMM GRANULOCYTES NFR BLD AUTO: 1 % (ref 0–2)
LACTATE SERPL-SCNC: 1.3 MMOL/L (ref 0.5–2)
LYMPHOCYTES # BLD AUTO: 2 THOUSANDS/ΜL (ref 0.6–4.47)
LYMPHOCYTES NFR BLD AUTO: 25 % (ref 14–44)
MCH RBC QN AUTO: 31.8 PG (ref 26.8–34.3)
MCHC RBC AUTO-ENTMCNC: 33.2 G/DL (ref 31.4–37.4)
MCV RBC AUTO: 96 FL (ref 82–98)
MONOCYTES # BLD AUTO: 1.96 THOUSAND/ΜL (ref 0.17–1.22)
MONOCYTES NFR BLD AUTO: 25 % (ref 4–12)
NEUTROPHILS # BLD AUTO: 3.86 THOUSANDS/ΜL (ref 1.85–7.62)
NEUTS SEG NFR BLD AUTO: 49 % (ref 43–75)
NRBC BLD AUTO-RTO: 0 /100 WBCS
PLATELET # BLD AUTO: 56 THOUSANDS/UL (ref 149–390)
PMV BLD AUTO: 12 FL (ref 8.9–12.7)
POTASSIUM SERPL-SCNC: 4.2 MMOL/L (ref 3.5–5.3)
PROT SERPL-MCNC: 7.3 G/DL (ref 6.4–8.2)
RBC # BLD AUTO: 4.02 MILLION/UL (ref 3.88–5.62)
SODIUM SERPL-SCNC: 132 MMOL/L (ref 136–145)
WBC # BLD AUTO: 7.88 THOUSAND/UL (ref 4.31–10.16)

## 2021-11-18 PROCEDURE — 87493 C DIFF AMPLIFIED PROBE: CPT | Performed by: EMERGENCY MEDICINE

## 2021-11-18 PROCEDURE — 85025 COMPLETE CBC W/AUTO DIFF WBC: CPT | Performed by: EMERGENCY MEDICINE

## 2021-11-18 PROCEDURE — 83605 ASSAY OF LACTIC ACID: CPT | Performed by: EMERGENCY MEDICINE

## 2021-11-18 PROCEDURE — 80053 COMPREHEN METABOLIC PANEL: CPT | Performed by: EMERGENCY MEDICINE

## 2021-11-18 PROCEDURE — 99284 EMERGENCY DEPT VISIT MOD MDM: CPT | Performed by: EMERGENCY MEDICINE

## 2021-11-18 PROCEDURE — 99284 EMERGENCY DEPT VISIT MOD MDM: CPT

## 2021-11-18 PROCEDURE — 96360 HYDRATION IV INFUSION INIT: CPT

## 2021-11-18 RX ORDER — VANCOMYCIN HYDROCHLORIDE 125 MG/1
125 CAPSULE ORAL 4 TIMES DAILY
Qty: 40 CAPSULE | Refills: 0 | Status: SHIPPED | OUTPATIENT
Start: 2021-11-18 | End: 2021-11-28

## 2021-11-18 RX ADMIN — SODIUM CHLORIDE 1000 ML: 0.9 INJECTION, SOLUTION INTRAVENOUS at 09:41

## 2021-11-19 LAB
C DIFF TOX A+B STL QL IA: POSITIVE
C DIFF TOX GENS STL QL NAA+PROBE: POSITIVE

## 2022-01-26 NOTE — ASSESSMENT & PLAN NOTE
· Osseous lesion in the right femur, imaging characteristics suggest a low-grade chondroid lesion, however metastasis is also differential consideration  · If prior  CT or MRI becomes available, these can be uploaded, and an addendum this report can be issued upon request   Unable to find these imaging studies in available records  · Follow CT scan of right femur Additional Progress Note...

## 2022-10-12 PROBLEM — A41.9 SEPSIS WITH ACUTE ORGAN DYSFUNCTION (HCC): Status: RESOLVED | Noted: 2021-03-04 | Resolved: 2022-10-12

## 2022-10-12 PROBLEM — R65.20 SEPSIS WITH ACUTE ORGAN DYSFUNCTION (HCC): Status: RESOLVED | Noted: 2021-03-04 | Resolved: 2022-10-12

## 2022-10-12 PROBLEM — A09 DIARRHEA OF INFECTIOUS ORIGIN: Status: RESOLVED | Noted: 2021-06-23 | Resolved: 2022-10-12

## 2023-08-29 ENCOUNTER — EVALUATION (OUTPATIENT)
Dept: PHYSICAL THERAPY | Facility: CLINIC | Age: 83
End: 2023-08-29
Payer: COMMERCIAL

## 2023-08-29 DIAGNOSIS — M54.42 CHRONIC BILATERAL LOW BACK PAIN WITH BILATERAL SCIATICA: Primary | ICD-10-CM

## 2023-08-29 DIAGNOSIS — M54.41 CHRONIC BILATERAL LOW BACK PAIN WITH BILATERAL SCIATICA: Primary | ICD-10-CM

## 2023-08-29 DIAGNOSIS — G89.29 CHRONIC BILATERAL LOW BACK PAIN WITH BILATERAL SCIATICA: Primary | ICD-10-CM

## 2023-08-29 PROCEDURE — 97161 PT EVAL LOW COMPLEX 20 MIN: CPT | Performed by: PHYSICAL THERAPIST

## 2023-08-29 PROCEDURE — 97110 THERAPEUTIC EXERCISES: CPT | Performed by: PHYSICAL THERAPIST

## 2023-08-29 PROCEDURE — 97112 NEUROMUSCULAR REEDUCATION: CPT | Performed by: PHYSICAL THERAPIST

## 2023-08-29 NOTE — PROGRESS NOTES
PT Evaluation     Today's date: 2023  Patient name: Giancarlo Guerrero  : 1940  MRN: [id number]  Referring provider: Ilda Leyva DO  Dx:   Encounter Diagnosis     ICD-10-CM    1. Chronic bilateral low back pain with bilateral sciatica  M54.42     M54.41     G89.29                      Assessment  Assessment details: Pt presents to PT with chronic low back pain that radiates into LE (post thigh b/l) with standing > 5 minutes and requires to sit. Signs and symptoms consistent with stenotic presentation with b/l sciatic pain. Wearing brace helps for short term. Will benefit from 3-4 weeks of PT to teach and progress home program and towards outlined goals.      Symptom irritability: moderate  Goals  ST-3 weeks  Independent with phase I lumbar stability program  Independent with hip strengthening program for HEP  Improving standing tolerance to 10 minutes prior to needing to sit      LT-6 weeks   independent with trunk strength HEP  Review proper lifting and lumbar mechanics for painfree lifting with ADLs and household activity  Improving standing tolerance to 15 minutes prior to needing to sit to allow improved community activity such as grocery shopping  Complete SLS for 10 sec b/l with proper hip and trunk control      Plan  Plan details: TE, NMR, TA, MT, self education, and modalities as needed in order to progress through skilled PT focused on  ROM, strength, balance, coordination   Patient would benefit from: skilled physical therapy  Planned modality interventions: cryotherapy and thermotherapy: hydrocollator packs  Planned therapy interventions: manual therapy, neuromuscular re-education, self care, therapeutic activities, therapeutic exercise and home exercise program  Frequency: 2x week  Duration in weeks: 4  Plan of Care beginning date: 2023  Plan of Care expiration date: 2023  Treatment plan discussed with: patient        Subjective Evaluation    History of Present Illness  Mechanism of injury: 78year old male presenting to PT with chronic low back pain of multiple years. Has done injections, chiropractor, PT, and hx of back surgery approx 8 years ago (described as laminectomy - denies any metal in back). He did not have much success with the back surgery and then was put on gabapentin which has helped. He continues to take this. Current complaints are low back pain when standing. After a few minutes it starts going down the back of his legs and requires him to sit. When he sits the pain subsides immediately. Wears a lumbar corset if he has to do work at home so he doesn't bend over. Denies bowel/bladder issues, saddle anesthesia. No recent falls.    Patient Goals  Patient goals for therapy: decreased pain, improved balance, increased motion, increased strength and independence with ADLs/IADLs    Pain  At best pain ratin  At worst pain ratin          Objective   Observation/Gait  Slightly forward trunk position      Multi-seg movement patterns  Flex: to toes  Ext: not quite past neutral  SB: mid thigh b/l      Lumbar  Hypomobile pa    Tender to PSIS      Hip screen  Limited IR and ER b/l but symmetrical  Flex: WFL      Strength/myotome  Hip flex: 4+/5 (B)  Hip abd: 4/5 (B)  Knee ext/flex: 5/5 (B)  Ankle EV: 4/5, all others 5/5    Transfers: completes independently      Bridge: difficulty and pain         Precautions: hx of low back pain, kidney disease    Daily Treatment Diary:      Initial Evaluation Date: 23  POC Expiration: 23  Compliance 23                     Visit Number 1                    Re-Eval  IE                 MC   Foto Captured Y                      Manuals        Manual traction        Joint work        flexibility        ST        Neuro Re-Ed        PPT x15 with education       PPT+march x10       birddog        bridge x8 (but d/c)                               Ther Ex        NuStep/TM        clamshells Supine gtb x40       Hip add 20x5" ball squeeze supine       Hip ext nv       Ham curls        Knee ext        Ankle strength - band        Standing HR        Lumbar ext        Lumbar flex        Supine hand to knee x12 3" ea       SKTC 6x10"       LTR                education Provided and reviewed HEP       Ther Activity                        Gait Training                        Modalities                          Access Code: [email or web address]  URL: [email or web address]  Date: 08/29/2023  Prepared by: Marquis Almonte    Exercises  - Supine Alternating Knee Taps with Hands  - 1 x daily - 7 x weekly - 3 sets - 10 reps  - Supine Hip Adduction Isometric with Ball  - 1 x daily - 7 x weekly - 3 sets - 10 reps  - Hooklying Clamshell with Resistance  - 1 x daily - 7 x weekly - 3 sets - 10 reps  - Supine Posterior Pelvic Tilt  - 1 x daily - 7 x weekly - 3 sets - 10 reps

## 2023-08-31 ENCOUNTER — OFFICE VISIT (OUTPATIENT)
Dept: PHYSICAL THERAPY | Facility: CLINIC | Age: 83
End: 2023-08-31
Payer: COMMERCIAL

## 2023-08-31 DIAGNOSIS — M54.41 CHRONIC BILATERAL LOW BACK PAIN WITH BILATERAL SCIATICA: Primary | ICD-10-CM

## 2023-08-31 DIAGNOSIS — G89.29 CHRONIC BILATERAL LOW BACK PAIN WITH BILATERAL SCIATICA: Primary | ICD-10-CM

## 2023-08-31 DIAGNOSIS — M54.42 CHRONIC BILATERAL LOW BACK PAIN WITH BILATERAL SCIATICA: Primary | ICD-10-CM

## 2023-08-31 PROCEDURE — 97110 THERAPEUTIC EXERCISES: CPT

## 2023-08-31 PROCEDURE — 97112 NEUROMUSCULAR REEDUCATION: CPT

## 2023-08-31 NOTE — PROGRESS NOTES
Daily Note     Today's date: 2023  Patient name: Janae Bustamante  : 1940  MRN: [id number]  Referring provider: Yolanda Cheung DO  Dx:   Encounter Diagnosis     ICD-10-CM    1. Chronic bilateral low back pain with bilateral sciatica  M54.42     M54.41     G89.29                     Subjective: Patient reports had no pain after IE. Reports last night he had a great amount of discomfort but had ibuprofen and pain resolved. Objective: See treatment diary below      Assessment: Janae Bustamante tolerated treatment well. Tiered approach indicated secondary to chronicity and deconditioning. Continued treatment indicated. Plan: Continue per plan of care. Precautions: hx of low back pain, kidney disease    Daily Treatment Diary:      Initial Evaluation Date: 23  POC Expiration: 23  Compliance 23                   Visit Number 1 2                   Re-Eval  IE                 MC   Foto Captured Y                      Manuals       Manual traction        Joint work        flexibility        ST        Neuro Re-Ed        PPT x15 with education 5"x20      PPT+march x10 x20      birddog        bridge x8 (but d/c)                               Ther Ex        NuStep/TM        clamshells Supine gtb x40 Supine btb x30      Hip add 20x5" ball squeeze supine 20x5" ball squeeze supine      Hip ext nv Bent over 2x10      Ham curls        Knee ext        Ankle strength - band        Standing HR  x30      Lumbar ext        Lumbar flex        Supine hand to knee x12 3" ea x12 3" ea      SKTC 6x10" 10x10"      LTR                education Provided and reviewed HEP       Ther Activity                        Gait Training                        Modalities                          Access Code: [email or web address]  URL: [email or web address]  Date: 2023  Prepared by: Katharina Carver    Exercises  - Supine Alternating Knee Taps with Hands  - 1 x daily - 7 x weekly - 3 sets - 10 reps  - Supine Hip Adduction Isometric with Ball  - 1 x daily - 7 x weekly - 3 sets - 10 reps  - Hooklying Clamshell with Resistance  - 1 x daily - 7 x weekly - 3 sets - 10 reps  - Supine Posterior Pelvic Tilt  - 1 x daily - 7 x weekly - 3 sets - 10 reps

## 2023-09-05 ENCOUNTER — OFFICE VISIT (OUTPATIENT)
Dept: PHYSICAL THERAPY | Facility: CLINIC | Age: 83
End: 2023-09-05
Payer: COMMERCIAL

## 2023-09-05 DIAGNOSIS — M54.41 CHRONIC BILATERAL LOW BACK PAIN WITH BILATERAL SCIATICA: Primary | ICD-10-CM

## 2023-09-05 DIAGNOSIS — G89.29 CHRONIC BILATERAL LOW BACK PAIN WITH BILATERAL SCIATICA: Primary | ICD-10-CM

## 2023-09-05 DIAGNOSIS — M54.42 CHRONIC BILATERAL LOW BACK PAIN WITH BILATERAL SCIATICA: Primary | ICD-10-CM

## 2023-09-05 PROCEDURE — 97112 NEUROMUSCULAR REEDUCATION: CPT

## 2023-09-05 PROCEDURE — 97110 THERAPEUTIC EXERCISES: CPT

## 2023-09-05 NOTE — PROGRESS NOTES
Daily Note     Today's date: 2023  Patient name: Jacqui Rivera  : 1940  MRN: [id number]  Referring provider: Walter Quiroz DO  Dx:   Encounter Diagnosis     ICD-10-CM    1. Chronic bilateral low back pain with bilateral sciatica  M54.42     M54.41     G89.29                      Subjective: Patient reports back pain not better but not worse. Objective: See treatment diary below      Assessment: Jacqui Rivera appears to have improved quality of motion with improved mobility. Discomfort should decrease as motion and strength improve. Continued treatment should benefit. Plan: Continue per plan of care. Precautions: hx of low back pain, kidney disease    Daily Treatment Diary:      Initial Evaluation Date: 23  POC Expiration: 23  Compliance 23                 Visit Number 1 2  3                 Re-Eval  IE                 MC   Foto Captured Y                      Manuals      Manual traction        Joint work        flexibility        ST        Neuro Re-Ed        PPT x15 with education 5"x20 5"x20     PPT+march x10 x20 x20     birddog        bridge x8 (but d/c)                               Ther Ex        NuStep/TM  Src 10' Src 10'     clamshells Supine gtb x40 Supine btb x30 Supine btb x30     Hip add 20x5" ball squeeze supine 20x5" ball squeeze supine 20x5" ball squeeze supine     Hip ext nv Bent over 2x10 Bent over 2x10     pallof press   gtb x20 ea     Ham curls        Knee ext        Ankle strength - band        Standing HR  x30 x30     Lumbar ext        Lumbar flex        Supine hand to knee x12 3" ea x12 3" ea x12 3" ea     SKTC 6x10" 10x10" 10x10"     LTR        Piriformis stretch   30"x4 ea     education Provided and reviewed HEP       Ther Activity                        Gait Training                        Modalities                          Access Code: [email or web address]  URL: [email or web address]  Date: 2023  Prepared by: Alexander Blakely    Exercises  - Supine Alternating Knee Taps with Hands  - 1 x daily - 7 x weekly - 3 sets - 10 reps  - Supine Hip Adduction Isometric with Ball  - 1 x daily - 7 x weekly - 3 sets - 10 reps  - Hooklying Clamshell with Resistance  - 1 x daily - 7 x weekly - 3 sets - 10 reps  - Supine Posterior Pelvic Tilt  - 1 x daily - 7 x weekly - 3 sets - 10 reps

## 2023-09-07 ENCOUNTER — OFFICE VISIT (OUTPATIENT)
Dept: PHYSICAL THERAPY | Facility: CLINIC | Age: 83
End: 2023-09-07
Payer: COMMERCIAL

## 2023-09-07 DIAGNOSIS — M54.42 CHRONIC BILATERAL LOW BACK PAIN WITH BILATERAL SCIATICA: Primary | ICD-10-CM

## 2023-09-07 DIAGNOSIS — M54.41 CHRONIC BILATERAL LOW BACK PAIN WITH BILATERAL SCIATICA: Primary | ICD-10-CM

## 2023-09-07 DIAGNOSIS — G89.29 CHRONIC BILATERAL LOW BACK PAIN WITH BILATERAL SCIATICA: Primary | ICD-10-CM

## 2023-09-07 PROCEDURE — 97110 THERAPEUTIC EXERCISES: CPT

## 2023-09-07 PROCEDURE — 97112 NEUROMUSCULAR REEDUCATION: CPT

## 2023-09-07 NOTE — PROGRESS NOTES
Daily Note     Today's date: 2023  Patient name: Raffaele Owens  : 1940  MRN: [id number]  Referring provider: Sarita Garcia DO  Dx:   Encounter Diagnosis     ICD-10-CM    1. Chronic bilateral low back pain with bilateral sciatica  M54.42     M54.41     G89.29                      Subjective: Patient reports exercises are getting easier to do. Objective: See treatment diary below      Assessment: Raffaele Owens appeared to have improved tolerance to exercises. Patient does well with core stability exercises but does require some verbal and tactile cueing to perform correctly. Continued treatment should benefit. Plan: Continue per plan of care.       Precautions: hx of low back pain, kidney disease    Daily Treatment Diary:      Initial Evaluation Date: 23  POC Expiration: 23  Compliance 23               Visit Number 1 2  3  4               Re-Eval  IE                 MC   Foto Captured Y                      Manuals     Manual traction        Joint work        flexibility        ST        Neuro Re-Ed        PPT x15 with education 5"x20 5"x20 5"x20    PPT+march x10 x20 x20 RTB  2x10    birddog        bridge x8 (but d/c)       Swiss ball iso    5"x20                    Ther Ex        NuStep/TM  Src 10' Src 10' Src 10'    clamshells Supine gtb x40 Supine btb x30 Supine btb x30 Supine btb x30    Hip add 20x5" ball squeeze supine 20x5" ball squeeze supine 20x5" ball squeeze supine 20x5" ball squeeze supine    Hip ext nv Bent over 2x10 Bent over 2x10 Bent over 2x10    pallof press   gtb x20 ea gtb x20 ea    Ham curls        Knee ext        Ankle strength - band    gtb x20 ea    Standing HR  x30 x30     Lumbar ext        Lumbar flex        Supine hand to knee x12 3" ea x12 3" ea x12 3" ea x15 3" ea    SKTC 6x10" 10x10" 10x10" 10x10"    LTR        Piriformis stretch   30"x4 ea 30"x4 ea    education Provided and reviewed HEP       Ther Activity Gait Training                        Modalities                          Access Code: [email or web address]  URL: [email or web address]  Date: 08/29/2023  Prepared by: Jenise Traore    Exercises  - Supine Alternating Knee Taps with Hands  - 1 x daily - 7 x weekly - 3 sets - 10 reps  - Supine Hip Adduction Isometric with Ball  - 1 x daily - 7 x weekly - 3 sets - 10 reps  - Hooklying Clamshell with Resistance  - 1 x daily - 7 x weekly - 3 sets - 10 reps  - Supine Posterior Pelvic Tilt  - 1 x daily - 7 x weekly - 3 sets - 10 reps

## 2023-09-12 ENCOUNTER — OFFICE VISIT (OUTPATIENT)
Dept: PHYSICAL THERAPY | Facility: CLINIC | Age: 83
End: 2023-09-12
Payer: COMMERCIAL

## 2023-09-12 DIAGNOSIS — M54.41 CHRONIC BILATERAL LOW BACK PAIN WITH BILATERAL SCIATICA: Primary | ICD-10-CM

## 2023-09-12 DIAGNOSIS — G89.29 CHRONIC BILATERAL LOW BACK PAIN WITH BILATERAL SCIATICA: Primary | ICD-10-CM

## 2023-09-12 DIAGNOSIS — M54.42 CHRONIC BILATERAL LOW BACK PAIN WITH BILATERAL SCIATICA: Primary | ICD-10-CM

## 2023-09-12 PROCEDURE — 97112 NEUROMUSCULAR REEDUCATION: CPT

## 2023-09-12 PROCEDURE — 97110 THERAPEUTIC EXERCISES: CPT

## 2023-09-12 NOTE — PROGRESS NOTES
Daily Note     Today's date: 2023  Patient name: Lauro Garcia  : 1940  MRN: [id number]  Referring provider: Xi Thomason DO  Dx:   Encounter Diagnosis     ICD-10-CM    1. Chronic bilateral low back pain with bilateral sciatica  M54.42     M54.41     G89.29                      Subjective: Patient reports he is noting he can stand for a bit longer. Objective: See treatment diary below      Assessment: Lauro Garcia tolerated exercises well. No increased discomfort noted or expressed with progression of program. Patient appears to be compliant with HEP. Continued treatment should benefit. Plan: Continue per plan of care.       Precautions: hx of low back pain, kidney disease    Daily Treatment Diary:      Initial Evaluation Date: 23  POC Expiration: 23  Compliance 23             Visit Number 1 2  3  4  5             Re-Eval  IE                 MC   Foto Captured Y                      Manuals    Manual traction        Joint work        flexibility        ST        Neuro Re-Ed        PPT x15 with education 5"x20 5"x20 5"x20 5"x20   PPT+march x10 x20 x20 RTB  2x10 RTB  3x10   birddog        bridge x8 (but d/c)       Swiss ball iso    5"x20 5"x20                   Ther Ex        NuStep/TM  Src 10' Src 10' Src 10' Src 10'   clamshells Supine gtb x40 Supine btb x30 Supine btb x30 Supine btb x30 Supine btb x30   Hip add 20x5" ball squeeze supine 20x5" ball squeeze supine 20x5" ball squeeze supine 20x5" ball squeeze supine 30x5" ball squeeze supine   Hip ext nv Bent over 2x10 Bent over 2x10 Bent over 2x10 Bent over 2x10   Standing shoulder extension     gtb x20   pallof press   gtb x20 ea gtb x20 ea gtb x20ea   Ham curls        Knee ext        Ankle strength - band    gtb x20 ea Dorsi gtb x20 ea   Standing HR  x30 x30     Lumbar ext        Lumbar flex        Supine hand to knee x12 3" ea x12 3" ea x12 3" ea x15 3" ea x15 3" ea   SKTC 6x10" 10x10" 10x10" 10x10" 10x10"   LTR        Piriformis stretch   30"x4 ea 30"x4 ea 30"x4 ea   education Provided and reviewed HEP       Ther Activity                        Gait Training                        Modalities                          Access Code: [email or web address]  URL: [email or web address]  Date: 08/29/2023  Prepared by: Kristine Moreno    Exercises  - Supine Alternating Knee Taps with Hands  - 1 x daily - 7 x weekly - 3 sets - 10 reps  - Supine Hip Adduction Isometric with Ball  - 1 x daily - 7 x weekly - 3 sets - 10 reps  - Hooklying Clamshell with Resistance  - 1 x daily - 7 x weekly - 3 sets - 10 reps  - Supine Posterior Pelvic Tilt  - 1 x daily - 7 x weekly - 3 sets - 10 reps

## 2023-09-14 ENCOUNTER — OFFICE VISIT (OUTPATIENT)
Dept: PHYSICAL THERAPY | Facility: CLINIC | Age: 83
End: 2023-09-14
Payer: COMMERCIAL

## 2023-09-14 DIAGNOSIS — G89.29 CHRONIC BILATERAL LOW BACK PAIN WITH BILATERAL SCIATICA: Primary | ICD-10-CM

## 2023-09-14 DIAGNOSIS — M54.41 CHRONIC BILATERAL LOW BACK PAIN WITH BILATERAL SCIATICA: Primary | ICD-10-CM

## 2023-09-14 DIAGNOSIS — M54.42 CHRONIC BILATERAL LOW BACK PAIN WITH BILATERAL SCIATICA: Primary | ICD-10-CM

## 2023-09-14 PROCEDURE — 97110 THERAPEUTIC EXERCISES: CPT

## 2023-09-14 PROCEDURE — 97112 NEUROMUSCULAR REEDUCATION: CPT

## 2023-09-14 NOTE — PROGRESS NOTES
Daily Note     Today's date: 2023  Patient name: Spencer Smith  : 1940  MRN: [id number]  Referring provider: Tracy Koenig DO  Dx:   Encounter Diagnosis     ICD-10-CM    1. Chronic bilateral low back pain with bilateral sciatica  M54.42     M54.41     G89.29                      Subjective: Patient reports he has noted being able to walk further with a lot less back discomfort. Objective: See treatment diary below      Assessment: Spencer Smith tolerated treatment well. Appears to be improving in core stability and functional mobility. Rests between standing exercises required. Continued treatment indicated. Plan: Continue per plan of care.       Precautions: hx of low back pain, kidney disease    Daily Treatment Diary:      Initial Evaluation Date: 23  POC Expiration: 23  Compliance 23           Visit Number 1 2  3  4  5  6           Re-Eval  IE                 MC   Foto Captured Y                      Manuals    Manual traction        Joint work        flexibility        ST        Neuro Re-Ed        PPT 5"x20 5"x20 5"x20 5"x20 5"x20   PPT+march RTB  3x10 x20 x20 RTB  2x10 RTB  3x10   birddog        bridge        Swiss ball iso 5"x20   5"x20 5"x20                   Ther Ex        NuStep/TM Src 10' Src 10' Src 10' Src 10' Src 10'   clamshells Supine btb x30 Supine btb x30 Supine btb x30 Supine btb x30 Supine btb x30   Hip add 20x5" ball squeeze supine 20x5" ball squeeze supine 20x5" ball squeeze supine 20x5" ball squeeze supine 30x5" ball squeeze supine   Hip ext Bent over 2x10 Bent over 2x10 Bent over 2x10 Bent over 2x10 Bent over 2x10   Standing shoulder extension gtb x30    gtb x20   pallof press gtb x20 ea  gtb x20 ea gtb x20 ea gtb x20ea   Ham curls 40# 3x10    40# 2x10   Knee ext        Ankle strength - band Dorsi gtb x20 ea   gtb x20 ea Dorsi gtb x20 ea   Standing HR  x30 x30     Lumbar ext        Lumbar flex Supine hand to knee x15 5" ea x12 3" ea x12 3" ea x15 3" ea x15 3" ea   SKTC 10x10" 10x10" 10x10" 10x10" 10x10"   LTR        Piriformis stretch 30"x4 ea  30"x4 ea 30"x4 ea 30"x4 ea   education        Ther Activity                        Gait Training                        Modalities                          Access Code: [email or web address]  URL: [email or web address]  Date: 08/29/2023  Prepared by: Garth Freeze    Exercises  - Supine Alternating Knee Taps with Hands  - 1 x daily - 7 x weekly - 3 sets - 10 reps  - Supine Hip Adduction Isometric with Ball  - 1 x daily - 7 x weekly - 3 sets - 10 reps  - Hooklying Clamshell with Resistance  - 1 x daily - 7 x weekly - 3 sets - 10 reps  - Supine Posterior Pelvic Tilt  - 1 x daily - 7 x weekly - 3 sets - 10 reps

## 2023-09-19 ENCOUNTER — OFFICE VISIT (OUTPATIENT)
Dept: PHYSICAL THERAPY | Facility: CLINIC | Age: 83
End: 2023-09-19
Payer: COMMERCIAL

## 2023-09-19 DIAGNOSIS — M54.41 CHRONIC BILATERAL LOW BACK PAIN WITH BILATERAL SCIATICA: Primary | ICD-10-CM

## 2023-09-19 DIAGNOSIS — G89.29 CHRONIC BILATERAL LOW BACK PAIN WITH BILATERAL SCIATICA: Primary | ICD-10-CM

## 2023-09-19 DIAGNOSIS — M54.42 CHRONIC BILATERAL LOW BACK PAIN WITH BILATERAL SCIATICA: Primary | ICD-10-CM

## 2023-09-19 PROCEDURE — 97112 NEUROMUSCULAR REEDUCATION: CPT

## 2023-09-19 PROCEDURE — 97110 THERAPEUTIC EXERCISES: CPT

## 2023-09-19 NOTE — PROGRESS NOTES
Daily Note     Today's date: 2023  Patient name: Dennis Lindo  : 1940  MRN: [id number]  Referring provider: Patrick Rojas DO  Dx:   Encounter Diagnosis     ICD-10-CM    1. Chronic bilateral low back pain with bilateral sciatica  M54.42     M54.41     G89.29                      Subjective: Patient reports muscle soreness after last visit. Back symptoms are decreased. Objective: See treatment diary below      Assessment: Dennis Lindo continues with steady relief of symptoms and improving core stability. Tamica Flores is motivated byt positive results in PT. Conitnued PT should benefit. Plan: Continue per plan of care.       Precautions: hx of low back pain, kidney disease    Daily Treatment Diary:      Initial Evaluation Date: 23  POC Expiration: 23  Compliance 23         Visit Number 1 2  3  4  5  6  7         Re-Eval  IE                 [facility]   Foto Captured Y                      Manuals    Manual traction        Joint work        flexibility        ST        Neuro Re-Ed        PPT 5"x20 5"x20 5"x20 5"x20 5"x20   PPT+march RTB  3x10 GTB   3x10 x20 RTB  2x10 RTB  3x10   birddog        bridge        Swiss ball iso 5"x20 5"x20  5"x20 5"x20                   Ther Ex        NuStep/TM Src 10' Src 10' Src 10' Src 10' Src 10'   clamshells Supine btb x30 Supine btb x30 Supine btb x30 Supine btb x30 Supine btb x30   Hip add 20x5" ball squeeze supine 30x5" ball squeeze supine 20x5" ball squeeze supine 20x5" ball squeeze supine 30x5" ball squeeze supine   Hip ext Bent over 2x10 Bent over 2x10 Bent over 2x10 Bent over 2x10 Bent over 2x10   Standing shoulder extension gtb x30 gtb x30   gtb x20   pallof press gtb x20 ea gtb x20 ea gtb x20 ea gtb x20 ea gtb x20ea   Ham curls 40# 3x10    40# 2x10   Knee ext        Ankle strength - band Dorsi gtb x20 ea Dorsi gtb x30 ea  gtb x20 ea Dorsi gtb x20 ea   Standing HR   x30     Lumbar ext        Lumbar flex        Supine hand to knee x15 5" ea x15 5" ea x12 3" ea x15 3" ea x15 3" ea   SKTC 10x10" 10x10" 10x10" 10x10" 10x10"   LTR        Piriformis stretch 30"x4 ea 30"x4 ea 30"x4 ea 30"x4 ea 30"x4 ea   education        Ther Activity                        Gait Training                        Modalities                          Access Code: [email or web address]  URL: [email or web address]  Date: 08/29/2023  Prepared by: Kimani Garrido    Exercises  - Supine Alternating Knee Taps with Hands  - 1 x daily - 7 x weekly - 3 sets - 10 reps  - Supine Hip Adduction Isometric with Ball  - 1 x daily - 7 x weekly - 3 sets - 10 reps  - Hooklying Clamshell with Resistance  - 1 x daily - 7 x weekly - 3 sets - 10 reps  - Supine Posterior Pelvic Tilt  - 1 x daily - 7 x weekly - 3 sets - 10 reps

## 2023-09-21 ENCOUNTER — APPOINTMENT (OUTPATIENT)
Dept: PHYSICAL THERAPY | Facility: CLINIC | Age: 83
End: 2023-09-21
Payer: COMMERCIAL

## 2023-09-21 ENCOUNTER — OFFICE VISIT (OUTPATIENT)
Dept: PHYSICAL THERAPY | Facility: CLINIC | Age: 83
End: 2023-09-21
Payer: COMMERCIAL

## 2023-09-21 DIAGNOSIS — G89.29 CHRONIC BILATERAL LOW BACK PAIN WITH BILATERAL SCIATICA: Primary | ICD-10-CM

## 2023-09-21 DIAGNOSIS — M54.42 CHRONIC BILATERAL LOW BACK PAIN WITH BILATERAL SCIATICA: Primary | ICD-10-CM

## 2023-09-21 DIAGNOSIS — M54.41 CHRONIC BILATERAL LOW BACK PAIN WITH BILATERAL SCIATICA: Primary | ICD-10-CM

## 2023-09-21 PROCEDURE — 97110 THERAPEUTIC EXERCISES: CPT

## 2023-09-21 PROCEDURE — 97112 NEUROMUSCULAR REEDUCATION: CPT

## 2023-09-21 NOTE — PROGRESS NOTES
Daily Note     Today's date: 2023  Patient name: Zeeshan Vasquez  : 1940  MRN: [id number]  Referring provider: Alex Gonzalez DO  Dx:   Encounter Diagnosis     ICD-10-CM    1. Chronic bilateral low back pain with bilateral sciatica  M54.42     M54.41     G89.29                      Subjective: Patient reports not wearing brace except when doing work around the house. Objective: See treatment diary below    Assessment:  Zeeshan Vasquez continues with steady progress towards core stability goals. Patient mobility and function appears to be improved. he required moderate verbal cueing to complete exercises appropriate form and intensity with no tactile cues. Patient symptoms should decrease with continued treatments. Plan: Continue per plan of care.       Precautions: hx of low back pain, kidney disease    Daily Treatment Diary:      Initial Evaluation Date: 23  POC Expiration: 23  Compliance 23       Visit Number 1 2  3  4  5  6  7  8       Re-Eval  IE                 [facility]   Foto Captured Y                      Manuals    Manual traction        Joint work        flexibility        ST        Neuro Re-Ed        PPT 5"x20 5"x20 5"x20 5"x20 5"x20   PPT+march RTB  3x10 GTB   3x10 GTB   3x10 RTB  2x10 RTB  3x10   birddog        bridge        Swiss ball iso 5"x20 5"x20 5"x30 5"x20 5"x20                   Ther Ex        NuStep/TM Src 10' Src 10' Src 10' Src 10' Src 10'   clamshells Supine btb x30 Supine btb x30 Supine btb x30 Supine btb x30 Supine btb x30   Hip add 20x5" ball squeeze supine 30x5" ball squeeze supine 30x5" ball squeeze supine 20x5" ball squeeze supine 30x5" ball squeeze supine   Hip ext Bent over 2x10 Bent over 2x10 rtb 2x10 Bent over 2x10 Bent over 2x10   Standing shoulder extension gtb x30 gtb x30 btb x30  gtb x20   pallof press gtb x20 ea gtb x20 ea btb x20 ea gtb x20 ea gtb x20ea   Ham curls 40# 3x10 40# 3x10 40# 3x10  40# 2x10   Knee ext        Ankle strength - band Dorsi gtb x20 ea Dorsi gtb x30 ea Dorsi gtb x30 ea gtb x20 ea Dorsi gtb x20 ea   Standing HR        Lumbar ext        Lumbar flex        Supine hand to knee x15 5" ea x15 5" ea x15 5" ea x15 3" ea x15 3" ea   SKTC 10x10" 10x10" 10x10" 10x10" 10x10"   LTR        Piriformis stretch 30"x4 ea 30"x4 ea 30"x4 ea 30"x4 ea 30"x4 ea   education        Ther Activity                        Gait Training                        Modalities                          Access Code: [email or web address]  URL: [email or web address]  Date: 08/29/2023  Prepared by: Yamila Champagne    Exercises  - Supine Alternating Knee Taps with Hands  - 1 x daily - 7 x weekly - 3 sets - 10 reps  - Supine Hip Adduction Isometric with Ball  - 1 x daily - 7 x weekly - 3 sets - 10 reps  - Hooklying Clamshell with Resistance  - 1 x daily - 7 x weekly - 3 sets - 10 reps  - Supine Posterior Pelvic Tilt  - 1 x daily - 7 x weekly - 3 sets - 10 reps

## 2023-09-25 ENCOUNTER — APPOINTMENT (OUTPATIENT)
Dept: PHYSICAL THERAPY | Facility: CLINIC | Age: 83
End: 2023-09-25
Payer: COMMERCIAL

## 2023-09-25 ENCOUNTER — OFFICE VISIT (OUTPATIENT)
Dept: PHYSICAL THERAPY | Facility: CLINIC | Age: 83
End: 2023-09-25
Payer: COMMERCIAL

## 2023-09-25 DIAGNOSIS — M54.41 CHRONIC BILATERAL LOW BACK PAIN WITH BILATERAL SCIATICA: Primary | ICD-10-CM

## 2023-09-25 DIAGNOSIS — M54.42 CHRONIC BILATERAL LOW BACK PAIN WITH BILATERAL SCIATICA: Primary | ICD-10-CM

## 2023-09-25 DIAGNOSIS — G89.29 CHRONIC BILATERAL LOW BACK PAIN WITH BILATERAL SCIATICA: Primary | ICD-10-CM

## 2023-09-25 PROCEDURE — 97110 THERAPEUTIC EXERCISES: CPT

## 2023-09-25 PROCEDURE — 97112 NEUROMUSCULAR REEDUCATION: CPT

## 2023-09-25 NOTE — PROGRESS NOTES
Daily Note     Today's date: 2023  Patient name: Giancarlo Guerrero  : 1940  MRN: [id number]  Referring provider: Ilda Leyva DO  Dx:   Encounter Diagnosis     ICD-10-CM    1. Chronic bilateral low back pain with bilateral sciatica  M54.42     M54.41     G89.29                      Subjective: Patient reports back is holding up pretty well. Objective: See treatment diary below      Assessment: Giancarlo Guerrero continues with core stability. Strength appears to be improving with decreased discomfort noted with exercises. Continued treatment should benefit. Plan: Continue per plan of care.       Precautions: hx of low back pain, kidney disease    Daily Treatment Diary:      Initial Evaluation Date: 23  POC Expiration: 23  Compliance 23     Visit Number 1 2  3  4  5  6  7  8  9     Re-Eval  IE                 MC   Foto Captured Y                      Manuals    Manual traction        Joint work        flexibility        ST        Neuro Re-Ed        PPT 5"x20 5"x20 5"x20 5"x20 5"x20   PPT+march RTB  3x10 GTB   3x10 GTB   3x10 BTB   3x10 RTB  3x10   birddog        bridge        Swiss ball iso 5"x20 5"x20 5"x30 5"x30 5"x20                   Ther Ex        NuStep/TM Src 10' Src 10' Src 10' Src 10' Src 10'   clamshells Supine btb x30 Supine btb x30 Supine btb x30 Supine btb x30 Supine btb x30   Hip add 20x5" ball squeeze supine 30x5" ball squeeze supine 30x5" ball squeeze supine 30x5" ball squeeze supine 30x5" ball squeeze supine   Hip ext Bent over 2x10 Bent over 2x10 rtb 2x10 rtb 2x10 Bent over 2x10   Standing shoulder extension gtb x30 gtb x30 btb x30 btb x30 gtb x20   pallof press gtb x20 ea gtb x20 ea btb x20 ea btb x20 ea gtb x20ea   Ham curls 40# 3x10 40# 3x10 40# 3x10 50# 3x10 40# 2x10   Knee ext        Ankle strength - band Dorsi gtb x20 ea Dorsi gtb x30 ea Dorsi gtb x30 ea Dorsi btb x30 ea Dorsi gtb x20 ea Standing HR        Lumbar ext        Lumbar flex        Supine hand to knee x15 5" ea x15 5" ea x15 5" ea x15 5" ea x15 3" ea   SKTC 10x10" 10x10" 10x10" 10x10" 10x10"   LTR        Piriformis stretch 30"x4 ea 30"x4 ea 30"x4 ea 30"x4 ea 30"x4 ea   education        Ther Activity                        Gait Training                        Modalities                          Access Code: [email or web address]  URL: [email or web address]  Date: 08/29/2023  Prepared by: Carmine Ambrosio    Exercises  - Supine Alternating Knee Taps with Hands  - 1 x daily - 7 x weekly - 3 sets - 10 reps  - Supine Hip Adduction Isometric with Ball  - 1 x daily - 7 x weekly - 3 sets - 10 reps  - Hooklying Clamshell with Resistance  - 1 x daily - 7 x weekly - 3 sets - 10 reps  - Supine Posterior Pelvic Tilt  - 1 x daily - 7 x weekly - 3 sets - 10 reps

## 2023-09-26 ENCOUNTER — APPOINTMENT (OUTPATIENT)
Dept: PHYSICAL THERAPY | Facility: CLINIC | Age: 83
End: 2023-09-26
Payer: COMMERCIAL

## 2023-09-28 ENCOUNTER — OFFICE VISIT (OUTPATIENT)
Dept: PHYSICAL THERAPY | Facility: CLINIC | Age: 83
End: 2023-09-28
Payer: COMMERCIAL

## 2023-09-28 ENCOUNTER — APPOINTMENT (OUTPATIENT)
Dept: PHYSICAL THERAPY | Facility: CLINIC | Age: 83
End: 2023-09-28
Payer: COMMERCIAL

## 2023-09-28 DIAGNOSIS — M54.41 CHRONIC BILATERAL LOW BACK PAIN WITH BILATERAL SCIATICA: Primary | ICD-10-CM

## 2023-09-28 DIAGNOSIS — M54.42 CHRONIC BILATERAL LOW BACK PAIN WITH BILATERAL SCIATICA: Primary | ICD-10-CM

## 2023-09-28 DIAGNOSIS — G89.29 CHRONIC BILATERAL LOW BACK PAIN WITH BILATERAL SCIATICA: Primary | ICD-10-CM

## 2023-09-28 PROCEDURE — 97112 NEUROMUSCULAR REEDUCATION: CPT | Performed by: PHYSICAL THERAPIST

## 2023-09-28 PROCEDURE — 97110 THERAPEUTIC EXERCISES: CPT | Performed by: PHYSICAL THERAPIST

## 2023-09-28 NOTE — PROGRESS NOTES
PT Re-Evaluation     Today's date: 2023  Patient name: Nikki Sotelo  : 1940  MRN: [id number]  Referring provider: DO Devi Woods:   Encounter Diagnosis     ICD-10-CM    1. Chronic bilateral low back pain with bilateral sciatica  M54.42     M54.41     G89.29                      Assessment  Assessment details: Pt has shown good compliance with therapy and home program effort. He has been able to progress to standing work and has shown improvement with leg strength. He will continue to benefit from another 2-3 weeks of therapy to progress into more standing work with goal of increasing standing tolerance for activities such as standing at gas station.       Symptom irritability: moderateUnderstanding of Dx/Px/POC: good   Prognosis: fair    Goals  ST-3 weeks  Independent with phase I lumbar stability program - met  Independent with hip strengthening program for HEP - met  Improving standing tolerance to 10 minutes prior to needing to sit - not met      LT-6 weeks - not met   independent with trunk strength HEP  Review proper lifting and lumbar mechanics for painfree lifting with ADLs and household activity  Improving standing tolerance to 15 minutes prior to needing to sit to allow improved community activity such as grocery shopping  Complete SLS for 10 sec b/l with proper hip and trunk control      Plan  Plan details: TE, NMR, TA, MT, self education, and modalities as needed in order to progress through skilled PT focused on  ROM, strength, balance, coordination   Patient would benefit from: skilled physical therapy  Planned modality interventions: cryotherapy and thermotherapy: hydrocollator packs  Planned therapy interventions: manual therapy, neuromuscular re-education, self care, therapeutic activities, therapeutic exercise and home exercise program  Frequency: 2x week  Duration in weeks: 7  Plan of Care beginning date: 2023  Plan of Care expiration date: 10/17/2023  Treatment plan discussed with: patient        Subjective Evaluation    History of Present Illness  Mechanism of injury: 78year old male presenting to PT with chronic low back pain of multiple years. Has done injections, chiropractor, PT, and hx of back surgery approx 8 years ago (described as laminectomy - denies any metal in back). Feels the therapy is working. Still very limited with standing tolerance (3-5 minutes). He feels he would benefit from continued therapy for another 1-2 weeks to continue with progression. He is encouraged that is has started helping.   Patient Goals  Patient goals for therapy: decreased pain, improved balance, increased motion, increased strength and independence with ADLs/IADLs    Pain  At best pain ratin  At worst pain ratin          Objective   Observation/Gait  Slightly forward trunk position      Multi-seg movement patterns  Flex: to toes  Ext: neutral  SB: mid thigh b/l      Lumbar  Hypomobile pa    Tender to PSIS      Hip screen  Limited IR and ER b/l but symmetrical  Flex: WFL      Strength/myotome  Hip flex: 5/5 (B)  Hip abd: 4+/5 (B)  Knee ext/flex: 5/5 (B)  Ankle EV: 5/5, all others 5/5    Transfers: completes independently with extra time required    Sit-stand: completed without HA x8 reps without issue    Standing tolerance: 3-4 minutes in upright posture             Precautions: hx of low back pain, kidney disease    Daily Treatment Diary:      Initial Evaluation Date: 23  POC Expiration: 23  Compliance 23   Visit Number 1 2  3  4  5  6  7  8  9  10   Re-Eval  IE                 [facility]   Foto Captured Y                        Manuals    Manual traction            Joint work            flexibility            ST                 assessment   Neuro Re-Ed            PPT 5"x20 5"x20 5"x20 5"x20 5"x20   PPT+march RTB  3x10 GTB   3x10 GTB   3x10 BTB   3x10 btb 3x10   birddog         bridge            Swiss ball iso 5"x20 5"x20 5"x30 5"x30 5"x30                             Ther Ex            NuStep/TM Src 10' Src 10' Src 10' Src 10' Src 10'   clamshells Supine btb x30 Supine btb x30 Supine btb x30 Supine btb x30 Supine btb x30   Hip add 20x5" ball squeeze supine 30x5" ball squeeze supine 30x5" ball squeeze supine 30x5" ball squeeze supine 30x5" ball squeeze supine   Hip ext Bent over 2x10 Bent over 2x10 rtb 2x10 rtb 2x10 rtb 2x10   Standing shoulder extension gtb x30 gtb x30 btb x30 btb x30 btb x30   pallof press gtb x20 ea gtb x20 ea btb x20 ea btb x20 ea btb x20 ea   Ham curls 40# 3x10 40# 3x10 40# 3x10 50# 3x10 50# 3x8   Knee ext            Ankle strength - band Dorsi gtb x20 ea Dorsi gtb x30 ea Dorsi gtb x30 ea Dorsi btb x30 ea Dorsi btb x30 ea   Sit-stand         2x8   Lumbar ext            Lumbar flex            Supine hand to knee x15 5" ea x15 5" ea x15 5" ea x15 5" ea    SKTC 10x10" 10x10" 10x10" 10x10"    LTR            Piriformis stretch 30"x4 ea 30"x4 ea 30"x4 ea 30"x4 ea    education            Ther Activity                                      Gait Training                                        Modalities                                            Access Code: [email or web address]  URL: [email or web address]  Date: 08/29/2023  Prepared by: Jenny Huynh     Exercises  - Supine Alternating Knee Taps with Hands  - 1 x daily - 7 x weekly - 3 sets - 10 reps  - Supine Hip Adduction Isometric with Ball  - 1 x daily - 7 x weekly - 3 sets - 10 reps  - Hooklying Clamshell with Resistance  - 1 x daily - 7 x weekly - 3 sets - 10 reps  - Supine Posterior Pelvic Tilt  - 1 x daily - 7 x weekly - 3 sets - 10 reps

## 2023-09-28 NOTE — LETTER
2023    Yolanda Cheung DO  [address]    Patient: Janae Bustamante   YOB: 1940   Date of Visit: 2023     Encounter Diagnosis     ICD-10-CM    1. Chronic bilateral low back pain with bilateral sciatica  M54.42     M54.41     G89.29           Dear Dr. Diannia Rubinstein: Thank you for your recent referral of Janae Bustamante. Please review the attached evaluation summary from Rohit's recent visit. Please verify that you agree with the plan of care by signing the attached order. If you have any questions or concerns, please do not hesitate to call. I sincerely appreciate the opportunity to share in the care of one of your patients and hope to have another opportunity to work with you in the near future. Sincerely,    Katharina Carver, PT      Referring Provider:      I certify that I have read the below Plan of Care and certify the need for these services furnished under this plan of treatment while under my care. Yolanda Cheung DO  [address] [address]  Via Fax: [phone]          PT Re-Evaluation     Today's date: 2023  Patient name: Janae Bustamante  : 1940  MRN: [id number]  Referring provider: Yolanda Cheung DO  Dx:   Encounter Diagnosis     ICD-10-CM    1. Chronic bilateral low back pain with bilateral sciatica  M54.42     M54.41     G89.29                      Assessment  Assessment details: Pt has shown good compliance with therapy and home program effort. He has been able to progress to standing work and has shown improvement with leg strength. He will continue to benefit from another 2-3 weeks of therapy to progress into more standing work with goal of increasing standing tolerance for activities such as standing at gas station.       Symptom irritability: moderateUnderstanding of Dx/Px/POC: good   Prognosis: fair    Goals  ST-3 weeks  Independent with phase I lumbar stability program - met  Independent with hip strengthening program for HEP - met  Improving standing tolerance to 10 minutes prior to needing to sit - not met      LT-6 weeks - not met   independent with trunk strength HEP  Review proper lifting and lumbar mechanics for painfree lifting with ADLs and household activity  Improving standing tolerance to 15 minutes prior to needing to sit to allow improved community activity such as grocery shopping  Complete SLS for 10 sec b/l with proper hip and trunk control      Plan  Plan details: TE, NMR, TA, MT, self education, and modalities as needed in order to progress through skilled PT focused on  ROM, strength, balance, coordination   Patient would benefit from: skilled physical therapy  Planned modality interventions: cryotherapy and thermotherapy: hydrocollator packs  Planned therapy interventions: manual therapy, neuromuscular re-education, self care, therapeutic activities, therapeutic exercise and home exercise program  Frequency: 2x week  Duration in weeks: 7  Plan of Care beginning date: 2023  Plan of Care expiration date: 10/17/2023  Treatment plan discussed with: patient        Subjective Evaluation    History of Present Illness  Mechanism of injury: 78year old male presenting to PT with chronic low back pain of multiple years. Has done injections, chiropractor, PT, and hx of back surgery approx 8 years ago (described as laminectomy - denies any metal in back). Feels the therapy is working. Still very limited with standing tolerance (3-5 minutes). He feels he would benefit from continued therapy for another 1-2 weeks to continue with progression. He is encouraged that is has started helping.   Patient Goals  Patient goals for therapy: decreased pain, improved balance, increased motion, increased strength and independence with ADLs/IADLs    Pain  At best pain ratin  At worst pain ratin          Objective   Observation/Gait  Slightly forward trunk position      Multi-seg movement patterns  Flex: to toes  Ext: neutral  SB: mid thigh b/l      Lumbar  Hypomobile pa    Tender to PSIS      Hip screen  Limited IR and ER b/l but symmetrical  Flex: WFL      Strength/myotome  Hip flex: 5/5 (B)  Hip abd: 4+/5 (B)  Knee ext/flex: 5/5 (B)  Ankle EV: 5/5, all others 5/5    Transfers: completes independently with extra time required    Sit-stand: completed without HA x8 reps without issue    Standing tolerance: 3-4 minutes in upright posture            Precautions: hx of low back pain, kidney disease    Daily Treatment Diary:      Initial Evaluation Date: 08/29/23  POC Expiration: 9/26/23  Compliance 08/29/23 8/31 9/5 9/7 9/12 9/14 9/19 9/21 9/25 9/28   Visit Number 1 2  3  4  5  6  7  8  9  10   Re-Eval  IE                 [facility]   Foto Captured Y                        Manuals 9/14 9/19 9/21 9/25 9/28   Manual traction            Joint work            flexibility            ST                 assessment   Neuro Re-Ed            PPT 5"x20 5"x20 5"x20 5"x20 5"x20   PPT+march RTB  3x10 GTB   3x10 GTB   3x10 BTB   3x10 btb 3x10   birddog            bridge            Swiss ball iso 5"x20 5"x20 5"x30 5"x30 5"x30                             Ther Ex            NuStep/TM Src 10' Src 10' Src 10' Src 10' Src 10'   clamshells Supine btb x30 Supine btb x30 Supine btb x30 Supine btb x30 Supine btb x30   Hip add 20x5" ball squeeze supine 30x5" ball squeeze supine 30x5" ball squeeze supine 30x5" ball squeeze supine 30x5" ball squeeze supine   Hip ext Bent over 2x10 Bent over 2x10 rtb 2x10 rtb 2x10 rtb 2x10   Standing shoulder extension gtb x30 gtb x30 btb x30 btb x30 btb x30   pallof press gtb x20 ea gtb x20 ea btb x20 ea btb x20 ea btb x20 ea   Ham curls 40# 3x10 40# 3x10 40# 3x10 50# 3x10 50# 3x8   Knee ext            Ankle strength - band Dorsi gtb x20 ea Dorsi gtb x30 ea Dorsi gtb x30 ea Dorsi btb x30 ea Dorsi btb x30 ea   Sit-stand         2x8   Lumbar ext            Lumbar flex            Supine hand to knee x15 5" ea x15 5" ea x15 5" ea x15 5" ea    SKTC 10x10" 10x10" 10x10" 10x10"    LTR            Piriformis stretch 30"x4 ea 30"x4 ea 30"x4 ea 30"x4 ea    education            Ther Activity                                      Gait Training                                        Modalities                                            Access Code: [email or web address]  URL: [email or web address]  Date: 08/29/2023  Prepared by: Briana Dias     Exercises  - Supine Alternating Knee Taps with Hands  - 1 x daily - 7 x weekly - 3 sets - 10 reps  - Supine Hip Adduction Isometric with Ball  - 1 x daily - 7 x weekly - 3 sets - 10 reps  - Hooklying Clamshell with Resistance  - 1 x daily - 7 x weekly - 3 sets - 10 reps  - Supine Posterior Pelvic Tilt  - 1 x daily - 7 x weekly - 3 sets - 10 reps

## 2023-10-03 ENCOUNTER — OFFICE VISIT (OUTPATIENT)
Dept: PHYSICAL THERAPY | Facility: CLINIC | Age: 83
End: 2023-10-03
Payer: COMMERCIAL

## 2023-10-03 DIAGNOSIS — M54.42 CHRONIC BILATERAL LOW BACK PAIN WITH BILATERAL SCIATICA: Primary | ICD-10-CM

## 2023-10-03 DIAGNOSIS — G89.29 CHRONIC BILATERAL LOW BACK PAIN WITH BILATERAL SCIATICA: Primary | ICD-10-CM

## 2023-10-03 DIAGNOSIS — M54.41 CHRONIC BILATERAL LOW BACK PAIN WITH BILATERAL SCIATICA: Primary | ICD-10-CM

## 2023-10-03 PROCEDURE — 97110 THERAPEUTIC EXERCISES: CPT

## 2023-10-03 PROCEDURE — 97112 NEUROMUSCULAR REEDUCATION: CPT

## 2023-10-03 NOTE — PROGRESS NOTES
Daily Note     Today's date: 10/3/2023  Patient name: Angie Hendricks  : 1940  MRN: [id number]  Referring provider: Crystal Arreola DO  Dx:   Encounter Diagnosis     ICD-10-CM    1. Chronic bilateral low back pain with bilateral sciatica  M54.42     M54.41     G89.29                      Subjective: Patient reports he is able to stand longer and move around better. Objective: See treatment diary below      Assessment: Angie Hendricks has made good progress in PT. He has improved functional mobility. Anticipate D/C to HEP next visit. Plan: Potential discharge next visit.      Precautions: hx of low back pain, kidney disease    Daily Treatment Diary:      Initial Evaluation Date: 23  POC Expiration: 23  Compliance 10/3  8/31  9/5  9/7  9/12  9/14  9/19  9/21  9/25  9/28   Visit Number 11 2  3  4  5  6  7  8  9  10   [facility]   Foto Captured                         Manuals 10/3 9/19 9/21 9/25 9/28   Manual traction           Joint work           flexibility           ST                assessment   Neuro Re-Ed           PPT 5"x20 5"x20 5"x20 5"x20 5"x20   PPT+march btb 3x10 GTB   3x10 GTB   3x10 BTB   3x10 btb 3x10   birddog           bridge           Swiss ball iso 5"x30 5"x20 5"x30 5"x30 5"x30                           Ther Ex           NuStep/TM Src L2 10' Src 10' Src 10' Src 10' Src 10'   clamshells Supine btb x30 Supine btb x30 Supine btb x30 Supine btb x30 Supine btb x30   Hip add 30x5" ball squeeze supine 30x5" ball squeeze supine 30x5" ball squeeze supine 30x5" ball squeeze supine 30x5" ball squeeze supine   Hip ext gtb 2x10 Bent over 2x10 rtb 2x10 rtb 2x10 rtb 2x10   Standing shoulder extension btb x30 gtb x30 btb x30 btb x30 btb x30   pallof press btb x30 ea gtb x20 ea btb x20 ea btb x20 ea btb x20 ea   Ham curls 50# 3x10 40# 3x10 40# 3x10 50# 3x10 50# 3x8   Knee ext           Ankle strength - band Dorsi btb x30 ea Dorsi gtb x30 ea Dorsi gtb x30 ea Dorsi btb x30 ea Dorsi btb x30 ea   Sit-stand 3x8       2x8   Lumbar ext           Lumbar flex           Supine hand to knee x15 5" ea x15 5" ea x15 5" ea x15 5" ea    SKTC 10x10" 10x10" 10x10" 10x10"    LTR           Piriformis stretch  30"x4 ea 30"x4 ea 30"x4 ea    education           Ther Activity                                   Gait Training                                     Modalities                                         Access Code: [email or web address]  URL: [email or web address]  Date: 08/29/2023  Prepared by: Carmine Ambrosio     Exercises  - Supine Alternating Knee Taps with Hands  - 1 x daily - 7 x weekly - 3 sets - 10 reps  - Supine Hip Adduction Isometric with Ball  - 1 x daily - 7 x weekly - 3 sets - 10 reps  - Hooklying Clamshell with Resistance  - 1 x daily - 7 x weekly - 3 sets - 10 reps  - Supine Posterior Pelvic Tilt  - 1 x daily - 7 x weekly - 3 sets - 10 reps

## 2023-10-05 ENCOUNTER — OFFICE VISIT (OUTPATIENT)
Dept: PHYSICAL THERAPY | Facility: CLINIC | Age: 83
End: 2023-10-05
Payer: COMMERCIAL

## 2023-10-05 DIAGNOSIS — M54.41 CHRONIC BILATERAL LOW BACK PAIN WITH BILATERAL SCIATICA: Primary | ICD-10-CM

## 2023-10-05 DIAGNOSIS — M54.42 CHRONIC BILATERAL LOW BACK PAIN WITH BILATERAL SCIATICA: Primary | ICD-10-CM

## 2023-10-05 DIAGNOSIS — G89.29 CHRONIC BILATERAL LOW BACK PAIN WITH BILATERAL SCIATICA: Primary | ICD-10-CM

## 2023-10-05 PROCEDURE — 97112 NEUROMUSCULAR REEDUCATION: CPT

## 2023-10-05 PROCEDURE — 97110 THERAPEUTIC EXERCISES: CPT

## 2023-10-05 NOTE — PROGRESS NOTES
Daily Note/Discharge Summary     Today's date: 10/5/2023  Patient name: Angie Hendricks  : 1940  MRN: [id number]  Referring provider: Crystal Arreola DO  Dx:   Encounter Diagnosis     ICD-10-CM    1. Chronic bilateral low back pain with bilateral sciatica  M54.42     M54.41     G89.29                      Subjective: Patient reports he is feeling better overall. Improved standing tolerance and stomach stability. Objective: See treatment diary below      Assessment: Angie Hendricks has made good progress towards goals. He has a good understanding of exercises and progression. He should do well with D/C to HEP. Plan: D/C with HEP as per PT POC.      Precautions: hx of low back pain, kidney disease    Daily Treatment Diary:      Initial Evaluation Date: 23  POC Expiration: 23  Compliance 10/3 10/5  9/5  9/7  9/12  9/14  9/19  9/21  9/25  9/28   Visit Number 11 12  3  4  5  6  7  8  9  10   [facility]   Foto Captured                        Manuals 10/3 10/5 9/21 9/25 9/28   Manual traction          Joint work          flexibility          ST               assessment   Neuro Re-Ed          PPT 5"x20 5"x20 5"x20 5"x20 5"x20   PPT+march btb 3x10 btb 3x10 GTB   3x10 BTB   3x10 btb 3x10   birddog          bridge          Swiss ball iso 5"x30 5"x30 5"x30 5"x30 5"x30                         Ther Ex          NuStep/TM Src L2 10' Src L2 10' Src 10' Src 10' Src 10'   clamshells Supine btb x30 Supine btb x30 Supine btb x30 Supine btb x30 Supine btb x30   Hip add 30x5" ball squeeze supine 30x5" ball squeeze supine 30x5" ball squeeze supine 30x5" ball squeeze supine 30x5" ball squeeze supine   Hip ext gtb 2x10 gtb 2x10 rtb 2x10 rtb 2x10 rtb 2x10   Standing shoulder extension btb x30 btb x30 btb x30 btb x30 btb x30   pallof press btb x30 ea btb x30 ea btb x20 ea btb x20 ea btb x20 ea   Ham curls 50# 3x10 50# 3x10 40# 3x10 50# 3x10 50# 3x8   Knee ext          Ankle strength - band Dorsi btb x30 ea Dorsi btb x30 ea Dorsi gtb x30 ea Dorsi btb x30 ea Dorsi btb x30 ea   Sit-stand 3x8 3x8     2x8   Lumbar ext          Lumbar flex          Supine hand to knee x15 5" ea x15 5" ea x15 5" ea x15 5" ea x15 5" ea   SKTC 10x10" 10x10" 10x10" 10x10" 10x10"   LTR          Piriformis stretch   30"x4 ea 30"x4 ea 30"x4 ea   education          Ther Activity                                Gait Training                                  Modalities                                      Access Code: [email or web address]  URL: [email or web address]  Date: 08/29/2023  Prepared by: Janes Severs     Exercises  - Supine Alternating Knee Taps with Hands  - 1 x daily - 7 x weekly - 3 sets - 10 reps  - Supine Hip Adduction Isometric with Ball  - 1 x daily - 7 x weekly - 3 sets - 10 reps  - Hooklying Clamshell with Resistance  - 1 x daily - 7 x weekly - 3 sets - 10 reps  - Supine Posterior Pelvic Tilt  - 1 x daily - 7 x weekly - 3 sets - 10 reps

## 2024-01-05 ENCOUNTER — OFFICE VISIT (OUTPATIENT)
Dept: URGENT CARE | Facility: CLINIC | Age: 84
End: 2024-01-05
Payer: COMMERCIAL

## 2024-01-05 VITALS
HEART RATE: 89 BPM | RESPIRATION RATE: 22 BRPM | OXYGEN SATURATION: 95 % | BODY MASS INDEX: 27.86 KG/M2 | SYSTOLIC BLOOD PRESSURE: 139 MMHG | TEMPERATURE: 97.2 F | HEIGHT: 68 IN | DIASTOLIC BLOOD PRESSURE: 67 MMHG | WEIGHT: 183.8 LBS

## 2024-01-05 DIAGNOSIS — R25.1 SHAKING: ICD-10-CM

## 2024-01-05 DIAGNOSIS — R68.89 FLU-LIKE SYMPTOMS: Primary | ICD-10-CM

## 2024-01-05 LAB
GLUCOSE SERPL-MCNC: 112 MG/DL (ref 65–140)
SARS-COV-2 AG UPPER RESP QL IA: NEGATIVE
SL AMB POCT GLUCOSE BLD: 112
VALID CONTROL: NORMAL

## 2024-01-05 PROCEDURE — 82948 REAGENT STRIP/BLOOD GLUCOSE: CPT

## 2024-01-05 PROCEDURE — 99213 OFFICE O/P EST LOW 20 MIN: CPT

## 2024-01-05 PROCEDURE — 87636 SARSCOV2 & INF A&B AMP PRB: CPT

## 2024-01-05 PROCEDURE — 87811 SARS-COV-2 COVID19 W/OPTIC: CPT

## 2024-01-05 NOTE — PROGRESS NOTES
[facility]        NAME: Rohit Hwang is a 83 y.o. male  : 1940    MRN: [id number]  DATE: 2024  TIME: 4:13 PM    Assessment and Plan   Flu-like symptoms [R68.89]  1. Flu-like symptoms  Poct Covid 19 Rapid Antigen Test    Covid/Flu- Office Collect Normal      2. Shaking  POCT blood glucose        Discussed problem with patient and his family.  COVID performed today was negative.  Blood glucose was 112.  Suspicious of viral etiology.  Suspicious that shaking and weakness due to recent seizure Sudafed usage and advised not to use.  Patient and his family report much improvement after taking medication this morning.  Discussed red flag signs that she report to the ER if experiencing.    Patient Instructions       Follow up with PCP in 3-5 days.  Proceed to  ER if symptoms worsen.    Chief Complaint     Chief Complaint   Patient presents with   • Cold Like Symptoms     Fever, cough, weakness, and jittery starting last night; hx of colitis         History of Present Illness       Fever, cough, weakness, and jittery starting last night; hx of colitis. No flu shot this year. Reports jitter but took robitussin DM, has high blood pressure.  States his jittering and shaking is greatly improved throughout the day.  Reported a fever of 101 at noon today and did not take anything to reduce it.  Also has associated cough without shortness of breath, chest pain, abdominal complaints, body aches.      Review of Systems   Review of Systems   Constitutional:  Positive for fatigue and fever (101*f, noon today). Negative for appetite change and chills.   HENT:  Negative for congestion, ear pain, postnasal drip, rhinorrhea, sinus pressure, sinus pain and sore throat.    Respiratory:  Positive for cough. Negative for shortness of breath, wheezing and stridor.    Cardiovascular:  Negative for chest pain and palpitations.   Gastrointestinal:  Negative for abdominal pain, constipation, diarrhea, nausea and  "vomiting.   Musculoskeletal:  Negative for myalgias.   Neurological:  Negative for dizziness, syncope, light-headedness and headaches.         Current Medications       Current Outpatient Medications:   •  cholecalciferol (VITAMIN D3) 1,000 units tablet, Take 2,000 Units by mouth daily, Disp: , Rfl:   •  gabapentin (NEURONTIN) 300 mg capsule, 300 mg 3 (three) times a day 300mg in am 200mg 1500  100mg hs, Disp: , Rfl:   •  glucosamine-chondroitin 500-400 MG tablet, Take 1 tablet by mouth daily, Disp: , Rfl:   •  lisinopril (ZESTRIL) 10 mg tablet, Take 15 mg by mouth daily , Disp: , Rfl:   •  Multiple Vitamins-Minerals (CENTRUM SILVER 50+MEN PO), Take 1 tablet by mouth daily, Disp: , Rfl:   •  rosuvastatin (CRESTOR) 5 mg tablet, Take 5 mg by mouth daily , Disp: , Rfl:     Current Allergies     Allergies as of 01/05/2024 - Reviewed 01/05/2024   Allergen Reaction Noted   • Cephalexin Dizziness 06/23/2021            The following portions of the patient's history were reviewed and updated as appropriate: allergies, current medications, past family history, past medical history, past social history, past surgical history and problem list.     Past Medical History:   Diagnosis Date   • Hypertension    • Prostate cancer (HCC)        Past Surgical History:   Procedure Laterality Date   • APPENDECTOMY     • BACK SURGERY  2016   • CATARACT EXTRACTION  2020   • PROSTATE SURGERY         History reviewed. No pertinent family history.      Medications have been verified.        Objective   /67   Pulse 89   Temp (!) 97.2 °F (36.2 °C)   Resp 22   Ht 5' 8\" (1.727 m)   Wt 83.4 kg (183 lb 12.8 oz)   SpO2 95%   BMI 27.95 kg/m²        Physical Exam     Physical Exam  Vitals and nursing note reviewed.   Constitutional:       General: He is not in acute distress.     Appearance: Normal appearance. He is normal weight. He is not ill-appearing, toxic-appearing or diaphoretic.   HENT:      Head: Normocephalic.      Right Ear: " Tympanic membrane, ear canal and external ear normal. There is no impacted cerumen.      Left Ear: Tympanic membrane, ear canal and external ear normal. There is no impacted cerumen.      Nose: Nose normal. No congestion or rhinorrhea.      Mouth/Throat:      Mouth: Mucous membranes are moist.      Pharynx: Oropharynx is clear. No oropharyngeal exudate or posterior oropharyngeal erythema.   Eyes:      General:         Right eye: No discharge.         Left eye: No discharge.      Extraocular Movements: Extraocular movements intact.      Conjunctiva/sclera: Conjunctivae normal.      Pupils: Pupils are equal, round, and reactive to light.   Neck:      Vascular: No carotid bruit.   Cardiovascular:      Rate and Rhythm: Normal rate and regular rhythm.      Pulses: Normal pulses.      Heart sounds: Normal heart sounds. No murmur heard.     No friction rub. No gallop.   Pulmonary:      Effort: Pulmonary effort is normal. No respiratory distress.      Breath sounds: Normal breath sounds. No stridor. No wheezing, rhonchi or rales.   Chest:      Chest wall: No tenderness.   Musculoskeletal:      Cervical back: Normal range of motion and neck supple. No rigidity or tenderness.   Lymphadenopathy:      Cervical: No cervical adenopathy.   Neurological:      Mental Status: He is alert.

## 2024-01-06 LAB
FLUAV RNA RESP QL NAA+PROBE: NEGATIVE
FLUBV RNA RESP QL NAA+PROBE: NEGATIVE
SARS-COV-2 RNA RESP QL NAA+PROBE: NEGATIVE

## 2024-02-06 ENCOUNTER — OFFICE VISIT (OUTPATIENT)
Dept: URGENT CARE | Facility: CLINIC | Age: 84
End: 2024-02-06
Payer: COMMERCIAL

## 2024-02-06 VITALS
HEART RATE: 78 BPM | HEIGHT: 68 IN | OXYGEN SATURATION: 96 % | SYSTOLIC BLOOD PRESSURE: 149 MMHG | DIASTOLIC BLOOD PRESSURE: 72 MMHG | BODY MASS INDEX: 27.74 KG/M2 | TEMPERATURE: 97.4 F | WEIGHT: 183 LBS | RESPIRATION RATE: 18 BRPM

## 2024-02-06 DIAGNOSIS — L03.314 CELLULITIS OF GROIN: Primary | ICD-10-CM

## 2024-02-06 PROCEDURE — 99213 OFFICE O/P EST LOW 20 MIN: CPT | Performed by: PHYSICIAN ASSISTANT

## 2024-02-06 NOTE — PROGRESS NOTES
[facility]        NAME: Rohit Hwang is a 84 y.o. male  : 1940    MRN: [id number]  DATE: 2024  TIME: 12:29 PM    Assessment and Plan   Cellulitis of groin [L03.314]  1. Cellulitis of groin  mupirocin (BACTROBAN) 2 % ointment            Patient Instructions       Follow up with PCP as needed.  Continue to keep area clean and use looser fitting underwear.  Chief Complaint     Chief Complaint   Patient presents with    Wound Infection     Wound to stomach at waist band  since Saturday.          History of Present Illness       Patient with several day history of drainage over his right lower quadrant incision.  Slight erythema over the area.  Area was from friction from rubbing of his underwear.  No fever or chills.        Review of Systems   Review of Systems   All other systems reviewed and are negative.        Current Medications       Current Outpatient Medications:     cholecalciferol (VITAMIN D3) 1,000 units tablet, Take 2,000 Units by mouth daily, Disp: , Rfl:     gabapentin (NEURONTIN) 300 mg capsule, 300 mg 3 (three) times a day 300mg in am 200mg 1500  100mg hs, Disp: , Rfl:     glucosamine-chondroitin 500-400 MG tablet, Take 1 tablet by mouth daily, Disp: , Rfl:     lisinopril (ZESTRIL) 10 mg tablet, Take 15 mg by mouth daily , Disp: , Rfl:     Multiple Vitamins-Minerals (CENTRUM SILVER 50+MEN PO), Take 1 tablet by mouth daily, Disp: , Rfl:     mupirocin (BACTROBAN) 2 % ointment, Apply topically 3 (three) times a day, Disp: 30 g, Rfl: 0    rosuvastatin (CRESTOR) 5 mg tablet, Take 5 mg by mouth daily , Disp: , Rfl:     Current Allergies     Allergies as of 2024 - Reviewed 2024   Allergen Reaction Noted    Cephalexin Dizziness 2021            The following portions of the patient's history were reviewed and updated as appropriate: allergies, current medications, past family history, past medical history, past social history, past surgical history and problem  "list.     Past Medical History:   Diagnosis Date    Hypertension     Prostate cancer (HCC)        Past Surgical History:   Procedure Laterality Date    APPENDECTOMY      BACK SURGERY  2016    CATARACT EXTRACTION  2020    PROSTATE SURGERY         History reviewed. No pertinent family history.      Medications have been verified.        Objective   /72   Pulse 78   Temp (!) 97.4 °F (36.3 °C) (Temporal)   Resp 18   Ht 5' 8\" (1.727 m)   Wt 83 kg (183 lb)   SpO2 96%   BMI 27.83 kg/m²   No LMP for male patient.       Physical Exam     Physical Exam  Vitals and nursing note reviewed.   Constitutional:       Appearance: Normal appearance. He is normal weight.   Cardiovascular:      Rate and Rhythm: Normal rate and regular rhythm.      Pulses: Normal pulses.      Heart sounds: Normal heart sounds.   Pulmonary:      Effort: Pulmonary effort is normal.      Breath sounds: Normal breath sounds.   Neurological:      Mental Status: He is alert.       Slight erythema with scabbing over old appendix scar.  No lymphadenopathy            "

## 2025-03-31 ENCOUNTER — HOSPITAL ENCOUNTER (EMERGENCY)
Facility: HOSPITAL | Age: 85
Discharge: HOME | End: 2025-03-31
Attending: EMERGENCY MEDICINE | Admitting: EMERGENCY MEDICINE
Payer: COMMERCIAL

## 2025-03-31 VITALS
SYSTOLIC BLOOD PRESSURE: 149 MMHG | BODY MASS INDEX: 28.23 KG/M2 | RESPIRATION RATE: 18 BRPM | TEMPERATURE: 97.4 F | HEART RATE: 63 BPM | DIASTOLIC BLOOD PRESSURE: 64 MMHG | OXYGEN SATURATION: 95 % | WEIGHT: 186.29 LBS | HEIGHT: 68 IN

## 2025-03-31 DIAGNOSIS — R13.10 DYSPHAGIA: Primary | ICD-10-CM

## 2025-03-31 PROCEDURE — 99283 EMERGENCY DEPT VISIT LOW MDM: CPT

## 2025-03-31 PROCEDURE — 99284 EMERGENCY DEPT VISIT MOD MDM: CPT | Performed by: EMERGENCY MEDICINE

## 2025-03-31 NOTE — ED PROVIDER NOTES
Time reflects when diagnosis was documented in both MDM as applicable and the Disposition within this note       Time User Action Codes Description Comment    3/31/2025  1:39 PM Alfred Silver Add [R13.10] Dysphagia           ED Disposition       ED Disposition   Discharge    Condition   Stable    Date/Time   Mon Mar 31, 2025  1:39 PM    Comment   Rohit Hwang discharge to home/self care.                   Assessment & Plan       Medical Decision Making  This patient presents with food stuck in esophagus.   Diagnostic considerations include esophageal impaction, esophageal webs or narrowing, oropharyngeal foreign body. See ED Course.           ED Course as of 03/31/25 2057   Mon Mar 31, 2025   1343 Able to drink brown soda using straw without difficulty, we discussed gastroenterology follow-up, diet and agreeable with close outpatient follow-up and will return if worse or additional symptoms, has appointment with dentist in 2 days       Medications - No data to display    ED Risk Strat Scores                            SBIRT 20yo+      Flowsheet Row Most Recent Value   Initial Alcohol Screen: US AUDIT-C     1. How often do you have a drink containing alcohol? 0 Filed at: 03/31/2025 1332   2. How many drinks containing alcohol do you have on a typical day you are drinking?  0 Filed at: 03/31/2025 1332   3b. FEMALE Any Age, or MALE 65+: How often do you have 4 or more drinks on one occassion? 0 Filed at: 03/31/2025 1332   Audit-C Score 0 Filed at: 03/31/2025 1332   THIAGO: How many times in the past year have you...    Used an illegal drug or used a prescription medication for non-medical reasons? Never Filed at: 03/31/2025 1332                            History of Present Illness       Chief Complaint   Patient presents with    Swallowed Foreign Body     Pt was eating an apple at home, began to choke, cleared with water but reports when eating or drinking they still have sensation of something being stuck        Past Medical History:   Diagnosis Date    Hypertension     Prostate cancer (HCC)       Past Surgical History:   Procedure Laterality Date    APPENDECTOMY      BACK SURGERY  2016    CATARACT EXTRACTION  2020    PROSTATE SURGERY        History reviewed. No pertinent family history.   Social History     Tobacco Use    Smoking status: Former    Smokeless tobacco: Never    Tobacco comments:     Quit at age 22   Vaping Use    Vaping status: Never Used   Substance Use Topics    Alcohol use: Not Currently    Drug use: Not Currently      E-Cigarette/Vaping    E-Cigarette Use Never User       E-Cigarette/Vaping Substances      I have reviewed and agree with the history as documented.     85-year-old male via EMS eating an apple about an hour prior to arrival with difficulty secondary to recent left-sided broken tooth, states he swallowed a large piece of apple and had discomfort with swallowing and then unable to eat applesauce or drink prior, called EMS, but notes able to drink fluids just before EMS arrived.  No chest pain or dyspnea.  No other complaints.      History provided by:  Patient  Swallowed Foreign Body  Location:  Esophagus  Quality:  Discomfort  Severity:  Mild  Onset quality:  Sudden  Duration:  60 minutes  Timing:  Constant  Progression:  Improving  Chronicity:  New  Associated symptoms: no abdominal pain, no chest pain, no fever and no shortness of breath        Review of Systems   Constitutional:  Negative for fever.   Respiratory:  Negative for shortness of breath.    Cardiovascular:  Negative for chest pain.   Gastrointestinal:  Negative for abdominal pain.   All other systems reviewed and are negative.          Objective       ED Triage Vitals   Temperature Pulse Blood Pressure Respirations SpO2 Patient Position - Orthostatic VS   03/31/25 1331 03/31/25 1331 03/31/25 1332 03/31/25 1331 03/31/25 1331 --   (!) 97.4 °F (36.3 °C) 75 149/64 18 93 %       Temp src Heart Rate Source BP Location FiO2 (%)  Pain Score    -- 25 1331 -- -- --     Monitor         Vitals      Date and Time Temp Pulse SpO2 Resp BP Pain Score FACES Pain Rating User   25 1345 -- 63 95 % 18 149/64 -- -- SB   25 1332 -- -- -- -- 149/64 -- -- SS   25 1331 97.4 °F (36.3 °C) 75 93 % 18 -- -- -- SS            Physical Exam  Vitals and nursing note reviewed.   Constitutional:       General: He is not in acute distress.     Appearance: Normal appearance.      Comments: Pleasant, well-appearing, conversational, articulate   HENT:      Head: Normocephalic and atraumatic.      Right Ear: External ear normal.      Left Ear: External ear normal.      Nose: Nose normal.      Mouth/Throat:      Mouth: Mucous membranes are moist.      Pharynx: Oropharynx is clear.      Comments: No foreign body or oropharyngeal injury  Eyes:      Conjunctiva/sclera: Conjunctivae normal.   Cardiovascular:      Comments: Good color  Pulmonary:      Effort: Pulmonary effort is normal. No respiratory distress.   Abdominal:      General: Abdomen is flat.   Musculoskeletal:         General: No deformity.      Cervical back: Neck supple.   Skin:     General: Skin is warm and dry.   Neurological:      General: No focal deficit present.      Mental Status: He is alert and oriented to person, place, and time.      Motor: No weakness.      Coordination: Coordination normal.   Psychiatric:         Mood and Affect: Mood normal.         Behavior: Behavior normal.         Results Reviewed       None            No orders to display       Procedures    ED Medication and Procedure Management   Prior to Admission Medications   Prescriptions Last Dose Informant Patient Reported? Taking?   Multiple Vitamins-Minerals (CENTRUM SILVER 50+MEN PO)   Yes No   Sig: Take 1 tablet by mouth daily   cholecalciferol (VITAMIN D3) 1,000 units tablet   Yes No   Sig: Take 2,000 Units by mouth daily   gabapentin (NEURONTIN) 300 mg capsule   Yes No   Si mg 3 (three) times a day 300mg  in am  200mg 1500   100mg hs   glucosamine-chondroitin 500-400 MG tablet   Yes No   Sig: Take 1 tablet by mouth daily   lisinopril (ZESTRIL) 10 mg tablet   Yes No   Sig: Take 15 mg by mouth daily    mupirocin (BACTROBAN) 2 % ointment   No No   Sig: Apply topically 3 (three) times a day   rosuvastatin (CRESTOR) 5 mg tablet   Yes No   Sig: Take 5 mg by mouth daily       Facility-Administered Medications: None     Discharge Medication List as of 3/31/2025  1:43 PM        CONTINUE these medications which have NOT CHANGED    Details   cholecalciferol (VITAMIN D3) 1,000 units tablet Take 2,000 Units by mouth daily, Historical Med      gabapentin (NEURONTIN) 300 mg capsule 300 mg 3 (three) times a day 300mg in am  200mg 1500   100mg hs, Starting Fri 1/24/2020, Historical Med      glucosamine-chondroitin 500-400 MG tablet Take 1 tablet by mouth daily, Historical Med      lisinopril (ZESTRIL) 10 mg tablet Take 15 mg by mouth daily , Starting Wed 1/22/2020, Historical Med      Multiple Vitamins-Minerals (CENTRUM SILVER 50+MEN PO) Take 1 tablet by mouth daily, Historical Med      mupirocin (BACTROBAN) 2 % ointment Apply topically 3 (three) times a day, Starting Tue 2/6/2024, Normal      rosuvastatin (CRESTOR) 5 mg tablet Take 5 mg by mouth daily , Starting Tue 1/21/2020, Historical Med             ED SEPSIS DOCUMENTATION   Time reflects when diagnosis was documented in both MDM as applicable and the Disposition within this note       Time User Action Codes Description Comment    3/31/2025  1:39 PM Alfred Silver Add [R13.10] Dysphagia                  Alfred Silver DO  03/31/25 2057

## 2025-03-31 NOTE — DISCHARGE INSTRUCTIONS
Maintain good fluid intake, soft foods  Keep appointment with dentist this week  Inform your primary clinician of visit  Please arrange to see gastroenterologist